# Patient Record
Sex: MALE | Race: WHITE | NOT HISPANIC OR LATINO | Employment: FULL TIME | URBAN - METROPOLITAN AREA
[De-identification: names, ages, dates, MRNs, and addresses within clinical notes are randomized per-mention and may not be internally consistent; named-entity substitution may affect disease eponyms.]

---

## 2018-12-26 ENCOUNTER — LAB REQUISITION (OUTPATIENT)
Dept: LAB | Facility: HOSPITAL | Age: 23
End: 2018-12-26

## 2018-12-26 DIAGNOSIS — Z00.00 ROUTINE GENERAL MEDICAL EXAMINATION AT A HEALTH CARE FACILITY: ICD-10-CM

## 2018-12-26 LAB — TROPONIN I SERPL-MCNC: <0.006 NG/ML

## 2018-12-26 PROCEDURE — 84484 ASSAY OF TROPONIN QUANT: CPT

## 2019-03-11 ENCOUNTER — OFFICE VISIT (OUTPATIENT)
Dept: INTERNAL MEDICINE | Facility: CLINIC | Age: 24
End: 2019-03-11

## 2019-03-11 ENCOUNTER — TELEPHONE (OUTPATIENT)
Dept: INTERNAL MEDICINE | Facility: CLINIC | Age: 24
End: 2019-03-11

## 2019-03-11 VITALS
HEIGHT: 74 IN | BODY MASS INDEX: 35.94 KG/M2 | WEIGHT: 280 LBS | RESPIRATION RATE: 17 BRPM | HEART RATE: 82 BPM | TEMPERATURE: 97.9 F | OXYGEN SATURATION: 96 % | SYSTOLIC BLOOD PRESSURE: 120 MMHG | DIASTOLIC BLOOD PRESSURE: 82 MMHG

## 2019-03-11 DIAGNOSIS — Z76.89 ENCOUNTER TO ESTABLISH CARE: Primary | ICD-10-CM

## 2019-03-11 DIAGNOSIS — F33.0 MILD EPISODE OF RECURRENT MAJOR DEPRESSIVE DISORDER (HCC): ICD-10-CM

## 2019-03-11 DIAGNOSIS — Z87.898 HISTORY OF CHEST PAIN: ICD-10-CM

## 2019-03-11 PROCEDURE — 99203 OFFICE O/P NEW LOW 30 MIN: CPT | Performed by: PHYSICIAN ASSISTANT

## 2019-03-11 RX ORDER — ESCITALOPRAM OXALATE 5 MG/1
5 TABLET ORAL DAILY
Qty: 30 TABLET | Refills: 2 | Status: SHIPPED | OUTPATIENT
Start: 2019-03-11 | End: 2019-04-16 | Stop reason: SDUPTHER

## 2019-03-11 NOTE — PROGRESS NOTES
"Chief Complaint   Patient presents with   • Establish Care     Depression, x3 months   • Chest Pain     x3 months ago F/U, neck pain, radiating neck pain from left side, down left arm, [facility] EKG, Labs,X-ray advised to take aleve for the pain       Subjective       History of Present Illness     Estuardo Crooks is a 23 y.o. male. He presents as a new patient to establish [facility]. Pt current concerns are depression/ anxiety, and f/u of chest pain from [facility] December 2018. Regarding depression, pt states he has had mild depression off and on x4 years, worsened in the last 3 months. He often feels down, doesn't want to leave the house in the AM, and feeling very fatigued. He is still going to work and not affecting work. He does report this is affecting some relationships, specifically with his fiancee. He does have some anxiety, spiraling thoughts. He denies panic attacks. Does have some difficulty sleeping, both falling asleep and staying asleep. He denies thoughts of self-harm or feeling hopeless, and denies thoughts of SI. He has never been on medication for depression/ anxiety in the past. He has never seen a counselor or therapist for this issue, only went to marital counseling about 1 year ago.     Pt also reports episode of chest pain in December 2018. Pt states began [place] with aching at L neck, radiated down to shoulder and arm and finally radiated to chest. He had some SOA with this episode, worse with lying down. He was seen at [facility] ED on 12/26/2018 with persistent symptoms. He had CXR, EKG, labs, and pt states he had \"inflammation around the heart\" likely due to virus. He was advised to take Aleve, no other tx. He states symptoms lasted about 2 weeks total, and he has not had any further chest pain, aching, or radiating pain. No further f/u on this issue since ED visit.       Are you currently seeing any other doctors or specialists? No     Are you currently taking any OTC medications or " herbal medications? Sadler dietary supplement    Sleep: 6-7 hours/ night, has trouble falling asleep and staying asleep  Diet: moderately healthy, per pt.   Exercise: no regular exercise routine, active with work    Most recent colonoscopy: n/a  Regular dental visits: Yes  Regular eye exams: Yes, wears glasses only     PHQ-9 Depression Screening  Little interest or pleasure in doing things? 1   Feeling down, depressed, or hopeless? 1   Trouble falling or staying asleep, or sleeping too much? 2   Feeling tired or having little energy? 3   Poor appetite or overeating? 3   Feeling bad about yourself - or that you are a failure or have let yourself or your family down? 1   Trouble concentrating on things, such as reading the newspaper or watching television? 3   Moving or speaking so slowly that other people could have noticed? Or the opposite - being so fidgety or restless that you have been moving around a lot more than usual? 1   Thoughts that you would be better off dead, or of hurting yourself in some way? 0   PHQ-9 Total Score 15   If you checked off any problems, how difficult have these problems made it for you to do your work, take care of things at home, or get along with other people? Somewhat difficult         The following portions of the patient's history were reviewed and updated as appropriate: allergies, current medications, past family history, past medical history, past social history, past surgical history and problem list.    No Known Allergies  Social History     Tobacco Use   • Smoking status: Former Smoker   • Smokeless tobacco: Current User   Substance Use Topics   • Alcohol use: Yes     Comment: occ.     History reviewed. No pertinent surgical history.  Family History   Problem Relation Age of Onset   • Arthritis Father    • Colon polyps Father    • Heart attack Maternal Grandmother    • Diabetes Maternal Grandfather          Current Outpatient Medications:   •  escitalopram (LEXAPRO) 5 MG  tablet, Take 1 tablet by mouth Daily., Disp: 30 tablet, Rfl: 2    There is no problem list on file for this patient.      Review of Systems   Constitutional: Positive for fatigue. Negative for chills and fever.   HENT: Negative for congestion, ear pain, sore throat and trouble swallowing.    Eyes: Negative for blurred vision.   Respiratory: Negative for cough, shortness of breath and wheezing.    Cardiovascular: Negative for chest pain, palpitations and leg swelling.   Gastrointestinal: Negative for abdominal pain, diarrhea, nausea and vomiting.   Genitourinary: Negative for dysuria.   Allergic/Immunologic: Negative for immunocompromised state.   Neurological: Negative for dizziness, syncope, weakness and headache.   Psychiatric/Behavioral: Positive for sleep disturbance and depressed mood. Negative for self-injury and suicidal ideas. The patient is nervous/anxious.        Objective   Vitals:    03/11/19 0913   BP: 120/82   Pulse: 82   Resp: 17   Temp: 97.9 °F (36.6 °C)   SpO2: 96%     Physical Exam   Constitutional: He appears well-developed and well-nourished.   HENT:   Head: Normocephalic and atraumatic.   Right Ear: Tympanic membrane, external ear and ear canal normal.   Left Ear: Tympanic membrane, external ear and ear canal normal.   Mouth/Throat: Oropharynx is clear and moist and mucous membranes are normal.   Eyes: Conjunctivae are normal.   Neck: Normal range of motion. Neck supple. Carotid bruit is not present. No thyromegaly present.   Cardiovascular: Normal rate and regular rhythm.   No murmur heard.  Pulmonary/Chest: Effort normal and breath sounds normal. He has no wheezes. He has no rales.   Abdominal: Soft. There is no hepatosplenomegaly. There is no tenderness.   Lymphadenopathy:     He has no cervical adenopathy.   Psychiatric: He has a normal mood and affect. His behavior is normal.             Assessment/Plan   Estuardo was seen today for establish care and chest pain.    Diagnoses and all orders  for this visit:    Encounter to establish care    Mild episode of recurrent major depressive disorder (CMS/HCC)  -     escitalopram (LEXAPRO) 5 MG tablet; Take 1 tablet by mouth Daily.    History of chest pain      Will request [facility] labs, records for review. Will contact pt if any further workup necessary after review of records.  Begin Lexapro at this time.   Labs at next visit.            Return in about 6 weeks (around 4/22/2019) for Annual physical- fasting .

## 2019-03-11 NOTE — TELEPHONE ENCOUNTER
[facility] Medical Records Phone: [phone], Spoke to Lias, requested ER report. Office number and fax number given. [phone], phone. [phone] fax.

## 2019-03-11 NOTE — TELEPHONE ENCOUNTER
----- Message from Lilia Dueñas PA-C sent at 3/11/2019  9:42 AM EDT -----  Please request records, labs, CXR from [facility] 12/26/2018.

## 2019-03-12 NOTE — TELEPHONE ENCOUNTER
Received a call yesterday from [facility] medical records stating they had no recent records on patient, he hadn't been since 2017. I called patient and he states he was not seen at [facility], he was seen at [facility]. I sent a request for records to them.

## 2019-04-16 ENCOUNTER — TELEPHONE (OUTPATIENT)
Dept: INTERNAL MEDICINE | Facility: CLINIC | Age: 24
End: 2019-04-16

## 2019-04-16 DIAGNOSIS — F33.0 MILD EPISODE OF RECURRENT MAJOR DEPRESSIVE DISORDER (HCC): ICD-10-CM

## 2019-04-16 RX ORDER — ESCITALOPRAM OXALATE 5 MG/1
5 TABLET ORAL DAILY
Qty: 30 TABLET | Refills: 2 | Status: SHIPPED | OUTPATIENT
Start: 2019-04-16 | End: 2019-04-25

## 2019-04-16 NOTE — TELEPHONE ENCOUNTER
----- Message from Anila Tabor sent at 4/16/2019 12:35 PM EDT -----  Contact: SELF  JAMEEL CLAIRE CALLING FOR A REFILL FOR ESCITALOPRAM. HE WILL NOW BE USING THE [employer] [facility] ON [address] FOR ALL MEDICATION. HE CAN BE REACHED -[phone], HE WOULD LIKE A CALL WHEN THIS IS DONE.

## 2019-04-16 NOTE — TELEPHONE ENCOUNTER
Estuardo Crooks [phone]  Spoke to pt, advised of clinical message. Good verbal understanding.

## 2019-04-25 ENCOUNTER — OFFICE VISIT (OUTPATIENT)
Dept: INTERNAL MEDICINE | Facility: CLINIC | Age: 24
End: 2019-04-25

## 2019-04-25 VITALS
HEIGHT: 74 IN | OXYGEN SATURATION: 98 % | DIASTOLIC BLOOD PRESSURE: 80 MMHG | RESPIRATION RATE: 16 BRPM | SYSTOLIC BLOOD PRESSURE: 112 MMHG | TEMPERATURE: 97.4 F | HEART RATE: 72 BPM | BODY MASS INDEX: 33.62 KG/M2 | WEIGHT: 262 LBS

## 2019-04-25 DIAGNOSIS — F33.41 RECURRENT MAJOR DEPRESSIVE DISORDER, IN PARTIAL REMISSION (HCC): ICD-10-CM

## 2019-04-25 DIAGNOSIS — Z13.220 SCREENING FOR LIPID DISORDERS: ICD-10-CM

## 2019-04-25 DIAGNOSIS — Z00.00 HEALTH MAINTENANCE EXAMINATION: Primary | ICD-10-CM

## 2019-04-25 DIAGNOSIS — Z23 NEED FOR TDAP VACCINATION: ICD-10-CM

## 2019-04-25 LAB
ALBUMIN SERPL-MCNC: 4.4 G/DL (ref 3.5–5.2)
ALBUMIN/GLOB SERPL: 1.3 G/DL
ALP SERPL-CCNC: 70 U/L (ref 39–117)
ALT SERPL W P-5'-P-CCNC: 27 U/L (ref 1–41)
ANION GAP SERPL CALCULATED.3IONS-SCNC: 10.4 MMOL/L
AST SERPL-CCNC: 21 U/L (ref 1–40)
BASOPHILS # BLD AUTO: 0.04 10*3/MM3 (ref 0–0.2)
BASOPHILS NFR BLD AUTO: 0.7 % (ref 0–1.5)
BILIRUB BLD-MCNC: NEGATIVE MG/DL
BILIRUB SERPL-MCNC: 0.6 MG/DL (ref 0.2–1.2)
BUN BLD-MCNC: 10 MG/DL (ref 6–20)
BUN/CREAT SERPL: 11.5 (ref 7–25)
CALCIUM SPEC-SCNC: 10.2 MG/DL (ref 8.6–10.5)
CHLORIDE SERPL-SCNC: 100 MMOL/L (ref 98–107)
CHOLEST SERPL-MCNC: 173 MG/DL (ref 0–200)
CLARITY, POC: CLEAR
CO2 SERPL-SCNC: 25.6 MMOL/L (ref 22–29)
COLOR UR: YELLOW
CREAT BLD-MCNC: 0.87 MG/DL (ref 0.76–1.27)
DEPRECATED RDW RBC AUTO: 43.8 FL (ref 37–54)
EOSINOPHIL # BLD AUTO: 0.11 10*3/MM3 (ref 0–0.4)
EOSINOPHIL NFR BLD AUTO: 2 % (ref 0.3–6.2)
ERYTHROCYTE [DISTWIDTH] IN BLOOD BY AUTOMATED COUNT: 12.8 % (ref 12.3–15.4)
EXPIRATION DATE: NORMAL
GFR SERPL CREATININE-BSD FRML MDRD: 106 ML/MIN/1.73
GLOBULIN UR ELPH-MCNC: 3.3 GM/DL
GLUCOSE BLD-MCNC: 81 MG/DL (ref 65–99)
GLUCOSE UR STRIP-MCNC: NEGATIVE MG/DL
HCT VFR BLD AUTO: 49.5 % (ref 37.5–51)
HDLC SERPL-MCNC: 46 MG/DL (ref 40–60)
HGB BLD-MCNC: 15.8 G/DL (ref 13–17.7)
IMM GRANULOCYTES # BLD AUTO: 0.02 10*3/MM3 (ref 0–0.05)
IMM GRANULOCYTES NFR BLD AUTO: 0.4 % (ref 0–0.5)
KETONES UR QL: NEGATIVE
LDLC SERPL CALC-MCNC: 113 MG/DL (ref 0–100)
LDLC/HDLC SERPL: 2.45 {RATIO}
LEUKOCYTE EST, POC: NEGATIVE
LYMPHOCYTES # BLD AUTO: 1.69 10*3/MM3 (ref 0.7–3.1)
LYMPHOCYTES NFR BLD AUTO: 30.2 % (ref 19.6–45.3)
Lab: NORMAL
MCH RBC QN AUTO: 29.8 PG (ref 26.6–33)
MCHC RBC AUTO-ENTMCNC: 31.9 G/DL (ref 31.5–35.7)
MCV RBC AUTO: 93.2 FL (ref 79–97)
MONOCYTES # BLD AUTO: 0.59 10*3/MM3 (ref 0.1–0.9)
MONOCYTES NFR BLD AUTO: 10.5 % (ref 5–12)
NEUTROPHILS # BLD AUTO: 3.15 10*3/MM3 (ref 1.7–7)
NEUTROPHILS NFR BLD AUTO: 56.2 % (ref 42.7–76)
NITRITE UR-MCNC: NEGATIVE MG/ML
NRBC BLD AUTO-RTO: 0 /100 WBC (ref 0–0.2)
PH UR: 6 [PH] (ref 5–8)
PLATELET # BLD AUTO: 233 10*3/MM3 (ref 140–450)
PMV BLD AUTO: 9.9 FL (ref 6–12)
POTASSIUM BLD-SCNC: 4.4 MMOL/L (ref 3.5–5.2)
PROT SERPL-MCNC: 7.7 G/DL (ref 6–8.5)
PROT UR STRIP-MCNC: NEGATIVE MG/DL
RBC # BLD AUTO: 5.31 10*6/MM3 (ref 4.14–5.8)
RBC # UR STRIP: NEGATIVE /UL
SODIUM BLD-SCNC: 136 MMOL/L (ref 136–145)
SP GR UR: 1.01 (ref 1–1.03)
TRIGL SERPL-MCNC: 71 MG/DL (ref 0–150)
UROBILINOGEN UR QL: NORMAL
VLDLC SERPL-MCNC: 14.2 MG/DL (ref 5–40)
WBC NRBC COR # BLD: 5.6 10*3/MM3 (ref 3.4–10.8)

## 2019-04-25 PROCEDURE — 80061 LIPID PANEL: CPT | Performed by: PHYSICIAN ASSISTANT

## 2019-04-25 PROCEDURE — 90471 IMMUNIZATION ADMIN: CPT | Performed by: PHYSICIAN ASSISTANT

## 2019-04-25 PROCEDURE — 81003 URINALYSIS AUTO W/O SCOPE: CPT | Performed by: PHYSICIAN ASSISTANT

## 2019-04-25 PROCEDURE — 99395 PREV VISIT EST AGE 18-39: CPT | Performed by: PHYSICIAN ASSISTANT

## 2019-04-25 PROCEDURE — 36415 COLL VENOUS BLD VENIPUNCTURE: CPT | Performed by: PHYSICIAN ASSISTANT

## 2019-04-25 PROCEDURE — 85025 COMPLETE CBC W/AUTO DIFF WBC: CPT | Performed by: PHYSICIAN ASSISTANT

## 2019-04-25 PROCEDURE — 90715 TDAP VACCINE 7 YRS/> IM: CPT | Performed by: PHYSICIAN ASSISTANT

## 2019-04-25 PROCEDURE — 80053 COMPREHEN METABOLIC PANEL: CPT | Performed by: PHYSICIAN ASSISTANT

## 2019-04-25 RX ORDER — ESCITALOPRAM OXALATE 10 MG/1
10 TABLET ORAL DAILY
Qty: 30 TABLET | Refills: 5 | Status: SHIPPED | OUTPATIENT
Start: 2019-04-25 | End: 2019-10-30 | Stop reason: SDUPTHER

## 2019-04-25 NOTE — PROGRESS NOTES
Chief Complaint   Patient presents with   • Annual Exam     fasting       Subjective       History of Present Illness     Estuardo Crooks is a 24 y.o. male. He presents for his annual physical. Overall pt states he is doing well with no new concerns. He is taking Lexapro as directed and feels he has had some mild benefits in regards to his moods, and having less low moods overall. Still has some days where he feels low, especially low energy. No anxiety attacks. No thoughts of self-harm or SI. He lives at home with his fiancee and 3 year old daughter.       Are you currently seeing any other doctors or specialists? No      Are you currently taking any OTC medications or herbal medications? Sadler dietary supplement     Sleep: 6-7 hours/ night  Diet: moderately healthy, per pt.   Exercise: no regular exercise routine, active with work     Most recent colonoscopy: n/a  Regular dental visits: Yes  Regular eye exams: Yes, wears glasses only       PHQ-9 Depression Screening  Little interest or pleasure in doing things? 2   Feeling down, depressed, or hopeless? 1   Trouble falling or staying asleep, or sleeping too much? 2   Feeling tired or having little energy? 2   Poor appetite or overeating? 1   Feeling bad about yourself - or that you are a failure or have let yourself or your family down? 0   Trouble concentrating on things, such as reading the newspaper or watching television? 2   Moving or speaking so slowly that other people could have noticed? Or the opposite - being so fidgety or restless that you have been moving around a lot more than usual? 2   Thoughts that you would be better off dead, or of hurting yourself in some way? 0   PHQ-9 Total Score 12   If you checked off any problems, how difficult have these problems made it for you to do your work, take care of things at home, or get along with other people? Somewhat difficult         The following portions of the patient's history were reviewed and  updated as appropriate: allergies, current medications, past family history, past medical history, past social history, past surgical history and problem list.    No Known Allergies  Social History     Tobacco Use   • Smoking status: Former Smoker   • Smokeless tobacco: Current User   Substance Use Topics   • Alcohol use: Yes     Comment: occ.     History reviewed. No pertinent surgical history.  Family History   Problem Relation Age of Onset   • Arthritis Father    • Colon polyps Father    • Heart attack Maternal Grandmother    • COPD Maternal Grandmother    • Diabetes Maternal Grandfather          Current Outpatient Medications:   •  escitalopram (LEXAPRO) 10 MG tablet, Take 1 tablet by mouth Daily., Disp: 30 tablet, Rfl: 5    There is no problem list on file for this patient.      Review of Systems   Constitutional: Negative for chills, fatigue and fever.   HENT: Negative for congestion, sore throat and trouble swallowing.    Eyes: Negative for blurred vision, pain and redness.   Respiratory: Negative for cough, chest tightness, shortness of breath and wheezing.    Cardiovascular: Negative for chest pain, palpitations and leg swelling.   Gastrointestinal: Negative for abdominal pain, blood in stool, diarrhea, nausea and vomiting.   Endocrine: Negative for cold intolerance and heat intolerance.   Genitourinary: Negative for difficulty urinating, dysuria, hematuria and testicular pain.   Musculoskeletal: Negative for back pain, gait problem and myalgias.   Neurological: Negative for dizziness, weakness and headache.   Psychiatric/Behavioral: Positive for depressed mood (improved). Negative for self-injury. The patient is not nervous/anxious.        Objective   Vitals:    04/25/19 0819   BP: 112/80   Pulse: 72   Resp: 16   Temp: 97.4 °F (36.3 °C)   SpO2: 98%     Physical Exam   Constitutional: He is oriented to person, place, and time. He appears well-developed and well-nourished.   HENT:   Head: Normocephalic and  atraumatic.   Right Ear: Tympanic membrane, external ear and ear canal normal. No tenderness.   Left Ear: Tympanic membrane, external ear and ear canal normal. No tenderness.   Nose: Nose normal. No congestion.   Mouth/Throat: Oropharynx is clear and moist and mucous membranes are normal. No oral lesions.   Eyes: Conjunctivae and EOM are normal. Pupils are equal, round, and reactive to light. No scleral icterus.   Neck: Normal range of motion. Neck supple. Carotid bruit is not present. No thyromegaly present.   Cardiovascular: Normal rate, regular rhythm, normal heart sounds and normal pulses. Exam reveals no gallop.   No murmur heard.  Pulses:       Radial pulses are 2+ on the right side, and 2+ on the left side.        Dorsalis pedis pulses are 2+ on the right side, and 2+ on the left side.   Pulmonary/Chest: Effort normal and breath sounds normal. He has no wheezes. He has no rales. He exhibits no deformity.   Abdominal: Soft. Bowel sounds are normal. He exhibits no mass. There is no hepatosplenomegaly. There is no tenderness. There is negative Saldana's sign.   Genitourinary: Testes normal and penis normal. No discharge found.   Musculoskeletal: Normal range of motion. He exhibits no tenderness or deformity.   Lymphadenopathy:     He has no cervical adenopathy.   Neurological: He is alert and oriented to person, place, and time. He has normal strength.   Skin: Skin is warm and dry. No rash noted. No erythema.   No atypical nevi.    Psychiatric: He has a normal mood and affect. His behavior is normal.   Vitals reviewed.            Assessment/Plan   Estuardo was seen today for annual exam.    Diagnoses and all orders for this visit:    Health maintenance examination  -     CBC & Differential  -     Comprehensive Metabolic Panel  -     POC Urinalysis Dipstick, Automated  -     CBC Auto Differential    Recurrent major depressive disorder, in partial remission (CMS/HCC)  -     escitalopram (LEXAPRO) 10 MG tablet; Take  1 tablet by mouth Daily.    Screening for lipid disorders  -     Lipid Panel    Need for Tdap vaccination  -     Tdap Vaccine Greater Than or Equal To 8yo IM      Will increase lexapro dose today, as pt tolerating well with some improvement but could benefit from increased dose.   Further plans after review of labs.        Patient education discussed during this visit:  - healthy sleep habits and appropriate amount of sleep  - H2O consumption, well-balanced diet  - exercise routine which includes at least 150 minutes of cardio per week + muscle strengthening exercises  - immunizations including annual flu vaccination      Return in about 6 months (around 10/25/2019) for Follow up- depression.

## 2019-10-30 ENCOUNTER — OFFICE VISIT (OUTPATIENT)
Dept: INTERNAL MEDICINE | Facility: CLINIC | Age: 24
End: 2019-10-30

## 2019-10-30 VITALS
OXYGEN SATURATION: 96 % | TEMPERATURE: 97.4 F | HEART RATE: 69 BPM | HEIGHT: 74 IN | SYSTOLIC BLOOD PRESSURE: 110 MMHG | DIASTOLIC BLOOD PRESSURE: 80 MMHG | WEIGHT: 280 LBS | RESPIRATION RATE: 16 BRPM | BODY MASS INDEX: 35.94 KG/M2

## 2019-10-30 DIAGNOSIS — F33.41 RECURRENT MAJOR DEPRESSIVE DISORDER, IN PARTIAL REMISSION (HCC): Primary | ICD-10-CM

## 2019-10-30 DIAGNOSIS — G47.00 INSOMNIA, UNSPECIFIED TYPE: ICD-10-CM

## 2019-10-30 PROCEDURE — 99214 OFFICE O/P EST MOD 30 MIN: CPT | Performed by: PHYSICIAN ASSISTANT

## 2019-10-30 RX ORDER — TRAZODONE HYDROCHLORIDE 50 MG/1
50 TABLET ORAL NIGHTLY
Qty: 30 TABLET | Refills: 2 | Status: SHIPPED | OUTPATIENT
Start: 2019-10-30 | End: 2021-03-31 | Stop reason: SDUPTHER

## 2019-10-30 RX ORDER — ESCITALOPRAM OXALATE 10 MG/1
10 TABLET ORAL DAILY
Qty: 30 TABLET | Refills: 5 | Status: SHIPPED | OUTPATIENT
Start: 2019-10-30 | End: 2021-03-31 | Stop reason: SDUPTHER

## 2019-10-30 NOTE — PROGRESS NOTES
Chief Complaint   Patient presents with   • Depression      6month F/U, refills       Subjective       History of Present Illness     Estuardo Crooks is a 24 y.o. male. He presents for routine follow up of depression, and evaluation of insomnia. Regarding depression, pt states his symptoms are stable on lexapro and he has no new concerns. He denies low moods, feelings of hopelessness, disinterest in daily activities, thought of self-harm or SI. No s/e from medication. Denies anxiety.     Pt does report insomnia, which is a new issue to our office. He reports insomnia x years. He has more trouble falling asleep, occasionally difficulty with staying asleep. He has fatigue throughout the day due to poor sleep quality. He is sleeping about 6 hours/ night. He has tried diphenhydramine 50mg sleep aid with mild improvement of insomnia. He has also tried 10mg melatonin without improvement. Diphenhydramine does help him fall asleep, but often has medication hangover and feeling drowsy in the AM. He has never had Rx medication for insomnia.     The following portions of the patient's history were reviewed and updated as appropriate: allergies, current medications, past medical history, past social history and problem list.    No Known Allergies  Social History     Tobacco Use   • Smoking status: Former Smoker   • Smokeless tobacco: Current User   Substance Use Topics   • Alcohol use: Yes     Comment: occ.         Current Outpatient Medications:   •  escitalopram (LEXAPRO) 10 MG tablet, Take 1 tablet by mouth Daily., Disp: 30 tablet, Rfl: 5  •  traZODone (DESYREL) 50 MG tablet, Take 1 tablet by mouth Every Night., Disp: 30 tablet, Rfl: 2    Review of Systems   Constitutional: Positive for fatigue. Negative for appetite change, chills and fever.   HENT: Negative for ear pain and sore throat.    Eyes: Negative for visual disturbance.   Respiratory: Negative for cough, shortness of breath and wheezing.    Cardiovascular:  Negative for chest pain.   Gastrointestinal: Negative for abdominal pain, nausea, vomiting and GERD.   Genitourinary: Negative for dysuria and hematuria.   Neurological: Negative for dizziness, syncope, weakness and headache.   Psychiatric/Behavioral: Positive for sleep disturbance and depressed mood (well-controlled). Negative for self-injury. The patient is not nervous/anxious.        Objective   Vitals:    10/30/19 0802   BP: 110/80   Pulse: 69   Resp: 16   Temp: 97.4 °F (36.3 °C)   SpO2: 96%     Physical Exam   Constitutional: He appears well-developed and well-nourished.   HENT:   Head: Normocephalic and atraumatic.   Right Ear: Tympanic membrane, external ear and ear canal normal.   Left Ear: Tympanic membrane, external ear and ear canal normal.   Mouth/Throat: Oropharynx is clear and moist and mucous membranes are normal.   Eyes: Conjunctivae are normal.   Cardiovascular: Normal rate, regular rhythm and intact distal pulses.   No murmur heard.  Pulmonary/Chest: Effort normal and breath sounds normal. He has no wheezes. He has no rales.   Lymphadenopathy:     He has no cervical adenopathy.   Psychiatric: He has a normal mood and affect. His behavior is normal. Judgment and thought content normal.         Assessment/Plan   Estuardo was seen today for depression.    Diagnoses and all orders for this visit:    Recurrent major depressive disorder, in partial remission (CMS/[facility])  -     escitalopram (LEXAPRO) 10 MG tablet; Take 1 tablet by mouth Daily.    Insomnia, unspecified type  -     traZODone (DESYREL) 50 MG tablet; Take 1 tablet by mouth Every Night.      Advised to take trazodone 1 hour before bed and adjust as necessary. After 1 week if insufficient improvement, advised to add 5mg melatonin to trazodone.          Return in about 6 months (around 4/30/2020) for Annual physical.

## 2020-03-04 ENCOUNTER — OFFICE VISIT (OUTPATIENT)
Dept: INTERNAL MEDICINE | Facility: CLINIC | Age: 25
End: 2020-03-04

## 2020-03-04 VITALS
HEIGHT: 74 IN | TEMPERATURE: 97.7 F | SYSTOLIC BLOOD PRESSURE: 120 MMHG | OXYGEN SATURATION: 98 % | HEART RATE: 116 BPM | WEIGHT: 280 LBS | RESPIRATION RATE: 20 BRPM | DIASTOLIC BLOOD PRESSURE: 82 MMHG | BODY MASS INDEX: 35.94 KG/M2

## 2020-03-04 DIAGNOSIS — R52 GENERALIZED BODY ACHES: ICD-10-CM

## 2020-03-04 DIAGNOSIS — B35.6 TINEA CRURIS: ICD-10-CM

## 2020-03-04 DIAGNOSIS — B34.9 ACUTE VIRAL SYNDROME: Primary | ICD-10-CM

## 2020-03-04 LAB
EXPIRATION DATE: NORMAL
FLUAV AG NPH QL: NEGATIVE
FLUBV AG NPH QL: NEGATIVE
INTERNAL CONTROL: NORMAL
Lab: NORMAL

## 2020-03-04 PROCEDURE — 99214 OFFICE O/P EST MOD 30 MIN: CPT | Performed by: PHYSICIAN ASSISTANT

## 2020-03-04 PROCEDURE — 87804 INFLUENZA ASSAY W/OPTIC: CPT | Performed by: PHYSICIAN ASSISTANT

## 2020-03-04 RX ORDER — FLUCONAZOLE 150 MG/1
TABLET ORAL
Qty: 3 TABLET | Refills: 0 | Status: SHIPPED | OUTPATIENT
Start: 2020-03-04 | End: 2021-04-29

## 2020-03-04 RX ORDER — CLOTRIMAZOLE AND BETAMETHASONE DIPROPIONATE 10; .64 MG/G; MG/G
CREAM TOPICAL 2 TIMES DAILY
Qty: 45 G | Refills: 0 | Status: SHIPPED | OUTPATIENT
Start: 2020-03-04 | End: 2021-04-29

## 2020-03-04 NOTE — PROGRESS NOTES
Chief Complaint   Patient presents with   • Cough     x3pm yesterday, sore throat, light headed, cough, productive, chills,  NyQuil last night feels better       Subjective       History of Present Illness     Estuardo Crooks is a 24 y.o. male. He presents with 1 day history of flu-like symptoms, and 5 week history of groin rash. Pt states he has had a productive cough with dark green mucous x1 day. He also reports congestion, sore throat, HA, PND, body aches, lightheadedness, chills, and a subjective fever. He denies SOA, wheezing, abdominal pain, N/V/D. His spouse had similar sx last week and was dx with a virus. He has tried OTC sinus congestion and Nyquil which did give some temporary relief of symptoms. He has not had any medication today. He did not receive a flu vaccine this season.     Pt also reports a groin rash x5 weeks. This is irritating but not itchy or painful. He has tried gentle formula soap and OTC jock itch cream which have provided some relief.     The following portions of the patient's history were reviewed and updated as appropriate: allergies, current medications, past medical history and problem list.    No Known Allergies  Social History     Tobacco Use   • Smoking status: Former Smoker   • Smokeless tobacco: Current User   Substance Use Topics   • Alcohol use: Yes     Comment: occ.         Current Outpatient Medications:   •  escitalopram (LEXAPRO) 10 MG tablet, Take 1 tablet by mouth Daily., Disp: 30 tablet, Rfl: 5  •  traZODone (DESYREL) 50 MG tablet, Take 1 tablet by mouth Every Night., Disp: 30 tablet, Rfl: 2  •  clotrimazole-betamethasone (LOTRISONE) 1-0.05 % cream, Apply  topically to the appropriate area as directed 2 (Two) Times a Day., Disp: 45 g, Rfl: 0  •  fluconazole (DIFLUCAN) 150 MG tablet, Take 1 tablet by mouth every 3 days for a total of 3 doses., Disp: 3 tablet, Rfl: 0    Review of Systems   Constitutional: Positive for chills, fatigue and fever. Negative for appetite  change.        +body aches   HENT: Positive for congestion, postnasal drip and sore throat. Negative for ear pain, sinus pressure, sneezing and trouble swallowing.    Respiratory: Positive for cough. Negative for shortness of breath and wheezing.    Cardiovascular: Negative for chest pain.   Gastrointestinal: Negative for abdominal pain, diarrhea, nausea and vomiting.   Genitourinary: Negative for dysuria and hematuria.   Skin: Positive for rash.   Allergic/Immunologic: Negative for immunocompromised state.   Neurological: Positive for light-headedness and headache. Negative for dizziness and weakness.       Objective   Vitals:    03/04/20 1147   BP: 120/82   Pulse: 116   Resp: 20   Temp: 97.7 °F (36.5 °C)   SpO2: 98%     Physical Exam   Constitutional: He appears well-developed and well-nourished.   HENT:   Head: Normocephalic and atraumatic.   Right Ear: Tympanic membrane, external ear and ear canal normal.   Left Ear: Tympanic membrane, external ear and ear canal normal.   Mouth/Throat: Oropharynx is clear and moist and mucous membranes are normal.   Eyes: Conjunctivae are normal.   Neck: Neck supple.   Cardiovascular: Normal rate and regular rhythm.   No murmur heard.  Pulmonary/Chest: Effort normal and breath sounds normal. He has no wheezes. He has no rales.   Abdominal: Soft. Bowel sounds are normal. He exhibits no distension and no mass. There is no hepatosplenomegaly. There is no tenderness.   Lymphadenopathy:     He has no cervical adenopathy.   Skin: Rash noted.   +bilateral bright pink groin rash, consistent with tinea cruris.    Psychiatric: He has a normal mood and affect. His behavior is normal.         Results for orders placed or performed in visit on 03/04/20   POC Influenza A / B   Result Value Ref Range    Rapid Influenza A Ag Negative Negative    Rapid Influenza B Ag Negative Negative    Internal Control Passed Passed    Lot Number [id number]     Expiration Date 6-30-21          Assessment/Plan    Estuardo was seen today for cough.    Diagnoses and all orders for this visit:    Acute viral syndrome    Generalized body aches  -     POC Influenza A / B    Tinea cruris  -     clotrimazole-betamethasone (LOTRISONE) 1-0.05 % cream; Apply  topically to the appropriate area as directed 2 (Two) Times a Day.  -     fluconazole (DIFLUCAN) 150 MG tablet; Take 1 tablet by mouth every 3 days for a total of 3 doses.      Negative flu A/B. Likely viral in nature. Spouse had similar sx last week with 5-7 duration and resolved without meds. Advised to continue supportive care with OTC cold and sinus and Nyquil PRN.   Regarding rash, keep area clean and dry outside of cream use above.              Return if symptoms worsen or fail to improve.

## 2021-03-31 ENCOUNTER — OFFICE VISIT (OUTPATIENT)
Dept: INTERNAL MEDICINE | Facility: CLINIC | Age: 26
End: 2021-03-31

## 2021-03-31 VITALS
OXYGEN SATURATION: 98 % | RESPIRATION RATE: 20 BRPM | HEART RATE: 94 BPM | BODY MASS INDEX: 35.46 KG/M2 | DIASTOLIC BLOOD PRESSURE: 88 MMHG | WEIGHT: 276.2 LBS | TEMPERATURE: 97.5 F | SYSTOLIC BLOOD PRESSURE: 138 MMHG

## 2021-03-31 DIAGNOSIS — F43.23 SITUATIONAL MIXED ANXIETY AND DEPRESSIVE DISORDER: Primary | ICD-10-CM

## 2021-03-31 DIAGNOSIS — G47.00 INSOMNIA, UNSPECIFIED TYPE: ICD-10-CM

## 2021-03-31 PROCEDURE — 99213 OFFICE O/P EST LOW 20 MIN: CPT | Performed by: INTERNAL MEDICINE

## 2021-03-31 RX ORDER — TRAZODONE HYDROCHLORIDE 50 MG/1
TABLET ORAL
Qty: 60 TABLET | Refills: 2 | Status: SHIPPED | OUTPATIENT
Start: 2021-03-31 | End: 2021-06-22

## 2021-03-31 RX ORDER — ESCITALOPRAM OXALATE 10 MG/1
10 TABLET ORAL DAILY
Qty: 30 TABLET | Refills: 5 | Status: SHIPPED | OUTPATIENT
Start: 2021-03-31 | End: 2021-09-08

## 2021-03-31 NOTE — PROGRESS NOTES
"Subjective       Estuardo Crooks is a 25 y.o. male.     Chief Complaint   Patient presents with   • Anxiety   • Depression       History obtained from the patient.      History of Present Illness     The patient is here for worsening symptoms of Anxiety and Depression.  In the past, he had been on Lexapro 10 mg, with relief of his symptoms.  He went off Lexapro, and also Trazodone, 1.5 years ago when he no longer felt he needed it.  He did not have any side effects with either medication.  He states he is going through a divorce, and things are \"getting ugly\".  He had a panic attack 2 weeks ago.  He is also having insomnia.  He states the Trazodone 50 mg did not really help with his insomnia, nor did Melatonin.  He has been to marriage counseling, but no individual counseling.  He is having some concentration issues but no memory loss.  He denies feelings of hopelessness and worthlessness, but does have some feelings of guilt.  He denies anhedonia.  He denies any suicidal ideation or feelings of self-harm.  He does not exercise regularly, but is active at work.    The following portions of the patient's history were reviewed and updated as appropriate: allergies, current medications, past family history, past medical history, past social history, past surgical history and problem list.      Review of Systems   Constitutional: Negative for unexpected weight change.   Neurological:        Denies memory loss.   Psychiatric/Behavioral: Positive for decreased concentration and sleep disturbance. Negative for agitation, confusion, self-injury and suicidal ideas. The patient is nervous/anxious. The patient is not hyperactive.            Objective     Blood pressure 138/88, pulse 94, temperature 97.5 °F (36.4 °C), temperature source Temporal, resp. rate 20, weight 125 kg (276 lb 3.2 oz), SpO2 98 %.    Physical Exam      Assessment/Plan   Diagnoses and all orders for this visit:    1. Situational mixed anxiety and " depressive disorder (Primary)  -     escitalopram (Lexapro) 10 MG tablet; Take 1 tablet by mouth Daily.  Dispense: 30 tablet; Refill: 5    2. Insomnia, unspecified type  -     traZODone (DESYREL) 50 MG tablet; 1-2 po qhs prn sleep  Dispense: 60 tablet; Refill: 2            Return in about 3 weeks (around 4/21/2021) for Annual physical, fasting with kathie Choudhury appt with Cassandra 4/9/21.

## 2021-04-29 ENCOUNTER — OFFICE VISIT (OUTPATIENT)
Dept: INTERNAL MEDICINE | Facility: CLINIC | Age: 26
End: 2021-04-29

## 2021-04-29 VITALS
TEMPERATURE: 98 F | HEIGHT: 72 IN | DIASTOLIC BLOOD PRESSURE: 82 MMHG | SYSTOLIC BLOOD PRESSURE: 124 MMHG | OXYGEN SATURATION: 96 % | RESPIRATION RATE: 16 BRPM | WEIGHT: 265 LBS | BODY MASS INDEX: 35.89 KG/M2 | HEART RATE: 82 BPM

## 2021-04-29 DIAGNOSIS — R06.83 SNORING: ICD-10-CM

## 2021-04-29 DIAGNOSIS — J02.9 PHARYNGITIS, UNSPECIFIED ETIOLOGY: ICD-10-CM

## 2021-04-29 DIAGNOSIS — G47.30 SLEEP APNEA, UNSPECIFIED TYPE: Primary | ICD-10-CM

## 2021-04-29 LAB
EXPIRATION DATE: NORMAL
INTERNAL CONTROL: NORMAL
Lab: NORMAL
S PYO AG THROAT QL: NEGATIVE

## 2021-04-29 PROCEDURE — 87880 STREP A ASSAY W/OPTIC: CPT | Performed by: PHYSICIAN ASSISTANT

## 2021-04-29 PROCEDURE — 87081 CULTURE SCREEN ONLY: CPT | Performed by: PHYSICIAN ASSISTANT

## 2021-04-29 PROCEDURE — 99213 OFFICE O/P EST LOW 20 MIN: CPT | Performed by: PHYSICIAN ASSISTANT

## 2021-04-29 RX ORDER — AZITHROMYCIN 500 MG/1
500 TABLET, FILM COATED ORAL DAILY
Qty: 5 TABLET | Refills: 0 | Status: SHIPPED | OUTPATIENT
Start: 2021-04-29 | End: 2021-05-04

## 2021-04-29 NOTE — PROGRESS NOTES
"Chief Complaint   Patient presents with   • Sore Throat     3 weeks, post nasal drip,ear pressure right ear, went swimming on Tuesday painful with water in the ear   • Sleep Apnea     discuss sleep study       Subjective       History of Present Illness     Estuardo Crooks is a 26 y.o. male. He presents with 3 week history of sore throat, and discuss sleep apnea. Pt reports sore throat began 4/8/2021. He also had fever/ chills at that time, but fever and chills resolved after 3-4 days and he has not had any further fever in >2 weeks. However, he continues to have a sore throat. He also reports painful swallowing and R ear pain, PND. Went swimming two days ago and ear pain worsened. He has tried ibuprofen which does give partial relief. He had full dose of Augmentin x10 days at home from a previous Rx and began this 4/9/2021, finished all of this but only minimally improved sore throat while he was on this, and sore throat has since worsened again. He denies HA, cough, SOA, loss of taste or smell.     Pt also has concerns for sleep apnea. He states that he has been told by friends and family members that he has had apneic episodes. He also snores. These sx have been present for \"years.\" He also endorses feeling fatigued upon waking and daytime sleepiness. He has never had a sleep study in the past.     The following portions of the patient's history were reviewed and updated as appropriate: allergies, current medications, past medical history and problem list.    No Known Allergies  Social History     Tobacco Use   • Smoking status: Former Smoker   • Smokeless tobacco: Current User   Substance Use Topics   • Alcohol use: Yes     Comment: occ.         Current Outpatient Medications:   •  escitalopram (Lexapro) 10 MG tablet, Take 1 tablet by mouth Daily., Disp: 30 tablet, Rfl: 5  •  traZODone (DESYREL) 50 MG tablet, 1-2 po qhs prn sleep, Disp: 60 tablet, Rfl: 2  •  azithromycin (Zithromax) 500 MG tablet, Take 1 " tablet by mouth Daily for 5 days., Disp: 5 tablet, Rfl: 0    Review of Systems   Constitutional: Positive for fatigue. Negative for chills and fever.   HENT: Positive for ear pain, postnasal drip, sore throat and trouble swallowing (pain). Negative for congestion, sinus pressure and sneezing.    Eyes: Negative for pain.   Respiratory: Positive for apnea. Negative for cough, shortness of breath and wheezing.         +snoring   Cardiovascular: Negative for chest pain.   Gastrointestinal: Negative for abdominal pain, diarrhea, nausea and vomiting.   Genitourinary: Negative for dysuria and hematuria.   Skin: Negative for rash.   Neurological: Negative for dizziness, weakness and headache.       Objective   Vitals:    04/29/21 1041   BP: 124/82   Pulse: 82   Resp: 16   Temp: 98 °F (36.7 °C)   SpO2: 96%     Physical Exam  Constitutional:       Appearance: Normal appearance. He is well-developed.   HENT:      Head: Normocephalic and atraumatic.      Right Ear: Tympanic membrane, ear canal and external ear normal.      Left Ear: Tympanic membrane, ear canal and external ear normal.      Nose: Nose normal.      Mouth/Throat:      Mouth: Mucous membranes are moist.      Pharynx: Posterior oropharyngeal erythema present. No pharyngeal swelling.      Tonsils: No tonsillar exudate. 2+ on the right. 2+ on the left.   Eyes:      Conjunctiva/sclera: Conjunctivae normal.   Neck:      Thyroid: No thyromegaly.   Cardiovascular:      Rate and Rhythm: Normal rate and regular rhythm.      Heart sounds: No murmur heard.     Pulmonary:      Effort: Pulmonary effort is normal.      Breath sounds: Normal breath sounds. No wheezing or rales.   Musculoskeletal:      Cervical back: Neck supple.   Lymphadenopathy:      Head:      Right side of head: Tonsillar adenopathy present. No submandibular adenopathy.      Left side of head: No submandibular or tonsillar adenopathy.      Cervical: No cervical adenopathy.      Right cervical: No superficial  or posterior cervical adenopathy.     Left cervical: No superficial or posterior cervical adenopathy.   Skin:     Findings: No rash.   Neurological:      Mental Status: He is alert.   Psychiatric:         Behavior: Behavior normal.               Assessment/Plan   Diagnoses and all orders for this visit:    1. Sleep apnea, unspecified type (Primary)  -     Ambulatory Referral to Sleep Medicine    2. Snoring  -     Ambulatory Referral to Sleep Medicine    3. Pharyngitis, unspecified etiology  -     POC Rapid Strep A  -     azithromycin (Zithromax) 500 MG tablet; Take 1 tablet by mouth Daily for 5 days.  Dispense: 5 tablet; Refill: 0  -     Culture, Routine - Swab, Throat      Negative rapid strep, will send throat culture. Despite negative strep, will initiate azithromycin due to ongoing pharyngitis x3 weeks which was not resolved with Augmentin x10 days.   High suspicion ABIGAIL. Pt in agreement with sleep study referral.          Return if symptoms worsen or fail to improve.

## 2021-05-01 LAB — BACTERIA SPEC AEROBE CULT: NORMAL

## 2021-05-02 ENCOUNTER — TELEPHONE (OUTPATIENT)
Dept: INTERNAL MEDICINE | Facility: CLINIC | Age: 26
End: 2021-05-02

## 2021-05-02 NOTE — TELEPHONE ENCOUNTER
Please call pt and let him know throat cx was negative. Please see how he is feeling. If he is still having significant sore throat without improvement, let me know and I will send in steroids to take along with his Abx which I still want him to complete.

## 2021-05-03 NOTE — TELEPHONE ENCOUNTER
Attempted to contact pt, mailbox full.       Hub please advise:  Let him know throat culture was negative. Please see how he is feeling. If he is still having significant sore throat without improvement, let me know and I will send in steroids to take along with his antibiotic which I still want him to complete.

## 2021-05-05 NOTE — TELEPHONE ENCOUNTER
Attempted to contact pt, mailbox full.         Hub please advise:  Let him know throat culture was negative. Please see how he is feeling. If he is still having significant sore throat without improvement, let me know and I will send in steroids to take along with his antibiotic which I still want him to complete

## 2021-06-21 DIAGNOSIS — G47.00 INSOMNIA, UNSPECIFIED TYPE: ICD-10-CM

## 2021-06-22 RX ORDER — TRAZODONE HYDROCHLORIDE 50 MG/1
TABLET ORAL
Qty: 60 TABLET | Refills: 2 | Status: SHIPPED | OUTPATIENT
Start: 2021-06-22 | End: 2021-09-08

## 2021-09-05 DIAGNOSIS — F43.23 SITUATIONAL MIXED ANXIETY AND DEPRESSIVE DISORDER: ICD-10-CM

## 2021-09-05 DIAGNOSIS — G47.00 INSOMNIA, UNSPECIFIED TYPE: ICD-10-CM

## 2021-09-08 RX ORDER — ESCITALOPRAM OXALATE 10 MG/1
10 TABLET ORAL DAILY
Qty: 30 TABLET | Refills: 5 | Status: SHIPPED | OUTPATIENT
Start: 2021-09-08 | End: 2021-12-23

## 2021-09-08 RX ORDER — TRAZODONE HYDROCHLORIDE 50 MG/1
TABLET ORAL
Qty: 60 TABLET | Refills: 5 | Status: SHIPPED | OUTPATIENT
Start: 2021-09-08 | End: 2022-01-24

## 2021-12-23 ENCOUNTER — OFFICE VISIT (OUTPATIENT)
Dept: INTERNAL MEDICINE | Facility: CLINIC | Age: 26
End: 2021-12-23

## 2021-12-23 VITALS
WEIGHT: 264 LBS | RESPIRATION RATE: 18 BRPM | DIASTOLIC BLOOD PRESSURE: 82 MMHG | TEMPERATURE: 96.9 F | HEIGHT: 72 IN | SYSTOLIC BLOOD PRESSURE: 138 MMHG | HEART RATE: 76 BPM | BODY MASS INDEX: 35.76 KG/M2 | OXYGEN SATURATION: 96 %

## 2021-12-23 DIAGNOSIS — G47.09 OTHER INSOMNIA: ICD-10-CM

## 2021-12-23 DIAGNOSIS — F32.9 MAJOR DEPRESSIVE DISORDER, REMISSION STATUS UNSPECIFIED, UNSPECIFIED WHETHER RECURRENT: ICD-10-CM

## 2021-12-23 DIAGNOSIS — F41.9 ANXIETY: Primary | ICD-10-CM

## 2021-12-23 DIAGNOSIS — F43.21 GRIEF: ICD-10-CM

## 2021-12-23 PROCEDURE — 99214 OFFICE O/P EST MOD 30 MIN: CPT | Performed by: PHYSICIAN ASSISTANT

## 2021-12-23 RX ORDER — ZOLPIDEM TARTRATE 5 MG/1
5 TABLET ORAL NIGHTLY PRN
Qty: 30 TABLET | Refills: 0 | Status: SHIPPED | OUTPATIENT
Start: 2021-12-23 | End: 2022-01-24 | Stop reason: SDUPTHER

## 2022-01-24 ENCOUNTER — OFFICE VISIT (OUTPATIENT)
Dept: INTERNAL MEDICINE | Facility: CLINIC | Age: 27
End: 2022-01-24

## 2022-01-24 VITALS
WEIGHT: 268.2 LBS | HEART RATE: 97 BPM | OXYGEN SATURATION: 99 % | RESPIRATION RATE: 12 BRPM | BODY MASS INDEX: 36.33 KG/M2 | SYSTOLIC BLOOD PRESSURE: 132 MMHG | HEIGHT: 72 IN | DIASTOLIC BLOOD PRESSURE: 76 MMHG | TEMPERATURE: 97.3 F

## 2022-01-24 DIAGNOSIS — F41.9 ANXIETY: ICD-10-CM

## 2022-01-24 DIAGNOSIS — F43.21 GRIEF: ICD-10-CM

## 2022-01-24 DIAGNOSIS — Z79.899 HIGH RISK MEDICATION USE: Primary | ICD-10-CM

## 2022-01-24 DIAGNOSIS — G47.09 OTHER INSOMNIA: ICD-10-CM

## 2022-01-24 PROCEDURE — 99214 OFFICE O/P EST MOD 30 MIN: CPT | Performed by: PHYSICIAN ASSISTANT

## 2022-01-24 RX ORDER — SERTRALINE HYDROCHLORIDE 100 MG/1
100 TABLET, FILM COATED ORAL DAILY
Qty: 30 TABLET | Refills: 2 | Status: SHIPPED | OUTPATIENT
Start: 2022-01-24 | End: 2022-03-21 | Stop reason: SDUPTHER

## 2022-01-24 RX ORDER — ZOLPIDEM TARTRATE 5 MG/1
TABLET ORAL
Qty: 60 TABLET | Refills: 1 | Status: SHIPPED | OUTPATIENT
Start: 2022-01-24 | End: 2022-03-21

## 2022-01-25 ENCOUNTER — PRIOR AUTHORIZATION (OUTPATIENT)
Dept: INTERNAL MEDICINE | Facility: CLINIC | Age: 27
End: 2022-01-25

## 2022-01-25 NOTE — TELEPHONE ENCOUNTER
Estuardo Crooks (Key: [id number])  Drug  Zolpidem Tartrate 5MG tablets  Your information has been sent to [employer].

## 2022-01-31 NOTE — TELEPHONE ENCOUNTER
This request has received a Unfavorable outcome.    Please note any additional information provided by [facility] at the bottom of this request.      Denial letter placed on PCP desk for review, please advise?

## 2022-01-31 NOTE — TELEPHONE ENCOUNTER
Please let pt know that his insurance has a quantity limit of 1 tablet of Ambien per day, unfortunately, for 30 tablets per month. I would stick with this for now, and only consider moving up to 10mg if absolutely necessary.    no

## 2022-02-01 LAB — DRUGS UR: NORMAL

## 2022-02-15 ENCOUNTER — TELEMEDICINE (OUTPATIENT)
Dept: PSYCHIATRY | Facility: CLINIC | Age: 27
End: 2022-02-15

## 2022-02-15 DIAGNOSIS — F32.1 MDD (MAJOR DEPRESSIVE DISORDER), SINGLE EPISODE, MODERATE: ICD-10-CM

## 2022-02-15 DIAGNOSIS — F43.21 GRIEF: Primary | ICD-10-CM

## 2022-02-15 PROCEDURE — 90791 PSYCH DIAGNOSTIC EVALUATION: CPT | Performed by: COUNSELOR

## 2022-02-15 NOTE — PROGRESS NOTES
This provider is located at the [facility]), [address], [place] [address] using a secure [employer] Video Visit through [employer]. Patient is being seen remotely via telehealth at home address in [place] and stated they are in a secure environment for this session. The patient's condition being diagnosed/treated is appropriate for telemedicine. The provider identified herself as well as her credentials. The patient, and/or patients guardian, consent to be seen remotely, and when consent is given they understand that the consent allows for patient identifiable information to be sent to a third party as needed. They may refuse to be seen remotely at any time. The electronic data is encrypted and password protected, and the patient and/or guardian has been advised of the potential risks to privacy not withstanding such measures.     You have chosen to receive care through a telehealth visit.  Do you consent to use a video/audio connection for your medical care today? Yes    Subjective   Estuardo Crooks is a 26 y.o. male who presents today for initial evaluation  due to increased depression. Patient discussed the unexpected and traumatic loss of his father in December. Patient discussed vivid dreams, declined motivation, decreased energy, not feeling like himself, declined appetite, racing thoughts, increased irritability and constant state of worry. Patient expressed feeling as though he is running on auto . Patient discussed brain fog and feels as if the reality of his father's death hasn't happened yet. 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      Time: 1330  Name of PCP: Lilia Dueñas PA-C  Referral source: Lilia Dueñas PA-C    Chief Complaint:  depression    Patient  adamantly and convincingly denies current suicidal or homicidal ideation or perceptual disturbance.    Childhood Experiences:   Born in [place] and raised in [place], [place].  Raised by both mom and dad.   Half older sister on dad's side. Not super close; history of addiction.   Stable childhood; dad worked a lot but was present and mom stayed at home more.   Denies any history of abuse or neglect.     Significant Life Events:  Dad passed away in plan crash in December. Unexpectedly running three different business due to him passing.   Divorce last year; started in 2021. Was  for 7 years. She was mean to me and controlling. Having to file a restraining order due to her current behaviors.   Divorce finalized on .   Had my second daughter on .     Social History:   Social History     Socioeconomic History   • Marital status: Significant Other   Tobacco Use   • Smoking status: Former Smoker     Packs/day: 0.50     Years: 0.00     Pack years: 0.00     Types: Cigarettes     Quit date: 2021     Years since quittin.0   • Smokeless tobacco: Former User   Vaping Use   • Vaping Use: Some days   • Substances: Nicotine   • Devices: Pre-filled or refillable cartridge, Refillable tank   • Passive vaping exposure: Yes   Substance and Sexual Activity   • Alcohol use: Yes     Comment: occ.   • Drug use: No     Marital Status:   Children: 2 daughter    Patient's current living situation: me and girlfriend     Support system: girlfriend     Difficulty getting along with peers: no but feelings uneasy and anxious in social settings. Has prevented from doing and completing tasks.     Difficulty making new friendships: no    Difficulty maintaining friendships: no    Close with family members: yes; close to mother     Religous: yes, Amish but not super involved.     Work History:  Highest level of education obtained: college    Ever been active duty in the ?  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    Past Medical History:  Past Medical History:   Diagnosis Date   • Depression    • GERD (gastroesophageal reflux disease)        Past Surgical History:  Past Surgical History:   Procedure Laterality Date   • ROOT CANAL  02/2020       Physical Exam:   There were no vitals taken for this visit. There is no height or weight on file to calculate BMI.     History of prior treatment or hospitalization: marriage therapy in the past. Denies any inpatient treatment.     Are there any significant health issues (current or past): Denies     History of seizures: no    Allergy:   No Known Allergies     Current Medications:   Current Outpatient Medications   Medication Sig Dispense Refill   • sertraline (Zoloft) 100 MG tablet Take 1 tablet by mouth Daily. 30 tablet 2   • zolpidem (Ambien) 5 MG tablet Take 1 to 2 tablets by mouth as needed for sleep. 60 tablet 1     No current facility-administered medications for this visit.       Lab Results:   Office Visit on 01/24/2022   Component Date Value Ref Range Status   • Report Summary 01/24/2022 FINAL   Final    Comment: ====================================================================  [facility] DRUG ANALYSIS,[facility]  ====================================================================  Test                             Result       Flag       Units  Drug Present    Zolpidem Acid                  PRESENT     Zolpidem acid is an expected metabolite of zolpidem.    Sertraline                     PRESENT    Desmethylsertraline            PRESENT     Desmethylsertraline is an expected metabolite of sertraline.  ====================================================================  Test                      Result    Flag   Units      Ref Range    Creatinine              142              mg/dL       >=20  ====================================================================  Declared Medications:   Medication list was not provided.  ====================================================================  For clinical consultation, please call [phone].  ===================================================                           =================         Family History:  Family History   Problem Relation Age of Onset   • Arthritis Father    • Colon polyps Father    • Heart attack Maternal Grandmother    • COPD Maternal Grandmother    • Diabetes Maternal Grandfather        Problem List:  There is no problem list on file for this patient.      History of Substance Use:   Patient answered no  to experiencing two or more of the following problems related to substance use: using more than intended or over longer period than intended; difficulty quitting or cutting back use; spending a great deal of time obtaining, using, or recovering from using; craving or strong desire or urge to use;  work and/or school problems; financial problems; family problems; using in dangerous situations; physical or mental health problems; relapse; feelings of guilt or remorse about use; times when used and/or drank alone; needing to use more in order to achieve the desired effect; illness or withdrawal when stopping or cutting back use; using to relieve or avoid getting ill or developing withdrawal symptoms; and black outs and/or memory issues when using.      SUICIDE RISK ASSESSMENT/CSSRS  1. Does patient have thoughts of suicide? no  2. Does patient have intent for suicide? no  3. Does patient have a current plan for suicide? no  4. History of suicide attempts: no  5. Family history of suicide or attempts: no  6. History of violent behaviors towards others or property or thoughts of committing suicide: no  7. History of sexual aggression toward others: no  8. Access to firearms or weapons: yes    PHQ-Score Total:  PHQ-9 Total  Score: (P) 15    SALLIE-7 Score Total: 8    Mental Status Exam:   Hygiene:   good  Cooperation:  Cooperative  Eye Contact:  Good  Psychomotor Behavior:  Appropriate  Affect:  Full range  Mood: depressed  Speech:  Normal  Thought Process:  Linear  Thought Content:  Mood congruent  Suicidal:  None  Homicidal:  None  Hallucinations:  None  Delusion:  None  Memory:  Intact  Orientation:  Person, Place, Time and Situation  Reliability:  fair  Insight:  Fair  Judgement:  Fair  Impulse Control:  Fair    Impression/Formulation:    Patient appeared alert and oriented.  Patient is voluntarily requesting to begin outpatient therapy at [facility].  Patient is receptive to assistance with maintaining a stable lifestyle.  Patient presents with history of depression and loss.  Patient is agreeable to attend routine therapy sessions.  Patient expressed desire to maintain stability and participate in the therapeutic process.        Assessment/Plan   Diagnoses and all orders for this visit:    1. Grief (Primary)    2. MDD (major depressive disorder), single episode, moderate (HCC)        Visit Diagnoses:    ICD-10-CM ICD-9-CM   1. Grief  F43.21 309.0   2. MDD (major depressive disorder), single episode, moderate (HCC)  F32.1 296.22        Functional Status: Mild impairment     Prognosis: Fair with Ongoing Treatment     Treatment Plan: Continue supportive psychotherapy efforts and medications as indicated. Obtain release of information for current treatment team for continuity of care as needed. Patient will adhere to medication regimen as prescribed and report any side effects. Patient will contact this office, call 911 or present to the nearest emergency room should suicidal or homicidal ideations occur.    Short Term Goals: Patient will be compliant with medication, and patient will have no significant medication related side effects.  Patient will be engaged in psychotherapy as indicated.  Patient  will report subjective improvement of symptoms.    Long Term Goals: To stabilize mood and treat/improve subjective symptoms, the patient will stay out of the hospital, the patient will be at an optimal level of functioning, and the patient will take all medications as prescribed.The patient verbalized understanding and agreement with goals that were mutually set.    Crisis Plan:    If symptoms/behaviors persist, patient will present to the nearest hospital for an assessment. Advised patient of [facility] 24/7 assessment services.     [facility] No Show Policy:  We understand unexpected circumstances arise; however, anytime you miss your appointment we are unable to provide you appropriate care.  In addition, each appointment missed could have been used to provide care for others.  We ask that you call at least 24 hours in advance to cancel or reschedule an appointment.  We would like to take this opportunity to remind you of our policy stating patients who miss THREE or more appointments without cancelling or rescheduling 24 hours in advance of the appointment may be subject to cancellation of any further visits with our clinic and recommendation to seek in-person services/visits.    Please call [phone] to reschedule your appointment. If there are reasons that make it difficult for you to keep the appointments, please call and let us know how we can help.  Please understand that medication prescribing will not continue without seeing your provider.      [facility]'s No Show Policy reviewed with patient at today's visit. Patient verbalized understanding of this policy. Discussed with patient that in the event that there are three or more no show visits, it will be recommended that they pursue in-person services/visits as noncompliance with telehealth visits indicates that patient is not an appropriate candidate for telemedicine and would likely be more  appropriate for in-person services/visits. Patient verbalizes understanding and is agreeable to this.           This document has been electronically signed by Glo Ott LCSW  February 16, 2022 10:00 EST    Part of this note may be an electronic transcription/translation of spoken language to printed text using the Dragon Dictation System.

## 2022-03-14 ENCOUNTER — TELEMEDICINE (OUTPATIENT)
Dept: PSYCHIATRY | Facility: CLINIC | Age: 27
End: 2022-03-14

## 2022-03-14 DIAGNOSIS — F32.1 MDD (MAJOR DEPRESSIVE DISORDER), SINGLE EPISODE, MODERATE: Primary | ICD-10-CM

## 2022-03-14 DIAGNOSIS — F43.21 GRIEF: ICD-10-CM

## 2022-03-14 PROCEDURE — 90832 PSYTX W PT 30 MINUTES: CPT | Performed by: COUNSELOR

## 2022-03-14 NOTE — PROGRESS NOTES
Date: March 21, 2022  Time In: 1433  Time Out: 1503  This provider is located at the [facility]), [address], [place] [address] using a secure [employer] Video Visit through [employer]. Patient is being seen remotely via telehealth at home address in [place] and stated they are in a secure environment for this session. The patient's condition being diagnosed/treated is appropriate for telemedicine. The provider identified herself as well as her credentials. The patient, and/or patients guardian, consent to be seen remotely, and when consent is given they understand that the consent allows for patient identifiable information to be sent to a third party as needed. They may refuse to be seen remotely at any time. The electronic data is encrypted and password protected, and the patient and/or guardian has been advised of the potential risks to privacy not withstanding such measures.     You have chosen to receive care through a telehealth visit.  Do you consent to use a video/audio connection for your medical care today? Yes    PROGRESS NOTE  Data:  Estuardo Crooks is a 26 y.o. 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 Pt reports trying to manage his new role without any guidance from his father. Patient reports the loss of his father and the impact it has made on him in different aspects of life.     Clinical Maneuvering/Intervention:    (Scales based on 0 - 10 with 10 being the worst)  Depression: 8-10 Anxiety: 8-10     SALLIE-7  Feeling nervous, anxious or on edge: (P) More than half the days  Not being able to  stop or control worrying: (P) More than half the days  Worrying too much about different things: (P) Nearly every day  Trouble Relaxing: (P) More than half the days  Being so restless that it is hard to sit still: (P) Several days  Feeling afraid as if something awful might happen: (P) More than half the days  Becoming easily annoyed or irritable: (P) More than half the days  SALLIE 7 Total Score: (P) 14  If you checked any problems, how difficult have these problems made it for you to do your work, take care of things at home, or get along with other people: (P) Very difficult   PHQ-9 Total Score: (P) 17     Assisted patient in processing above session content; acknowledged and normalized patient’s thoughts, feelings, and concerns.  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  Discussed triggers associated with patient's anxiety, loss, and depression.  Also discussed coping skills for patient to implement such as taking advantage of time to self; providing examples of maximizing this time to complete self care practices. Also discussed implementing mantra.     Allowed patient to freely discuss issues without interruption or judgment. Provided safe, confidential environment to facilitate the development of positive therapeutic relationship and encourage open, honest communication. Assisted patient in identifying risk factors which would indicate the need for higher level of care including thoughts to harm self or others and/or self-harming behavior and encouraged patient to contact this office, call 911, or present to the nearest emergency room should any of these events occur. Discussed crisis intervention services and means to access. Patient adamantly and convincingly denies current suicidal or homicidal ideation or perceptual disturbance.    Assessment:   Assessment   Patient appears to maintain relative stability as compared to their baseline.  However, patient  continues to struggle with depression and grief which continues to cause impairment in important areas of functioning.  A result, they can be reasonably expected to continue to benefit from treatment and would likely be at increased risk for decompensation otherwise.    Mental Status Exam:   Hygiene:   good  Cooperation:  Cooperative  Eye Contact:  Good  Psychomotor Behavior:  Appropriate  Affect:  Full range  Mood: normal  Speech:  Normal  Thought Process:  Linear  Thought Content:  Normal and Mood congruent  Suicidal:  None  Homicidal:  None  Hallucinations:  None  Delusion:  None  Memory:  Intact  Orientation:  Person, Place, Time and Situation  Reliability:  fair  Insight:  Fair  Judgement:  Fair  Impulse Control:  Fair  Physical/Medical Issues:  No      Patient's Support Network Includes:  significant other    Functional Status: Mild impairment     Progress toward goal: Not at goal    Prognosis: Fair with Ongoing Treatment     Plan:    Patient will continue in individual outpatient therapy with focus on improved functioning and coping skills, maintaining stability, and avoiding decompensation and the need for higher level of care.    Patient will adhere to medication regimen as prescribed and report any side effects. Patient will contact this office, call 911 or present to the nearest emergency room should suicidal or homicidal ideations occur. Provide Cognitive Behavioral Therapy and Solution Focused Therapy to improve functioning, maintain stability, and avoid decompensation and the need for higher level of care.     Return in about 2 weeks, or earlier if symptoms worsen or fail to improve.    VISIT DIAGNOSIS:     ICD-10-CM ICD-9-CM   1. MDD (major depressive disorder), single episode, moderate (HCC)  F32.1 296.22   2. Grief  F43.21 309.0        [facility] No Show Policy:  We understand unexpected circumstances arise; however, anytime you miss your appointment we are unable to provide  you appropriate care.  In addition, each appointment missed could have been used to provide care for others.  We ask that you call at least 24 hours in advance to cancel or reschedule an appointment.  We would like to take this opportunity to remind you of our policy stating patients who miss THREE or more appointments without cancelling or rescheduling 24 hours in advance of the appointment may be subject to cancellation of any further visits with our clinic and recommendation to seek in-person services/visits.    Please call [phone] to reschedule your appointment. If there are reasons that make it difficult for you to keep the appointments, please call and let us know how we can help.  Please understand that medication prescribing will not continue without seeing your provider.      [facility]'s No Show Policy reviewed with patient at today's visit. Patient verbalized understanding of this policy. Discussed with patient that in the event that there are three or more no show visits, it will be recommended that they pursue in-person services/visits as noncompliance with telehealth visits indicates that patient is not an appropriate candidate for telemedicine and would likely be more appropriate for in-person services/visits. Patient verbalizes understanding and is agreeable to this.        This document has been electronically signed by Glo Ott LCSW  March 21, 2022 11:51 EDT      Part of this note may be an electronic transcription/translation of spoken language to printed text using the Dragon Dictation System.

## 2022-03-21 ENCOUNTER — OFFICE VISIT (OUTPATIENT)
Dept: INTERNAL MEDICINE | Facility: CLINIC | Age: 27
End: 2022-03-21

## 2022-03-21 VITALS
BODY MASS INDEX: 36.3 KG/M2 | DIASTOLIC BLOOD PRESSURE: 84 MMHG | HEART RATE: 86 BPM | WEIGHT: 268 LBS | SYSTOLIC BLOOD PRESSURE: 120 MMHG | TEMPERATURE: 98.2 F | OXYGEN SATURATION: 96 % | HEIGHT: 72 IN | RESPIRATION RATE: 16 BRPM

## 2022-03-21 DIAGNOSIS — F43.21 GRIEF: ICD-10-CM

## 2022-03-21 DIAGNOSIS — F41.9 ANXIETY: ICD-10-CM

## 2022-03-21 DIAGNOSIS — G47.09 OTHER INSOMNIA: ICD-10-CM

## 2022-03-21 PROCEDURE — 99213 OFFICE O/P EST LOW 20 MIN: CPT | Performed by: PHYSICIAN ASSISTANT

## 2022-03-21 RX ORDER — ZOLPIDEM TARTRATE 5 MG/1
5 TABLET ORAL NIGHTLY PRN
Qty: 30 TABLET | Refills: 2 | Status: CANCELLED | OUTPATIENT
Start: 2022-03-21

## 2022-03-21 RX ORDER — SERTRALINE HYDROCHLORIDE 100 MG/1
100 TABLET, FILM COATED ORAL DAILY
Qty: 30 TABLET | Refills: 5 | Status: SHIPPED | OUTPATIENT
Start: 2022-03-21 | End: 2022-10-04 | Stop reason: SDUPTHER

## 2022-03-21 RX ORDER — QUETIAPINE FUMARATE 50 MG/1
50 TABLET, FILM COATED ORAL NIGHTLY
Qty: 30 TABLET | Refills: 2 | Status: SHIPPED | OUTPATIENT
Start: 2022-03-21 | End: 2022-06-06

## 2022-03-21 NOTE — PROGRESS NOTES
"Chief Complaint   Patient presents with   • Anxiety     2 month F/U       Subjective       History of Present Illness     Estuardo Crooks is a 26 y.o. male. The patient presents today for follow up on anxiety. He is taking Zoloft and reports his anxiety is about the same with \"good and bad days\". The patient still reports notable stress from his work. He has a good support system. He has had two 30-minute counseling sessions but does not feel they have been particularly helpful yet; he does like his counselor. The patient does have minor side effects from the Zoloft, but declines any medication modifications at this time. He does try to make a list of goals for the next day and does talk to his girlfriend about his thoughts. He does feel like there are a lot of thoughts in his head when he goes to bed and when he wakes up.     He reports his sleep varies, but on average, he gets around 5 to 6 hours of sleep at night with his Ambien. The patient reports more difficulty staying asleep than falling asleep; he will wake at 4 or 5 AM and then be unable to fall back asleep. He denies any benefit from the Ambien and notes no increased benefit with the increased 2 tablet dose. He feels that the Ambien is \"not cutting it\" and would like to try a different medication. The patient has previously tried trazodone and melatonin without benefit. He has not tried any supplements.     The following portions of the patient's history were reviewed and updated as appropriate: allergies, current medications, past medical history, past social history and problem list.    No Known Allergies  Social History     Tobacco Use   • Smoking status: Former Smoker     Packs/day: 0.50     Years: 0.00     Pack years: 0.00     Types: Cigarettes     Quit date: 2021     Years since quittin.1   • Smokeless tobacco: Former User   Substance Use Topics   • Alcohol use: Yes     Comment: occ.         Current Outpatient Medications:   •  " sertraline (Zoloft) 100 MG tablet, Take 1 tablet by mouth Daily., Disp: 30 tablet, Rfl: 5  •  QUEtiapine (SEROquel) 50 MG tablet, Take 1 tablet by mouth Every Night., Disp: 30 tablet, Rfl: 2    Review of Systems   Constitutional: Negative for chills, fatigue and fever.   HENT: Negative for congestion, ear pain, sore throat and trouble swallowing.    Eyes: Negative for pain.   Respiratory: Negative for cough, shortness of breath and wheezing.    Cardiovascular: Negative for chest pain and palpitations.   Gastrointestinal: Negative for abdominal pain, diarrhea, nausea and vomiting.   Genitourinary: Negative for dysuria and hematuria.   Musculoskeletal: Negative for back pain.   Skin: Negative for rash.   Allergic/Immunologic: Negative for immunocompromised state.   Neurological: Negative for dizziness, syncope, weakness and headache.   Psychiatric/Behavioral: Positive for sleep disturbance. Negative for depressed mood. The patient is nervous/anxious.        Objective   Vitals:    03/21/22 1449   BP: 120/84   Pulse: 86   Resp: 16   Temp: 98.2 °F (36.8 °C)   SpO2: 96%     Body mass index is 36.35 kg/m².    Physical Exam  Constitutional:       Appearance: Normal appearance. He is well-developed.   HENT:      Head: Normocephalic and atraumatic.      Nose: Nose normal.      Mouth/Throat:      Mouth: Mucous membranes are moist.      Pharynx: Oropharynx is clear.   Eyes:      Conjunctiva/sclera: Conjunctivae normal.   Neck:      Thyroid: No thyromegaly.   Cardiovascular:      Rate and Rhythm: Normal rate and regular rhythm.      Heart sounds: No murmur heard.  Pulmonary:      Effort: Pulmonary effort is normal.      Breath sounds: Normal breath sounds. No wheezing or rales.   Musculoskeletal:      Cervical back: Neck supple.   Lymphadenopathy:      Cervical: No cervical adenopathy.   Skin:     Findings: No rash.   Psychiatric:         Mood and Affect: Mood normal.         Behavior: Behavior normal.                Assessment/Plan   Diagnoses and all orders for this visit:    1. Grief  - Continue Zoloft.   -     sertraline (Zoloft) 100 MG tablet; Take 1 tablet by mouth Daily.  Dispense: 30 tablet; Refill: 5    2. Anxiety  - Continue Zoloft.   -     sertraline (Zoloft) 100 MG tablet; Take 1 tablet by mouth Daily.  Dispense: 30 tablet; Refill: 5    3. Other insomnia  - We will start him on Seroquel. Risks and benefits of this medication discussed. If he does not notice benefit within 2 weeks, he will follow up. Discussed recommended supplements for insomnia as well. Advised coping strategies as well such as making a physical to-do list and journaling.    -     QUEtiapine (SEROquel) 50 MG tablet; Take 1 tablet by mouth Every Night.  Dispense: 30 tablet; Refill: 2             Return in about 3 months (around 6/21/2022) for Annual physical.          Transcribed from ambient dictation for Lilia Dueñas PA-C by Lizeth Adames.  03/21/22   15:12 EDT    Patient verbalized consent to the visit recording.  I have personally performed the services described in this document as transcribed by the above individual, and it is both accurate and complete.  Lilia Dueñas PA-C  3/23/2022  19:05 EDT

## 2022-04-18 ENCOUNTER — TELEMEDICINE (OUTPATIENT)
Dept: PSYCHIATRY | Facility: CLINIC | Age: 27
End: 2022-04-18

## 2022-04-18 DIAGNOSIS — F32.1 MDD (MAJOR DEPRESSIVE DISORDER), SINGLE EPISODE, MODERATE: Primary | ICD-10-CM

## 2022-04-18 PROCEDURE — 90832 PSYTX W PT 30 MINUTES: CPT | Performed by: COUNSELOR

## 2022-04-18 NOTE — PROGRESS NOTES
Date: April 18, 2022  Time In: 1007  Time Out: 1034  This provider is located at the [facility] ([facility]), [address], [place] [address] using a secure [employer] Video Visit through [employer]. Patient is being seen remotely via telehealth at home address in [place] and stated they are in a secure environment for this session. The patient's condition being diagnosed/treated is appropriate for telemedicine. The provider identified herself as well as her credentials. The patient, and/or patients guardian, consent to be seen remotely, and when consent is given they understand that the consent allows for patient identifiable information to be sent to a third party as needed. They may refuse to be seen remotely at any time. The electronic data is encrypted and password protected, and the patient and/or guardian has been advised of the potential risks to privacy not withstanding such measures.     You have chosen to receive care through a telehealth visit.  Do you consent to use a video/audio connection for your medical care today? Yes    PROGRESS NOTE  Data:  Estuardo Crooks is a 27 y.o. male who presents today for follow up    Chief Complaint: Depression     History of Present Illness: Pt reports current stressors; work has been steady with belief that no further lay offs are needed at this time. 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 Pt discussed this issue involving the court and how it upsets him.     Clinical Maneuvering/Intervention:    (Scales based on 0 - 10 with 10 being the worst)  Depression: 8-10 Anxiety: 8-10     SALLIE-7  Feeling nervous, anxious or on edge: (P) Nearly every day  Not being able to stop or control worrying: (P) Several days  Worrying too much about different things: (P) Nearly  every day  Trouble Relaxing: (P) Several days  Being so restless that it is hard to sit still: (P) Not at all  Feeling afraid as if something awful might happen: (P) More than half the days  Becoming easily annoyed or irritable: (P) Several days  SALLIE 7 Total Score: (P) 11  If you checked any problems, how difficult have these problems made it for you to do your work, take care of things at home, or get along with other people: (P) Very difficult   PHQ-9 Total Score: (P) 15     Assisted patient in processing above session content; acknowledged and normalized patient’s thoughts, feelings, and concerns.  Rationalized patient thought process regarding feeling overwhelmed and stressed.  Discussed triggers associated with patient's mood.  Also discussed coping skills for patient to implement such as trying his best not to entertain what if scenarios by focusing on the tasks needed out of each day only.     Allowed patient to freely discuss issues without interruption or judgment. Provided safe, confidential environment to facilitate the development of positive therapeutic relationship and encourage open, honest communication. Assisted patient in identifying risk factors which would indicate the need for higher level of care including thoughts to harm self or others and/or self-harming behavior and encouraged patient to contact this office, call 911, or present to the nearest emergency room should any of these events occur. Discussed crisis intervention services and means to access. Patient adamantly and convincingly denies current suicidal or homicidal ideation or perceptual disturbance.    Assessment:   Assessment   Patient appears to maintain relative stability as compared to their baseline.  However, patient continues to struggle with depression which continues to cause impairment in important areas of functioning.  A result, they can be reasonably expected to continue to benefit from treatment and would likely be at  increased risk for decompensation otherwise.    Mental Status Exam:   Hygiene:   good  Cooperation:  Cooperative  Eye Contact:  Good  Psychomotor Behavior:  Appropriate  Affect:  Appropriate  Mood: normal  Speech:  Normal  Thought Process:  Linear  Thought Content:  Normal  Suicidal:  None  Homicidal:  None  Hallucinations:  None  Delusion:  None  Memory:  Intact  Orientation:  Person, Place, Time and Situation  Reliability:  fair  Insight:  Fair  Judgement:  Fair  Impulse Control:  Fair  Physical/Medical Issues:  No        Patient's Support Network Includes:  significant other    Functional Status: Mild impairment     Progress toward goal: Not at goal    Prognosis: Fair with Ongoing Treatment          Plan:    Patient will continue in individual outpatient therapy with focus on improved functioning and coping skills, maintaining stability, and avoiding decompensation and the need for higher level of care.    Patient will adhere to medication regimen as prescribed and report any side effects. Patient will contact this office, call 911 or present to the nearest emergency room should suicidal or homicidal ideations occur. Provide Cognitive Behavioral Therapy and Solution Focused Therapy to improve functioning, maintain stability, and avoid decompensation and the need for higher level of care.     Return in about 3 weeks, or earlier if symptoms worsen or fail to improve.           VISIT DIAGNOSIS:     ICD-10-CM ICD-9-CM   1. MDD (major depressive disorder), single episode, moderate ([facility])  F32.1 296.22          [facility] No Show Policy:  We understand unexpected circumstances arise; however, anytime you miss your appointment we are unable to provide you appropriate care.  In addition, each appointment missed could have been used to provide care for others.  We ask that you call at least 24 hours in advance to cancel or reschedule an appointment.  We would like to take this opportunity to remind  you of our policy stating patients who miss THREE or more appointments without cancelling or rescheduling 24 hours in advance of the appointment may be subject to cancellation of any further visits with our clinic and recommendation to seek in-person services/visits.    Please call [phone] to reschedule your appointment. If there are reasons that make it difficult for you to keep the appointments, please call and let us know how we can help.  Please understand that medication prescribing will not continue without seeing your provider.      [facility]'s No Show Policy reviewed with patient at today's visit. Patient verbalized understanding of this policy. Discussed with patient that in the event that there are three or more no show visits, it will be recommended that they pursue in-person services/visits as noncompliance with telehealth visits indicates that patient is not an appropriate candidate for telemedicine and would likely be more appropriate for in-person services/visits. Patient verbalizes understanding and is agreeable to this.        This document has been electronically signed by Glo Ott LCSW  April 18, 2022 11:20 EDT      Part of this note may be an electronic transcription/translation of spoken language to printed text using the Dragon Dictation System.

## 2022-05-09 ENCOUNTER — LAB (OUTPATIENT)
Dept: LAB | Facility: HOSPITAL | Age: 27
End: 2022-05-09

## 2022-05-09 ENCOUNTER — OFFICE VISIT (OUTPATIENT)
Dept: INTERNAL MEDICINE | Facility: CLINIC | Age: 27
End: 2022-05-09

## 2022-05-09 VITALS
WEIGHT: 265.8 LBS | SYSTOLIC BLOOD PRESSURE: 110 MMHG | RESPIRATION RATE: 18 BRPM | TEMPERATURE: 99.1 F | HEART RATE: 91 BPM | BODY MASS INDEX: 36.05 KG/M2 | DIASTOLIC BLOOD PRESSURE: 68 MMHG | OXYGEN SATURATION: 98 %

## 2022-05-09 DIAGNOSIS — J22 LOWER RESPIRATORY INFECTION: Primary | ICD-10-CM

## 2022-05-09 DIAGNOSIS — R50.9 FEVER AND CHILLS: ICD-10-CM

## 2022-05-09 LAB
EXPIRATION DATE: NORMAL
EXPIRATION DATE: NORMAL
FLUAV AG NPH QL: NEGATIVE
FLUBV AG NPH QL: NEGATIVE
INTERNAL CONTROL: NORMAL
INTERNAL CONTROL: NORMAL
Lab: NORMAL
Lab: NORMAL
S PYO AG THROAT QL: NEGATIVE

## 2022-05-09 PROCEDURE — U0004 COV-19 TEST NON-CDC HGH THRU: HCPCS | Performed by: PHYSICIAN ASSISTANT

## 2022-05-09 PROCEDURE — C9803 HOPD COVID-19 SPEC COLLECT: HCPCS | Performed by: PHYSICIAN ASSISTANT

## 2022-05-09 PROCEDURE — 99213 OFFICE O/P EST LOW 20 MIN: CPT | Performed by: PHYSICIAN ASSISTANT

## 2022-05-09 PROCEDURE — 87804 INFLUENZA ASSAY W/OPTIC: CPT | Performed by: PHYSICIAN ASSISTANT

## 2022-05-09 PROCEDURE — 87880 STREP A ASSAY W/OPTIC: CPT | Performed by: PHYSICIAN ASSISTANT

## 2022-05-09 RX ORDER — BENZONATATE 100 MG/1
100 CAPSULE ORAL 3 TIMES DAILY PRN
Qty: 30 CAPSULE | Refills: 0 | Status: SHIPPED | OUTPATIENT
Start: 2022-05-09 | End: 2022-06-20

## 2022-05-09 RX ORDER — AMOXICILLIN AND CLAVULANATE POTASSIUM 875; 125 MG/1; MG/1
1 TABLET, FILM COATED ORAL 2 TIMES DAILY
Qty: 20 TABLET | Refills: 0 | Status: SHIPPED | OUTPATIENT
Start: 2022-05-09 | End: 2022-06-20

## 2022-05-09 RX ORDER — AZITHROMYCIN 250 MG/1
TABLET, FILM COATED ORAL
Qty: 6 TABLET | Refills: 0 | Status: SHIPPED | OUTPATIENT
Start: 2022-05-09 | End: 2022-06-20

## 2022-05-09 NOTE — PROGRESS NOTES
Chief Complaint   Patient presents with   • Nasal Congestion   • Fever     102 last night       Subjective       History of Present Illness     Estuardo Crooks is a 27 y.o. male. He presents with concerns for URI.     The patient reports that his symptoms started last week after he was mowing grass. It started with congestion and a slight sore throat. On 05/04/2022 night or 05/05/2022 he experienced laryngitis. He went to work on 05/05/2022. On 05/06/2022, he continued to have the same symptoms and he felt fatigued, because he did not sleep well the night before. He decided to take off work and stay at home and rest. On 05/06/2022 at night, he started to have a fever and then on 05/07/2022, he felt awful. On 05/07/2022 at night, he had a  fever at 102.5 degrees Fahrenheit. He states that his fiancé has been monitoring his temperature and this morning 05/09/2022 at 5:58 A.M. it was 102.4 degrees Fahrenheit.     He has been taking Advil, which has help to decrease his fever. The patient takes his regular sleeping medicine and he has been taking Nyquil before bed at 10:30 P.M. or 11:00 P.M. He notes that he wakes up at 2:00 A.M. feeling cold and he has to have a shower to warm himself up. The patient has taken 3 home COVID-19 tests, which were all negative. He has been taking Mucinex and Nyquil, but they have not been helping. He denies being around that has been ill. The patient has had 2 vaccinations of the COVID-19 vaccine, but he has not had a booster yet.    The patient has a cough and he has been expectorating green colored phlegm. He denies having a headache. His laryngitis resolved after 1 to 1.5 days. He has been experiencing wheezing and shortness of breath, mostly when he is moving around and he starts coughing more. The patient denies having any diarrhea or vomiting. He did feel slightly nauseated after drinking a lot of cold water while in the shower this morning. He has felt some body aches a couple  times, but it was manageable.    The following portions of the patient's history were reviewed and updated as appropriate: allergies, current medications, past medical history and problem list.    No Known Allergies  Social History     Tobacco Use   • Smoking status: Former Smoker     Packs/day: 0.50     Years: 0.00     Pack years: 0.00     Types: Cigarettes     Quit date: 2021     Years since quittin.2   • Smokeless tobacco: Former User   Substance Use Topics   • Alcohol use: Yes     Comment: occ.         Current Outpatient Medications:   •  QUEtiapine (SEROquel) 50 MG tablet, Take 1 tablet by mouth Every Night., Disp: 30 tablet, Rfl: 2  •  sertraline (Zoloft) 100 MG tablet, Take 1 tablet by mouth Daily., Disp: 30 tablet, Rfl: 5  •  amoxicillin-clavulanate (Augmentin) 875-125 MG per tablet, Take 1 tablet by mouth 2 (Two) Times a Day., Disp: 20 tablet, Rfl: 0  •  azithromycin (Zithromax) 250 MG tablet, Take 2 tablets the first day, then 1 tablet daily for 4 days., Disp: 6 tablet, Rfl: 0  •  benzonatate (Tessalon Perles) 100 MG capsule, Take 1 capsule by mouth 3 (Three) Times a Day As Needed for Cough., Disp: 30 capsule, Rfl: 0    Review of Systems   Constitutional: Positive for chills, fatigue and fever.   HENT: Positive for congestion, sore throat and voice change. Negative for ear pain and trouble swallowing.    Eyes: Negative for pain.   Respiratory: Positive for cough and wheezing. Negative for shortness of breath.         +DIAZ   Cardiovascular: Negative for chest pain and palpitations.   Gastrointestinal: Positive for nausea. Negative for abdominal pain, diarrhea and vomiting.   Genitourinary: Negative for dysuria and hematuria.   Musculoskeletal: Negative for back pain.   Skin: Negative for rash.   Allergic/Immunologic: Negative for immunocompromised state.   Neurological: Negative for dizziness, weakness and headache.       Objective   Vitals:    22 1003   BP: 110/68   Pulse: 91   Resp: 18    Temp: 99.1 °F (37.3 °C)   SpO2: 98%     Body mass index is 36.05 kg/m².    Physical Exam  Constitutional:       Appearance: Normal appearance. He is well-developed.   HENT:      Head: Normocephalic and atraumatic.      Right Ear: Tympanic membrane, ear canal and external ear normal.      Left Ear: Tympanic membrane, ear canal and external ear normal.      Nose: Nose normal.   Eyes:      Conjunctiva/sclera: Conjunctivae normal.      Pupils: Pupils are equal, round, and reactive to light.   Neck:      Thyroid: No thyromegaly.      Vascular: No carotid bruit.   Cardiovascular:      Rate and Rhythm: Normal rate and regular rhythm.      Heart sounds: No murmur heard.  Pulmonary:      Effort: Pulmonary effort is normal.      Breath sounds: Normal breath sounds. No wheezing or rales.   Musculoskeletal:      Cervical back: Normal range of motion and neck supple.   Lymphadenopathy:      Cervical: No cervical adenopathy.   Skin:     Findings: No rash.   Psychiatric:         Behavior: Behavior normal.               Assessment/Plan   Diagnoses and all orders for this visit:    1. Lower respiratory infection (Primary)  -     POC Influenza A / B  -     COVID-19 PCR, [facility], NP SWAB IN [facility]SPORT MEDIA/ORAL SWISH 24-30 HR TAT - Swab, Nasopharynx; Future  -     azithromycin (Zithromax) 250 MG tablet; Take 2 tablets the first day, then 1 tablet daily for 4 days.  Dispense: 6 tablet; Refill: 0  -     amoxicillin-clavulanate (Augmentin) 875-125 MG per tablet; Take 1 tablet by mouth 2 (Two) Times a Day.  Dispense: 20 tablet; Refill: 0  -     benzonatate (Tessalon Perles) 100 MG capsule; Take 1 capsule by mouth 3 (Three) Times a Day As Needed for Cough.  Dispense: 30 capsule; Refill: 0    2. Fever and chills  -     POC Influenza A / B  -     COVID-19 PCR, [facility], NP SWAB IN [facility] TRANSPORT MEDIA/ORAL SWISH 24-30 HR TAT - Swab, Nasopharynx; Future  -     POC Rapid Strep A      - Influenza A and B test  administered, which were negative.   - Streptococcal test administered, which was negative.  - COVID-19 test administered, we will let him know when test results are available.  - He will start taking Tessalon Perles as prescribed.  - Take Delsym cough syrup as directed.    - He will start taking Augmentin twice a day for 10 days.  - The patient will start taking a Z-trae; 2 tablets today, then 1 tablet daily for 4 days.  - He should rest and sleep when he can.  - If the patient is not feeling better by 05/12/2022 or 05/13/2022 let me know, and we may then order a chest x-ray.           Return if symptoms worsen or fail to improve.      Transcribed from ambient dictation for Lilia Dueñas PA-C by Autumn Jonas.  05/09/22   13:38 EDT    Patient verbalized consent to the visit recording.

## 2022-05-10 LAB — SARS-COV-2 RNA NOSE QL NAA+PROBE: NOT DETECTED

## 2022-05-12 ENCOUNTER — TELEPHONE (OUTPATIENT)
Dept: INTERNAL MEDICINE | Facility: CLINIC | Age: 27
End: 2022-05-12

## 2022-06-04 DIAGNOSIS — G47.09 OTHER INSOMNIA: ICD-10-CM

## 2022-06-06 RX ORDER — QUETIAPINE FUMARATE 50 MG/1
50 TABLET, FILM COATED ORAL NIGHTLY
Qty: 30 TABLET | Refills: 2 | Status: SHIPPED | OUTPATIENT
Start: 2022-06-06 | End: 2022-10-25 | Stop reason: SDUPTHER

## 2022-06-20 ENCOUNTER — LAB (OUTPATIENT)
Dept: LAB | Facility: HOSPITAL | Age: 27
End: 2022-06-20

## 2022-06-20 ENCOUNTER — OFFICE VISIT (OUTPATIENT)
Dept: INTERNAL MEDICINE | Facility: CLINIC | Age: 27
End: 2022-06-20

## 2022-06-20 VITALS
OXYGEN SATURATION: 96 % | HEART RATE: 72 BPM | RESPIRATION RATE: 18 BRPM | BODY MASS INDEX: 36.16 KG/M2 | SYSTOLIC BLOOD PRESSURE: 112 MMHG | HEIGHT: 72 IN | WEIGHT: 267 LBS | TEMPERATURE: 97.5 F | DIASTOLIC BLOOD PRESSURE: 78 MMHG

## 2022-06-20 DIAGNOSIS — Z11.59 ENCOUNTER FOR HEPATITIS C SCREENING TEST FOR LOW RISK PATIENT: ICD-10-CM

## 2022-06-20 DIAGNOSIS — G47.09 OTHER INSOMNIA: ICD-10-CM

## 2022-06-20 DIAGNOSIS — Z13.220 SCREENING FOR LIPID DISORDERS: ICD-10-CM

## 2022-06-20 DIAGNOSIS — Z00.00 ENCOUNTER FOR HEALTH MAINTENANCE EXAMINATION: Primary | ICD-10-CM

## 2022-06-20 DIAGNOSIS — F32.9 MAJOR DEPRESSIVE DISORDER WITH SINGLE EPISODE, REMISSION STATUS UNSPECIFIED: ICD-10-CM

## 2022-06-20 LAB
ALBUMIN SERPL-MCNC: 4.7 G/DL (ref 3.5–5.2)
ALBUMIN/GLOB SERPL: 1.6 G/DL
ALP SERPL-CCNC: 70 U/L (ref 39–117)
ALT SERPL W P-5'-P-CCNC: 20 U/L (ref 1–41)
ANION GAP SERPL CALCULATED.3IONS-SCNC: 10 MMOL/L (ref 5–15)
AST SERPL-CCNC: 20 U/L (ref 1–40)
BASOPHILS # BLD AUTO: 0.06 10*3/MM3 (ref 0–0.2)
BASOPHILS NFR BLD AUTO: 0.9 % (ref 0–1.5)
BILIRUB SERPL-MCNC: 0.4 MG/DL (ref 0–1.2)
BUN SERPL-MCNC: 11 MG/DL (ref 6–20)
BUN/CREAT SERPL: 12.5 (ref 7–25)
CALCIUM SPEC-SCNC: 9.8 MG/DL (ref 8.6–10.5)
CHLORIDE SERPL-SCNC: 100 MMOL/L (ref 98–107)
CHOLEST SERPL-MCNC: 201 MG/DL (ref 0–200)
CO2 SERPL-SCNC: 26 MMOL/L (ref 22–29)
CREAT SERPL-MCNC: 0.88 MG/DL (ref 0.76–1.27)
DEPRECATED RDW RBC AUTO: 39.4 FL (ref 37–54)
EGFRCR SERPLBLD CKD-EPI 2021: 119.4 ML/MIN/1.73
EOSINOPHIL # BLD AUTO: 0.07 10*3/MM3 (ref 0–0.4)
EOSINOPHIL NFR BLD AUTO: 1.1 % (ref 0.3–6.2)
ERYTHROCYTE [DISTWIDTH] IN BLOOD BY AUTOMATED COUNT: 12 % (ref 12.3–15.4)
GLOBULIN UR ELPH-MCNC: 3 GM/DL
GLUCOSE SERPL-MCNC: 89 MG/DL (ref 65–99)
HCT VFR BLD AUTO: 44.7 % (ref 37.5–51)
HCV AB SER DONR QL: NORMAL
HDLC SERPL-MCNC: 54 MG/DL (ref 40–60)
HGB BLD-MCNC: 15.1 G/DL (ref 13–17.7)
IMM GRANULOCYTES # BLD AUTO: 0.02 10*3/MM3 (ref 0–0.05)
IMM GRANULOCYTES NFR BLD AUTO: 0.3 % (ref 0–0.5)
LDLC SERPL CALC-MCNC: 133 MG/DL (ref 0–100)
LDLC/HDLC SERPL: 2.44 {RATIO}
LYMPHOCYTES # BLD AUTO: 1.55 10*3/MM3 (ref 0.7–3.1)
LYMPHOCYTES NFR BLD AUTO: 24 % (ref 19.6–45.3)
MCH RBC QN AUTO: 30.8 PG (ref 26.6–33)
MCHC RBC AUTO-ENTMCNC: 33.8 G/DL (ref 31.5–35.7)
MCV RBC AUTO: 91.2 FL (ref 79–97)
MONOCYTES # BLD AUTO: 0.58 10*3/MM3 (ref 0.1–0.9)
MONOCYTES NFR BLD AUTO: 9 % (ref 5–12)
NEUTROPHILS NFR BLD AUTO: 4.18 10*3/MM3 (ref 1.7–7)
NEUTROPHILS NFR BLD AUTO: 64.7 % (ref 42.7–76)
NRBC BLD AUTO-RTO: 0 /100 WBC (ref 0–0.2)
PLATELET # BLD AUTO: 256 10*3/MM3 (ref 140–450)
PMV BLD AUTO: 9.3 FL (ref 6–12)
POTASSIUM SERPL-SCNC: 4.3 MMOL/L (ref 3.5–5.2)
PROT SERPL-MCNC: 7.7 G/DL (ref 6–8.5)
RBC # BLD AUTO: 4.9 10*6/MM3 (ref 4.14–5.8)
SODIUM SERPL-SCNC: 136 MMOL/L (ref 136–145)
TRIGL SERPL-MCNC: 77 MG/DL (ref 0–150)
VLDLC SERPL-MCNC: 14 MG/DL (ref 5–40)
WBC NRBC COR # BLD: 6.46 10*3/MM3 (ref 3.4–10.8)

## 2022-06-20 PROCEDURE — 99395 PREV VISIT EST AGE 18-39: CPT | Performed by: PHYSICIAN ASSISTANT

## 2022-06-20 PROCEDURE — 86803 HEPATITIS C AB TEST: CPT | Performed by: PHYSICIAN ASSISTANT

## 2022-06-20 PROCEDURE — 80053 COMPREHEN METABOLIC PANEL: CPT | Performed by: PHYSICIAN ASSISTANT

## 2022-06-20 PROCEDURE — 85025 COMPLETE CBC W/AUTO DIFF WBC: CPT | Performed by: PHYSICIAN ASSISTANT

## 2022-06-20 PROCEDURE — 80061 LIPID PANEL: CPT | Performed by: PHYSICIAN ASSISTANT

## 2022-06-20 NOTE — PROGRESS NOTES
"Chief Complaint   Patient presents with   • Annual Exam     fasting       Subjective       History of Present Illness     Estuardo Crooks is a 27 y.o. male. He presents for an annual exam.    He is taking Seroquel for sleep which has helped some.  He reports that he tries to take Seroquel by 9 PM at the latest. It makes him groggy if he takes it late. He usually goes to sleep around 10:30 PM to 11 PM and gets up at 5:45 AM. He reports that he gets 6 hours of sleep when he takes Seroquel. He states it is working well and he likes it better than the Ambien. He reports his energy level is getting better recently that he is getting some more sleep.     He reports that his anxiety and his mood have been getting \"a lot better\" when compared to a couple of months ago. It is more manageable. He is taking Zoloft. He states it is still working and he tolerates it well.     The patient reports that he has not seen a counselor for almost 2 months and he has the next appointment next week. He states that his counselor is \"great\", and \"easy to talk to\", but the counseling session is too short.     Are you currently seeing any other doctors or specialists? No   Are you currently taking any OTC medications or herbal medications? No     Sleep: 6 hours/ night  Diet: fairly healthy, per pt  Exercise: no regular exercise routine     Most recent colonoscopy: n/a  Regular dental visits: No, not UTD  Regular eye exams: Yes, UTD         The following portions of the patient's history were reviewed and updated as appropriate: allergies, current medications, past medical history, past social history and problem list.    No Known Allergies  Social History     Tobacco Use   • Smoking status: Former Smoker     Packs/day: 0.50     Years: 0.00     Pack years: 0.00     Types: Cigarettes     Quit date: 2021     Years since quittin.4   • Smokeless tobacco: Former User   Substance Use Topics   • Alcohol use: Yes     Comment: occ.     Past " Surgical History:   Procedure Laterality Date   • ROOT CANAL  02/2020     Family History   Problem Relation Age of Onset   • Arthritis Father    • Colon polyps Father    • Heart attack Maternal Grandmother    • COPD Maternal Grandmother    • Diabetes Maternal Grandfather          Current Outpatient Medications:   •  QUEtiapine (SEROquel) 50 MG tablet, TAKE 1 TABLET BY MOUTH EVERY NIGHT, Disp: 30 tablet, Rfl: 2  •  sertraline (Zoloft) 100 MG tablet, Take 1 tablet by mouth Daily., Disp: 30 tablet, Rfl: 5    Patient Active Problem List   Diagnosis   • Major depressive disorder with single episode   • Other insomnia       Review of Systems   Constitutional: Negative for chills, fatigue and fever.   HENT: Negative for congestion, ear pain, sore throat and trouble swallowing.    Eyes: Negative for pain.   Respiratory: Negative for cough, shortness of breath and wheezing.    Cardiovascular: Negative for chest pain and palpitations.   Gastrointestinal: Negative for abdominal pain, blood in stool, diarrhea, nausea and vomiting.   Genitourinary: Negative for dysuria, hematuria and testicular pain.   Musculoskeletal: Negative for back pain.   Skin: Negative for rash.   Allergic/Immunologic: Negative for immunocompromised state.   Neurological: Negative for dizziness, syncope, weakness and headache.   Psychiatric/Behavioral: Positive for sleep disturbance (well-controlled) and depressed mood (improved). The patient is nervous/anxious (improved).        Objective   Vitals:    06/20/22 0922   BP: 112/78   Pulse: 72   Resp: 18   Temp: 97.5 °F (36.4 °C)   SpO2: 96%     Body mass index is 36.72 kg/m².    Physical Exam  Vitals reviewed.   Constitutional:       Appearance: He is well-developed.   HENT:      Head: Normocephalic and atraumatic.      Right Ear: Tympanic membrane, ear canal and external ear normal. No tenderness.      Left Ear: Tympanic membrane, ear canal and external ear normal. No tenderness.      Nose: Nose normal. No  congestion.      Mouth/Throat:      Mouth: Mucous membranes are moist. No oral lesions.      Pharynx: Oropharynx is clear.   Eyes:      General: No scleral icterus.     Conjunctiva/sclera: Conjunctivae normal.   Neck:      Thyroid: No thyromegaly.      Vascular: No carotid bruit.   Cardiovascular:      Rate and Rhythm: Normal rate and regular rhythm.      Pulses: Normal pulses.           Radial pulses are 2+ on the right side and 2+ on the left side.      Heart sounds: Normal heart sounds. No murmur heard.    No gallop.   Pulmonary:      Effort: Pulmonary effort is normal.      Breath sounds: Normal breath sounds. No wheezing or rales.   Chest:      Chest wall: No deformity.   Abdominal:      General: Bowel sounds are normal.      Palpations: Abdomen is soft. There is no mass.      Tenderness: There is no abdominal tenderness. Negative signs include Saldana's sign.   Musculoskeletal:         General: No tenderness or deformity. Normal range of motion.      Cervical back: Neck supple.   Lymphadenopathy:      Cervical: No cervical adenopathy.   Skin:     General: Skin is warm and dry.      Findings: No erythema or rash.      Comments: No atypical nevi.    Neurological:      Mental Status: He is alert and oriented to person, place, and time.   Psychiatric:         Behavior: Behavior normal.               Assessment & Plan   Diagnoses and all orders for this visit:    1. Encounter for health maintenance examination (Primary)  -     CBC & Differential; Future  -     Comprehensive Metabolic Panel; Future  -     Hepatitis C Antibody; Future  -     Lipid Panel; Future  -     CBC & Differential  -     Comprehensive Metabolic Panel  -     Hepatitis C Antibody  -     Lipid Panel  -  Advised to do some exercises 20 to 30 minutes a day.   - Discussed how to assess an abnormal skin lesion.   - A routine blood work is ordered.     2. Major depressive disorder with single episode, remission status unspecified        - Advised to  continue on a counseling if it is helpful.         - Continue on Seroquel and Zoloft.     3. Other insomnia  - Continue Seroquel.     4. Screening for lipid disorders  -     Lipid Panel; Future  -     Lipid Panel    5. Encounter for hepatitis C screening test for low risk patient  -     Hepatitis C Antibody; Future  -     Hepatitis C Antibody               Patient education discussed during this visit:  - avoidance of texting while driving and the need for wearing seatbelt  - use of sunscreen  - healthy sleep habits and appropriate amount of sleep  - H2O consumption, well-balanced diet  - exercise routine which includes at least 150 minutes of cardio per week + muscle strengthening exercises  - immunizations including annual flu vaccination      Return in about 6 months (around 12/20/2022).     Transcribed from ambient dictation for Lilia Dueñas PA-C by ROOSEVELT HORVATH.  06/20/22   11:45 EDT    Patient verbalized consent to the visit recording.

## 2022-07-18 DIAGNOSIS — G47.09 OTHER INSOMNIA: ICD-10-CM

## 2022-07-22 RX ORDER — ZOLPIDEM TARTRATE 5 MG/1
5 TABLET ORAL NIGHTLY PRN
Qty: 30 TABLET | OUTPATIENT
Start: 2022-07-22

## 2022-07-22 NOTE — TELEPHONE ENCOUNTER
Discontinued previously, replaced by Seroquel. Defer refill unless patient calls to discuss with PCP.

## 2022-10-04 ENCOUNTER — TELEPHONE (OUTPATIENT)
Dept: INTERNAL MEDICINE | Facility: CLINIC | Age: 27
End: 2022-10-04

## 2022-10-04 DIAGNOSIS — F43.21 GRIEF: ICD-10-CM

## 2022-10-04 DIAGNOSIS — F41.9 ANXIETY: ICD-10-CM

## 2022-10-04 RX ORDER — SERTRALINE HYDROCHLORIDE 100 MG/1
100 TABLET, FILM COATED ORAL DAILY
Qty: 30 TABLET | Refills: 5 | Status: SHIPPED | OUTPATIENT
Start: 2022-10-04

## 2022-10-25 ENCOUNTER — TELEPHONE (OUTPATIENT)
Dept: INTERNAL MEDICINE | Facility: CLINIC | Age: 27
End: 2022-10-25

## 2022-10-25 DIAGNOSIS — G47.09 OTHER INSOMNIA: ICD-10-CM

## 2022-10-25 RX ORDER — QUETIAPINE FUMARATE 50 MG/1
50 TABLET, FILM COATED ORAL NIGHTLY
Qty: 30 TABLET | Refills: 1 | Status: SHIPPED | OUTPATIENT
Start: 2022-10-25 | End: 2022-11-28

## 2022-10-25 NOTE — TELEPHONE ENCOUNTER
PATIENT CALLED REQUESTING A REFILL ON QUETIAPINE. PRESCRIPTION LAST REFILLED BY PETE ROONEY. PATIENT HAS A NEW PATIENT APPT on 12/16

## 2022-11-17 ENCOUNTER — TELEMEDICINE (OUTPATIENT)
Dept: FAMILY MEDICINE CLINIC | Facility: TELEHEALTH | Age: 27
End: 2022-11-17

## 2022-11-17 DIAGNOSIS — J11.1 INFLUENZA: Primary | ICD-10-CM

## 2022-11-17 PROCEDURE — 99213 OFFICE O/P EST LOW 20 MIN: CPT | Performed by: NURSE PRACTITIONER

## 2022-11-17 RX ORDER — OSELTAMIVIR PHOSPHATE 75 MG/1
75 CAPSULE ORAL 2 TIMES DAILY
Qty: 10 CAPSULE | Refills: 0 | Status: SHIPPED | OUTPATIENT
Start: 2022-11-17 | End: 2022-11-22

## 2022-11-17 NOTE — PATIENT INSTRUCTIONS
"Influenza, Adult  Influenza, also called \"the flu,\" is a viral infection that mainly affects the respiratory tract. This includes the lungs, nose, and throat. The flu spreads easily from person to person (is contagious). It causes common cold symptoms, along with high fever and body aches.  What are the causes?  This condition is caused by the influenza virus. You can get the virus by:  Breathing in droplets that are in the air from an infected person's cough or sneeze.  Touching something that has the virus on it (has been contaminated) and then touching your mouth, nose, or eyes.  What increases the risk?  The following factors may make you more likely to get the flu:  Not washing or sanitizing your hands often.  Having close contact with many people during cold and flu season.  Touching your mouth, eyes, or nose without first washing or sanitizing your hands.  Not getting an annual flu shot.  You may have a higher risk for the flu, including serious problems, such as a lung infection (pneumonia), if you:  Are older than 65.  Are pregnant.  Have a weakened disease-fighting system (immune system). This includes people who have HIV or AIDS, are on chemotherapy, or are taking medicines that reduce (suppress) the immune system.  Have a long-term (chronic) illness, such as heart disease, kidney disease, diabetes, or lung disease.  Have a liver disorder.  Are severely overweight (morbidly obese).  Have anemia.  Have asthma.  What are the signs or symptoms?  Symptoms of this condition usually begin suddenly and last 4-14 days. These may include:  Fever and chills.  Headaches, body aches, or muscle aches.  Sore throat.  Cough.  Runny or stuffy (congested) nose.  Chest discomfort.  Poor appetite.  Weakness or fatigue.  Dizziness.  Nausea or vomiting.  How is this diagnosed?  This condition may be diagnosed based on:  Your symptoms and medical history.  A physical exam.  Swabbing your nose or throat and testing the fluid " for the influenza virus.  How is this treated?  If the flu is diagnosed early, you can be treated with antiviral medicine that is given by mouth (orally) or through an IV. This can help reduce how severe the illness is and how long it lasts.  Taking care of yourself at home can help relieve symptoms. Your health care provider may recommend:  Taking over-the-counter medicines.  Drinking plenty of fluids.  In many cases, the flu goes away on its own. If you have severe symptoms or complications, you may be treated in a hospital.  Follow these instructions at home:  Activity  Rest as needed and get plenty of sleep.  Stay home from work or school as told by your health care provider. Unless you are visiting your health care provider, avoid leaving home until your fever has been gone for 24 hours without taking medicine.  Eating and drinking  Take an oral rehydration solution (ORS). This is a drink that is sold at pharmacies and retail stores.  Drink enough fluid to keep your urine pale yellow.  Drink clear fluids in small amounts as you are able. Clear fluids include water, ice chips, fruit juice mixed with water, and low-calorie sports drinks.  Eat bland, easy-to-digest foods in small amounts as you are able. These foods include bananas, applesauce, rice, lean meats, toast, and crackers.  Avoid drinking fluids that contain a lot of sugar or caffeine, such as energy drinks, regular sports drinks, and soda.  Avoid alcohol.  Avoid spicy or fatty foods.  General instructions     Take over-the-counter and prescription medicines only as told by your health care provider.  Use a cool mist humidifier to add humidity to the air in your home. This can make it easier to breathe.  When using a cool mist humidifier, clean it daily. Empty the water and replace it with clean water.  Cover your mouth and nose when you cough or sneeze.  Wash your hands with soap and water often and for at least 20 seconds, especially after you cough or  "sneeze. If soap and water are not available, use alcohol-based hand .  Keep all follow-up visits. This is important.  How is this prevented?    Get an annual flu shot. This is usually available in late summer, fall, or winter. Ask your health care provider when you should get your flu shot.  Avoid contact with people who are sick during cold and flu season. This is generally fall and winter.  Contact a health care provider if:  You develop new symptoms.  You have:  Chest pain.  Diarrhea.  A fever.  Your cough gets worse.  You produce more mucus.  You feel nauseous or you vomit.  Get help right away if you:  Develop shortness of breath or have difficulty breathing.  Have skin or nails that turn a bluish color.  Have severe pain or stiffness in your neck.  Develop a sudden headache or sudden pain in your face or ear.  Cannot eat or drink without vomiting.  These symptoms may represent a serious problem that is an emergency. Do not wait to see if the symptoms will go away. Get medical help right away. Call your local emergency services (911 in the [place] Do not drive yourself to the hospital.  Summary  Influenza, also called \"the flu,\" is a viral infection that primarily affects your respiratory tract.  Symptoms of the flu usually begin suddenly and last 4-14 days.  Getting an annual flu shot is the best way to prevent getting the flu.  Stay home from work or school as told by your health care provider. Unless you are visiting your health care provider, avoid leaving home until your fever has been gone for 24 hours without taking medicine.  Keep all follow-up visits. This is important.  This information is not intended to replace advice given to you by your health care provider. Make sure you discuss any questions you have with your health care provider.  Document Revised: 08/06/2021 Document Reviewed: 08/06/2021  Elsevier Patient Education © 2022 [employer]    "

## 2022-11-17 NOTE — PROGRESS NOTES
You have chosen to receive care through a telehealth visit.  Do you consent to use a video/audio connection for your medical care today? Yes     CHIEF COMPLAINT  No chief complaint on file.        HPI  Estuardo Crooks is a 27 y.o. male  presents with complaint of sudden onset of weakness, fatigue, body aches, diarrhea, fever .  Denies sore throat, cough, congestion, ear pain, wheezing, or shortness of breath    At home COVID test was negative    Review of Systems   See HPI    Past Medical History:   Diagnosis Date   • Anxiety    • Depression    • GERD (gastroesophageal reflux disease)    • Obesity        Family History   Problem Relation Age of Onset   • Arthritis Father    • Colon polyps Father    • Heart attack Maternal Grandmother    • COPD Maternal Grandmother    • Diabetes Maternal Grandfather        Social History     Socioeconomic History   • Marital status: Significant Other   Tobacco Use   • Smoking status: Former     Packs/day: 0.50     Years: 0.00     Pack years: 0.00     Types: Cigarettes     Quit date: 2021     Years since quittin.8   • Smokeless tobacco: Former   Vaping Use   • Vaping Use: Some days   • Substances: Nicotine   • Devices: Pre-filled or refillable cartridge, Refillable tank   • Passive vaping exposure: Yes   Substance and Sexual Activity   • Alcohol use: Yes     Comment: occ.   • Drug use: No   • Sexual activity: Yes     Partners: Female       Estuardo Crooks  reports that he quit smoking about 21 months ago. His smoking use included cigarettes. He smoked an average of 0.50 packs per day. He has quit using smokeless tobacco..              There were no vitals taken for this visit.    PHYSICAL EXAM  Physical Exam   Constitutional: He is oriented to person, place, and time. He appears well-developed and well-nourished. He does not have a sickly appearance. He appears ill.   HENT:   Head: Normocephalic and atraumatic.   Pulmonary/Chest: Effort normal.  No respiratory  distress.  Neurological: He is alert and oriented to person, place, and time.         Diagnoses and all orders for this visit:    1. Influenza (Primary)  -     oseltamivir (Tamiflu) 75 MG capsule; Take 1 capsule by mouth 2 (Two) Times a Day for 5 days.  Dispense: 10 capsule; Refill: 0    --take Tamiflu as prescribed  --increase fluids, rest, tylenol or ibuprofen for pain/fever  --f/u in 3-5 days if no improvement      FOLLOW-UP  As discussed during visit with PCP/[facility] if no improvement or Urgent Care/Emergency Department if worsening of symptoms    Patient verbalizes understanding of medication dosage, comfort measures, instructions for treatment and follow-up.    Aminata Royal, APRN  11/17/2022  09:42 EST    The use of a video visit has been reviewed with the patient and verbal informed consent has been obtained. Myself and Estuardo Crooks participated in this visit. The patient is located in [address] [place] [address].    I am located in [place], [place]. Mychart and Zoom were utilized. I spent 8 minutes in the patient's chart for this visit.

## 2022-11-24 DIAGNOSIS — G47.09 OTHER INSOMNIA: ICD-10-CM

## 2022-11-28 RX ORDER — QUETIAPINE FUMARATE 50 MG/1
50 TABLET, FILM COATED ORAL NIGHTLY
Qty: 30 TABLET | Refills: 1 | Status: SHIPPED | OUTPATIENT
Start: 2022-11-28 | End: 2022-12-16 | Stop reason: SDUPTHER

## 2022-12-16 ENCOUNTER — OFFICE VISIT (OUTPATIENT)
Dept: INTERNAL MEDICINE | Facility: CLINIC | Age: 27
End: 2022-12-16

## 2022-12-16 ENCOUNTER — HOSPITAL ENCOUNTER (OUTPATIENT)
Dept: GENERAL RADIOLOGY | Facility: HOSPITAL | Age: 27
Discharge: HOME | End: 2022-12-16
Admitting: NURSE PRACTITIONER

## 2022-12-16 VITALS
RESPIRATION RATE: 18 BRPM | TEMPERATURE: 97.8 F | HEART RATE: 93 BPM | DIASTOLIC BLOOD PRESSURE: 78 MMHG | HEIGHT: 72 IN | BODY MASS INDEX: 38.19 KG/M2 | WEIGHT: 282 LBS | OXYGEN SATURATION: 98 % | SYSTOLIC BLOOD PRESSURE: 126 MMHG

## 2022-12-16 DIAGNOSIS — E66.01 CLASS 2 SEVERE OBESITY DUE TO EXCESS CALORIES WITH SERIOUS COMORBIDITY AND BODY MASS INDEX (BMI) OF 38.0 TO 38.9 IN ADULT: Primary | ICD-10-CM

## 2022-12-16 DIAGNOSIS — M25.512 ACUTE PAIN OF LEFT SHOULDER: ICD-10-CM

## 2022-12-16 DIAGNOSIS — G47.09 OTHER INSOMNIA: ICD-10-CM

## 2022-12-16 PROCEDURE — 99214 OFFICE O/P EST MOD 30 MIN: CPT | Performed by: NURSE PRACTITIONER

## 2022-12-16 PROCEDURE — 73030 X-RAY EXAM OF SHOULDER: CPT

## 2022-12-16 RX ORDER — METHYLPREDNISOLONE 4 MG/1
TABLET ORAL
Qty: 21 TABLET | Refills: 0 | Status: SHIPPED | OUTPATIENT
Start: 2022-12-16 | End: 2023-01-08

## 2022-12-16 RX ORDER — QUETIAPINE FUMARATE 100 MG/1
100 TABLET, FILM COATED ORAL NIGHTLY
Qty: 90 TABLET | Refills: 1 | Status: SHIPPED | OUTPATIENT
Start: 2022-12-16

## 2022-12-16 NOTE — PROGRESS NOTES
Chief Complaint  Establish Care, Depression (6mo fu, Depression), and Shoulder Pain (Lateral LT shoulder pain)    Subjective          Estuardo Crooks presents to [facility]  The patient presents today for evaluation of depression and left shoulder pain.    The patient was last seen in 06/2022. He is taking Seroquel for sleep, which has helped him more than Ambien. He is having improvement with his anxiety on Zoloft. He has a counselor, but has not seen him for a couple of months. The patient has a medical history of heart burn as well.      He reports that he has been experiencing left shoulder pain for approximately 6 months. He denies any injury. He states that he had a shoulder injury on his right shoulder approximately 10 years ago, which healed up in the last couple of years. The patient reports that his left shoulder has slowly started to ache intermittently. He states that if he has to do anything over his head with his arms, it is pretty painful. The patient reports that he has weakness in his left arm, but it is more from pain. He states that he has the strength, but it just hurts to use it. The patient reports that he has not done any kind of treatment to it. He states that he has taken prednisone in the past.[    The patient reports that his depression started in the beginning of 2021. He states that he was  and got  from his wife in 2021. The patient reports that his father passed away on 12/10/2021 in a plane crash in. 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 There is also some legal action going on that he has to deal with.      The patient reports that he was set up with therapy through video a few times. He states that he did not feel like they were going anywhere after 4 to 5 sessions. The patient reports that the sessions were only 30 minutes and he felt like for 25 minutes,  he was reminding her of the whole situation and what all would happen. He states that the next 5 minutes, she basically would tell him the same thing that he needed to take a break. The patient reports that he would love to take a break, but he can not. He states that he is still taking the medications. The patient reports that he feels like it is a little bit better. He states that considering this last year, he feels like it is better. The patient reports that his anxiety had gotten really bad at one point, but he thinks that might have been when he went to the 100 mg of Seroquel. He states that he feels like he is doing fine.    The patient reports that he has tried trazodone in the past. He states that it worked great for a while. The patient reports that he has tried amitriptyline in the past. The patient reports that he was started on trazodone. He states that after that, he went to Ambien and the Ambien worked great for almost 2 months. The patient reports that he could take it and stay up all night. He states that he was not crazy about being on a controlled substance. The patient reports that he would like to try increasing his Seroquel to 100 mg. He states that the Ambien was great, but within 30 minutes to 1 hour he was a zombie. The patient reports that he would wake up the next morning and feel great.    The patient reports that he has had his COVID-19 vaccine. He states that he does not want the booster at this time.    He has a surgical history of a root canal.    The patient has a family history of his father with arthritis and colon polyps, maternal grandmother with COPD and a myocardial infarction which she had in her 70's. His maternal grandfather had diabetes.    The patient is a former smoker with a 2 and a half year pack history. He states that he started smoking in 2010 and quit in 2015 or 2016. He denies any illicit drug use. He states that he vapes occasionally. He states that he drinks beer  "occasionally. He states that he drinks about 5 beers a week.    Current Outpatient Medications:   •  QUEtiapine (SEROquel) 100 MG tablet, Take 1 tablet by mouth Every Night., Disp: 90 tablet, Rfl: 1  •  sertraline (Zoloft) 100 MG tablet, Take 1 tablet by mouth Daily., Disp: 30 tablet, Rfl: 5  •  methylPREDNISolone (MEDROL) 4 MG dose pack, Take as directed on package instructions., Disp: 21 tablet, Rfl: 0     Review of systems:  No new chest pain, shortness of breath, headaches, visual changes, dizziness, palpitations, syncope, swelling in the lower extremities, or shortness of breath when laying down. No abdominal pain, constipation, hematuria or hematochezia, or dysuria. No new lumps, bumps, or rashes.     Objective   Vital Signs:   /78   Pulse 93   Temp 97.8 °F (36.6 °C) (Infrared)   Resp 18   Ht 181.6 cm (71.5\")   Wt 128 kg (282 lb)   SpO2 98%   BMI 38.78 kg/m²     Physical Exam  Vitals and nursing note reviewed.   Constitutional:       General: He is not in acute distress.     Appearance: Normal appearance. He is well-developed. He is not ill-appearing.   HENT:      Head: Normocephalic and atraumatic.   Eyes:      General: No scleral icterus.  Neck:      Thyroid: No thyromegaly.   Cardiovascular:      Rate and Rhythm: Normal rate and regular rhythm.   Pulmonary:      Effort: Pulmonary effort is normal.      Breath sounds: Normal breath sounds.   Abdominal:      General: Bowel sounds are normal. There is no distension.      Palpations: Abdomen is soft.      Tenderness: There is no abdominal tenderness.   Musculoskeletal:      Left upper arm: Tenderness present.      Comments: Left shoulder : Positive empty can test with active and passive range of motion. Good reflexes in his bilateral upper extremities. Good strength in his upper extremities. DTRs and pulses are 2+. Internal and external rotation produces pain. Pain is in the left trapezius with empty can test. He was tender in the left anterior " shoulder and he is palpable. He is tender in the same place. He is borderline in the left trapezius. He notices when he brings his arm up that he has pain in the top of the left shoulder and in the left axilla.   Lymphadenopathy:      Cervical: No cervical adenopathy.   Skin:     Capillary Refill: Capillary refill takes 2 to 3 seconds.      Coloration: Skin is not pale.   Neurological:      Mental Status: He is alert and oriented to person, place, and time.      Deep Tendon Reflexes:      Reflex Scores:       Tricep reflexes are 2+ on the right side and 2+ on the left side.       Bicep reflexes are 2+ on the right side and 2+ on the left side.       Brachioradialis reflexes are 2+ on the right side and 2+ on the left side.       Patellar reflexes are 2+ on the right side and 2+ on the left side.       Achilles reflexes are 2+ on the right side and 2+ on the left side.  Psychiatric:         Mood and Affect: Mood normal.         Behavior: Behavior normal.        Result Review :                 Assessment and Plan    Diagnoses and all orders for this visit:    1. Class 2 severe obesity due to excess calories with serious comorbidity and body mass index (BMI) of 38.0 to 38.9 in adult (HCC) (Primary)    2. Acute pain of left shoulder  -     XR Shoulder 2+ View Left; Future  -     Ambulatory Referral to Physical Therapy  -     methylPREDNISolone (MEDROL) 4 MG dose pack; Take as directed on package instructions.  Dispense: 21 tablet; Refill: 0    3. Other insomnia  -     QUEtiapine (SEROquel) 100 MG tablet; Take 1 tablet by mouth Every Night.  Dispense: 90 tablet; Refill: 1    Left shoulder pain  -  will obtain an x-ray of the left shoulder.  -  will refer the patient to physical therapy.     Depression  -  will increase the patient's Seroquel to 100 mg daily.    Hyperlipidemia  -  will obtain a lipid panel.    Living will information given to the patient.        Class 2 Severe Obesity (BMI >=35 and <=39.9).  Obesity-related health conditions include the following: GERD. Obesity is worsening. BMI is is above average; BMI management plan is completed. We discussed portion control and increasing exercise.       AWV: na  A1C: No results found for: HGBA1C   ACP: Advance Care Planning   ACP discussion was held with the patient during this visit. Patient does not have an advance directive, information provided.   Mammogram: na  Colonoscopy: na    Follow Up   Return in about 6 months (around 6/16/2023) for fasting, Annual.  Patient was given instructions and counseling regarding his condition or for health maintenance advice. Please see specific information pulled into the AVS if appropriate.     RTC/call  If symptoms worsen  Meds MOA and SE's reviewed and pt v/u    SHAQ Hirsch  [facility]  [address] [place] [id number]  Fax [phone]  Phone [phone]    Transcribed from ambient dictation for SHAQ Hirsch by Adeline Huang.  12/16/22   17:21 EST    Patient or patient representative verbalized consent to the visit recording.  I have personally performed the services described in this document as transcribed by the above individual, and it is both accurate and complete.

## 2022-12-16 NOTE — PATIENT INSTRUCTIONS
Advance Care Planning and Advance Directives     You make decisions on a daily basis - decisions about where you want to live, your career, your home, your life. Perhaps one of the most important decisions you face is your choice for future medical care. Take time to talk with your family and your healthcare team and start planning today.  Advance Care Planning is a process that can help you:  Understand possible future healthcare decisions in light of your own experiences  Reflect on those decision in light of your goals and values  Discuss your decisions with those closest to you and the healthcare professionals that care for you  Make a plan by creating a document that reflects your wishes    Surrogate Decision Maker  In the event of a medical emergency, which has left you unable to communicate or to make your own decisions, you would need someone to make decisions for you.  It is important to discuss your preferences for medical treatment with this person while you are in good health.     Qualities of a surrogate decision maker:  Willing to take on this role and responsibility  Knows what you want for future medical care  Willing to follow your wishes even if they don't agree with them  Able to make difficult medical decisions under stressful circumstances    Advance Directives  These are legal documents you can create that will guide your healthcare team and decision maker(s) when needed. These documents can be stored in the electronic medical record.    Living Will - a legal document to guide your care if you have a terminal condition or a serious illness and are unable to communicate. States vary by statute in document names/types, but most forms may include one or more of the following:        -  Directions regarding life-prolonging treatments        -  Directions regarding artificially provided nutrition/hydration        -  Choosing a healthcare decision maker        -  Direction regarding organ/tissue  donation    Durable Power of  for Healthcare - this document names an -in-fact to make medical decisions for you, but it may also allow this person to make personal and financial decisions for you. Please seek the advice of an  if you need this type of document.    **Advance Directives are not required and no one may discriminate against you if you do not sign one.    Medical Orders  Many states allow specific forms/orders signed by your physician to record your wishes for medical treatment in your current state of health. This form, signed in personal communication with your physician, addresses resuscitation and other medical interventions that you may or may not want.      For more information or to schedule a time with a [facility] Advance Care Planning Facilitator contact: [facility] or call [phone] and someone will contact you directly.MyPlate from USDA  MyPlate is an outline of a general healthy diet based on the Dietary Guidelines for Americans, 6156-8022, from the U.S. Department of Agriculture (USDA). It sets guidelines for how much food you should eat from each food group based on your age, sex, and level of physical activity.  What are tips for following MyPlate?  To follow MyPlate recommendations:  Eat a wide variety of fruits and vegetables, grains, and protein foods.  Serve smaller portions and eat less food throughout the day.  Limit portion sizes to avoid overeating.  Enjoy your food.  Get at least 150 minutes of exercise every week. This is about 30 minutes each day, 5 or more days per week.  It can be difficult to have every meal look like MyPlate. Think about MyPlate as eating guidelines for an entire day, rather than each individual meal.  Fruits and vegetables  Make one half of your plate fruits and vegetables.  Eat many different colors of fruits and vegetables each day.  For a 2,000-calorie daily food plan, eat:  2½ cups of vegetables every day.  2  cups of fruit every day.  1 cup is equal to:  1 cup raw or cooked vegetables.  1 cup raw fruit.  1 medium-sized orange, apple, or banana.  1 cup 100% fruit or vegetable juice.  2 cups raw leafy greens, such as lettuce, spinach, or kale.  ½ cup dried fruit.  Grains  One fourth of your plate should be grains.  Make at least half of the grains you eat each day whole grains.  For a 2,000-calorie daily food plan, eat 6 oz of grains every day.  1 oz is equal to:  1 slice bread.  1 cup cereal.  ½ cup cooked rice, cereal, or pasta.  Protein  One fourth of your plate should be protein.  Eat a wide variety of protein foods, including meat, poultry, fish, eggs, beans, nuts, and tofu.  For a 2,000-calorie daily food plan, eat 5½ oz of protein every day.  1 oz is equal to:  1 oz meat, poultry, or fish.  ¼ cup cooked beans.  1 egg.  ½ oz nuts or seeds.  1 Tbsp peanut butter.  Dairy  Drink fat-free or low-fat (1%) milk.  Eat or drink dairy as a side to meals.  For a 2,000-calorie daily food plan, eat or drink 3 cups of dairy every day.  1 cup is equal to:  1 cup milk, yogurt, cottage cheese, or soy milk (soy beverage).  2 oz processed cheese.  1½ oz natural cheese.  Fats, oils, salt, and sugars  Only small amounts of oils are recommended.  Avoid foods that are high in calories and low in nutritional value (empty calories), like foods high in fat or added sugars.  Choose foods that are low in salt (sodium). Choose foods that have less than 140 milligrams (mg) of sodium per serving.  Drink water instead of sugary drinks. Drink enough fluid to keep your urine pale yellow.  Where to find support  Work with your health care provider or a dietitian to develop a customized eating plan that is right for you.  Download an rainer (mobile application) to help you track your daily food intake.  Where to find more information  USDA: ChooseMyPlate.gov  Summary  MyPlate is a general guideline for healthy eating from the USDA. It is based on the  Dietary Guidelines for Americans, 5181-5037.  In general, fruits and vegetables should take up one half of your plate, grains should take up one fourth of your plate, and protein should take up one fourth of your plate.  This information is not intended to replace advice given to you by your health care provider. Make sure you discuss any questions you have with your health care provider.  Document Revised: 11/08/2021 Document Reviewed: 11/08/2021  Elsevier Patient Education © 2022 [employer].

## 2022-12-20 ENCOUNTER — TELEPHONE (OUTPATIENT)
Dept: INTERNAL MEDICINE | Facility: CLINIC | Age: 27
End: 2022-12-20

## 2022-12-20 NOTE — TELEPHONE ENCOUNTER
Caller: Estuardo Crooks    Relationship: Self    Best call back number: [phone]      What was the call regarding: PATIENT CALLED TO STATE HE IS EXPERIENCING  DRAINAGE, SORE THROAT, ITCHY EYES, REQUESTING AN ANTIBIOTIC. PATIENT LEFT A MESSAGE ON HIS MYCHART FOR M[facility].     Do you require a callback: YES

## 2022-12-21 ENCOUNTER — E-VISIT (OUTPATIENT)
Dept: FAMILY MEDICINE CLINIC | Facility: TELEHEALTH | Age: 27
End: 2022-12-21
Payer: COMMERCIAL

## 2022-12-21 PROCEDURE — BRIGHTMDVISIT: Performed by: NURSE PRACTITIONER

## 2022-12-21 NOTE — E-VISIT TREATED
Chief Complaint: Coronavirus (COVID-19), cold, sinus pain, allergy, or flu   Patient introduction   Patient is 27-year-old male with cough, congestion, itchy or watery eyes, sore throat, and fatigue that started 6 to 9 days ago.   COVID-19 exposure, testing history, and vaccination status:    No known exposure to a person with a confirmed or suspected case of COVID-19.    No recent travel outside of their local community.    No history of COVID-19 testing since symptom onset.    Received 2 doses of the COVID-19 vaccine (Pfizer, Pfizer).   Received their most recent dose of the vaccine more than 14 days ago.   Risk factors for severe disease from COVID-19 infection:    BMI >= 25.    Does not smoke tobacco; smoked previously.   Patient-submitted comments: My eyes are red and itchy, getting green crumbs often. Nose is stopped up with thick green mucus that I am coughing up as well. Very sore throat..   Patient did not request an excuse note.   General presentation   Patient saw improvement in symptoms for 1 to 2 days, followed by worsening of symptoms. Symptoms came on gradually.   Fever:    No fever.   Sinus and nasal symptoms:    Green nasal drainage.    Nasal drainage is thick.    Postnasal drip.    1 to 3 episodes of antibiotic treatment for sinus infection in the last year.    Congestion with sinus pain or pressure on or around the nose.    Patient first noticed sinus pain 5 to 9 days ago.    Sinus pain is not worse with Valsalva.    No nasal discharge.    No itchy nose or sneezing.    No history of unhealed nasal septal ulcer/nasal wound.    No history of deviated septum or nasal polyps.   Throat symptoms:    Sore throat.    No known recent strep exposure.    Patient thinks they have strep.    Has discomfort when swallowing but can swallow liquids and solid foods.   Head and body aches:    Fatigue.    No headache.    No sweats.    No chills.    No myalgia.   Cough:    Cough is worse at night/while sleeping.     Cough is mildly productive of sputum.    Describes color of sputum as green.   Wheezing and shortness of breath:    No COPD diagnosis.    No asthma diagnosis.    No wheezing.    No shortness of breath.    No previous albuterol inhaler use for URIs, bronchitis, or pneumonia.    No previous steroid inhaler use for URIs, bronchitis, or pneumonia.   Chest pain:    No chest pain.   Ear symptoms:    None.   Dizziness:    No dizziness.   Allergies:    No history of allergies.   Flu exposure:    No recent known exposure to a person with a confirmed flu diagnosis.    Has not had a flu vaccine this season.   Not taking any over-the-counter medications for current symptoms.   Review of red flags/alarm symptoms:    No changes in alertness or awareness.    No symptoms suggesting airway obstruction.    No decreased urination.   Risk factors for antibiotic resistance:    Close contact with a child in .   Self-exam:    Height: 182 centimeters    Weight: 124.7 kilograms    No difficulty moving their chin toward their chest.    Tonsillar edema.    Purulent tonsils.    Palatal petechiae.    Neck lymph nodes feel normal.    Mild periorbital edema.    Has not taken antibiotics for similar symptoms within the past month.   Current medications   Currently taking sertraline 100 MG tablet and QUEtiapine 100 MG tablet.   Medication allergies   None.   Medication contraindication review   Patient has history of depression. Therefore, the following medication(s) will not be prescribed:    Metoclopramide.   No history of metoclopramide-associated dystonic reaction and tardive dyskinesia.   No known history of amoxicillin-clavulanate-associated cholestatic jaundice or hepatic impairment.   No known history of azithromycin-associated cholestatic jaundice or hepatic impairment.   Past medical history   Immune conditions: No immunocompromising conditions. No history of cancer.   Social history   High-risk household contacts: Patient's  household includes one or more members of a group with risk factors for influenza complications, including a child younger than 5 years. Patient's household includes one or more persons with asthma, neurologic or neurodevelopmental conditions, and kidney disorders.   Does not smoke tobacco; smoked previously.   Assessment   Bacterial sinusitis. Ruled out: Traumatic laryngitis.   This is the likely diagnosis based on patient's interview responses and symptoms, including:    Congestion or sinus pressure.    Thick nasal discharge.    Yellow or green mucus.    Duration of symptoms longer than 5 days    Symptoms improved then came back worse.   Plan   Medications:    amoxicillin 875 mg-potassium clavulanate 125 mg tablet RX 875mg/125mg 1 tab PO bid 7d for infection. This medication is an antibiotic. Take it exactly as directed. You must finish the entire course of medication, even if you feel better after the first few days of treatment. Amount is 14 tab.    guaifenesin/dextromethorphan 1200 mg/60 mg tablet OTC 1200mg/60mg 1 tab PO bid PRN 10d for cough. Brands to look for include Mucinex DM Maximum Strength. Amount is 20 tab.   The patient's prescription will be sent to:   [id number]   [facility] [facility] [id number]   Phone: [phone]     Fax: [phone]   Patient informed to purchase OTC medication.   Education:    Condition and causes    Prevention    Treatment and self-care    When to call provider   ----------   Electronically signed by SHAQ Corey on 2022-12-21 at 12:06PM   ----------   Patient Interview Transcript:   Please carefully consider each question and answer as best you can. This helps your provider give you the best care. Which of these symptoms are bothering you? Select all that apply.    Cough    Stuffed-up nose or sinuses    Itchy or watery eyes    Sore throat    Fatigue or tiredness   Not selected:    Shortness of breath    Fever    Runny nose    Itchy  "nose or sneezing    Loss of smell or taste    Hoarse voice or loss of voice    Headache    Sweats    Chills    Muscle or body aches    Nausea or vomiting    Diarrhea    I don't have any of these symptoms   Since your current symptoms started, have you been tested for COVID-19? Select one.    No   Not selected:    Yes   Have you gotten the COVID-19 vaccine? Select one.    Yes   Not selected:    No   How many total doses of the COVID-19 vaccine have you gotten? This includes boosters as well as additional doses for those who are immunocompromised. Select one.    2 doses   Not selected:    1 dose    3 doses    4 doses    5 doses   1st dose    Pfizer   Not selected:    J&J/Soha    Moderna    Novavax   2nd dose    Pfizer   Not selected:    J&J/Soha    Moderna    Novavax   When did you get your most recent dose of the COVID-19 vaccine?    More than 14 days ago   Not selected:    Less than 48 hours (2 days) ago    48 to 72 hours (3 days) ago    3 to 5 days ago    5 to 7 days ago    7 to 14 days ago   In the last 14 days, have you traveled outside of your local community? This includes travel by car, RV, bus, train, or plane. Travel increases your chances of getting and spreading COVID-19. Select one.    No   Not selected:    Yes   In the last 14 days, have you had close contact with someone who has coronavirus (COVID-19)? \"Close contact\" means any of these: - Living in the same household as someone with COVID-19. - Caring for someone with COVID-19. - Being within 6 feet of someone with COVID-19 for a total of at least 15 minutes over a 24-hour period. For example, three 5-minute exposures for a total of 15 minutes. - Being in direct contact with respiratory droplets from someone with COVID-19 (being coughed on, kissing, sharing utensils). Select one.    No, not that I know of   Not selected:    Yes, a confirmed case    Yes, a suspected case   When did your current symptoms start? Select one.    6 to 9 days ago   Not " selected:    Less than 48 hours ago    3 to 5 days ago    10 to 14 days ago    2 to 3 weeks ago    3 to 4 weeks ago    More than a month ago   Do you know the exact date your symptoms started? If so, enter the date as MM/DD/YY. Select one.    No   Not selected:    Yes (specify)   Did your symptoms come on suddenly or gradually? Select one.    Gradually   Not selected:    Suddenly    I'm not sure   Have your symptoms improved at all since they began? Select one.    Yes, but then they came back worse than before   Not selected:    Yes, but they haven't gone away completely    No    I'm not sure   It sounds like you felt better, but now you feel sick again. How long did you feel better? Select one.    1 to 2 days   Not selected:    Less than 1 day    More than 2 days    I'm not sure   Do you cough so hard that it's made you gag or vomit? By gag, we mean has your coughing made you choke or dry heave? Select all that apply.    No   Not selected:    Yes, my coughing has made me gag    Yes, my coughing has made me vomit   When is your cough the worst? Select all that apply.    At nighttime, or while I'm sleeping   Not selected:    In the morning, or when I wake up    During the day    I haven't noticed a difference depending on time of day   Are you coughing up mucus or phlegm? Select one.    Yes, a little   Not selected:    No, my cough is dry    Yes, a lot   What color is most of the mucus or phlegm that you're coughing up? Select one.    Green   Not selected:    Clear    White/frothy    Yellow    Red or pink    I'm not sure   You mentioned having a stuffy nose or sinus congestion. Do you feel pain or pressure in your sinuses?    Yes   Not selected:    No   Where do you feel sinus pain or pressure?    Behind my nose   Not selected:    In my forehead    Around my eyes    In my cheeks    In my upper teeth or jaw    I'm not sure   When did you first notice your sinus pain or pressure? Select one.    5 to 9 days ago   Not  "selected:    Less than 5 days ago    10 to 14 days ago    2 to 4 weeks ago    1 month ago or longer   Does coughing, sneezing, or leaning forward make your sinuses feel worse? Select one.    No   Not selected:    Yes   What color is your nasal drainage? Select one.    Green   Not selected:    Clear    White    Yellow    My nose is stuffed but not draining or running    I'm not sure   Is your nasal drainage thick or thin? Select one.    Thick   Not selected:    Thin   Is there any drainage (mucus) going down the back of your throat? This kind of drainage is also called \"postnasal drip.\" Select one.    Yes   Not selected:    No, not that I know of   Can you swallow liquids and solid foods? A sore throat may be painful when swallowing, but it shouldn't prevent you from swallowing. Select one.    Yes, but it's uncomfortable   Not selected:    Yes, with ease    Yes, but it's painful    It's hard to swallow anything because it feels like liquids and food get stuck in my throat    No, I can't swallow anything, liquid or solid foods   Since your symptoms started, have you felt dizzy? Select one.    No   Not selected:    Yes, but I can continue with my regular daily activities    Yes, and it makes it hard to stand, walk, or do daily activities   Do you have chest pain? You might also feel it as discomfort, aching, tightness, or squeezing in the chest. Select one.    No   Not selected:    Yes   Have you urinated at least 3 times in the last 24 hours? Select one.    Yes   Not selected:    No   Changes in alertness or awareness may mean you need emergency care. Since your symptoms started, have you had any of these? Select all that apply.    None of the above   Not selected:    Confusion    Slurred speech    Not knowing where you are or what day it is    Difficulty staying conscious    Fainting or passing out   Do your symptoms include a whistling sound, or wheezing, when you breathe? Select one.    No   Not selected:    Yes   "  I'm not sure   Are your eyelids or the areas around your eyes puffy? Select one.    Yes, but I can easily open my eye(s)   Not selected:    Yes, and it's hard to open my eye(s)    Yes, and my eye(s) are completely swollen shut    No   Do you have any of these symptoms in your ear(s)? Select all that apply.    None of the above   Not selected:    Pain    Pressure    Fullness    Crackling or popping    Plugged or blocked sensation   Can you move your chin toward your chest?    Yes   Not selected:    No, my neck is too stiff   Are your tonsils larger than usual?    Yes   Not selected:    No, not that I can tell    I've had my tonsils removed   Is there any white or yellow pus on your tonsils?    Yes   Not selected:    No, not that I can see   Are there red spots on the roof of your mouth or the back of your throat?    Yes   Not selected:    No, not that I can see   Are your glands/lymph nodes swollen, or does it hurt when you touch them?    No, not that I can tell   Not selected:    Yes   In the past 2 weeks, has anyone around you (such as at school, work, or home) had a confirmed diagnosis of strep throat? A confirmed diagnosis means that a throat swab and lab test were done to verify a strep throat infection. Select one.    No, not that I know of   Not selected:    Yes   Do you think you might have strep throat? Select one.    Yes   Not selected:    No   In the past week, has anyone around you (such as at school, work, or home) had a confirmed diagnosis of the flu? A confirmed diagnosis means that a nose swab was done to verify a flu infection. Select all that apply.    No, not that I know of   Not selected:    I live with someone who has the flu    I've been within touching distance of someone who has the flu    I've walked by, or sat about 3 feet away from, someone who has the flu    I've been in the same building as someone who has the flu   Have you ever been diagnosed with asthma? Select one.    No   Not  selected:    Yes   Have you ever been prescribed albuterol to use for wheezing, cough, or shortness of breath caused by a cold, bronchitis, or pneumonia? Albuterol (ProAir, Proventil, Ventolin) is prescribed as an inhaler or a solution to be used with a nebulizer machine. Select one.    No, not that I know of   Not selected:    Yes   Have you ever been prescribed a steroid inhaler to use for wheezing, cough, or shortness of breath caused by a cold, bronchitis, or pneumonia? Some examples of steroid inhalers include Pulmicort, Flovent, Qvar, and Alvesco. Select one.    No, not that I know of   Not selected:    Yes   Have you ever been diagnosed with chronic obstructive pulmonary disease (COPD)? Select one.    No, not that I know of   Not selected:    Yes   In the past month, have you taken antibiotics for similar symptoms? Examples of antibiotics include amoxicillin, amoxicillin-clavulanate (Augmentin), penicillin, cefdinir (Omnicef), doxycycline, and clindamycin (Cleocin). Select one.    No   Not selected:    Yes   In the last year, how many times were you treated with antibiotics for a sinus infection? Select one.    1 to 3 times   Not selected:    None    4 or more times   Have you been diagnosed with a deviated septum or nasal polyps? The nose is divided into two nostrils by the septum. A crooked septum is called a deviated septum. Nasal polyps are growths inside the nose or sinuses. Select one.    No, not that I know of   Not selected:    Yes, but I had surgery to treat them    Yes, I have a deviated septum    Yes, I have nasal polyps    Yes, I have a deviated septum and nasal polyps   Do you have a sore inside your nose that won't heal? Select one.    No, not that I know of   Not selected:    Yes   Do you have allergies (pollen, dust mites, mold, animal dander)? Select one.    No, not that I know of   Not selected:    Yes   Have you had a flu shot this season? Select one.    No, not that I know of   Not  selected:    Yes, less than 2 weeks ago    Yes, 2 to 4 weeks ago    Yes, 1 to 3 months ago    Yes, 3 to 6 months ago    Yes, more than 6 months ago   The flu and COVID-19 can be more serious for people with certain conditions or characteristics. These questions help us figure out if you or anyone you live with is at higher risk for complications from these infections. Do either of these statements apply to you? Select all that apply.    None of the above   Not selected:    I'm  or Native [place]    I'm a healthcare worker   Do you smoke tobacco? Select one.    No, I quit   Not selected:    Yes, every day    Yes, some days    No   Do you have any of these conditions? Select all that apply.    None of the above   Not selected:    Chronic lung disease, such as cystic fibrosis or interstitial fibrosis    Heart disease, such as congenital heart disease, congestive heart failure, or coronary artery disease    Disorder of the brain, spinal cord, or nerves and muscles, such as dementia, cerebral palsy, epilepsy, muscular dystrophy, or developmental delay    Metabolic disorder or mitochondrial disease    Cerebrovascular disease, such as stroke or another condition affecting the blood vessels or blood supply to the brain    Down syndrome    Mood disorder, including depression or schizophrenia spectrum disorders    Substance use disorder, such as alcohol, opioid, or cocaine use disorder    Tuberculosis   Do you live in a group care setting? Examples include: - Nursing home - Residential care - Psychiatric treatment facility - Group home - [facility] - Board and care home - Homeless shelter - Foster care setting Select one.    No   Not selected:    Yes   Are you a healthcare worker? Select one.    No   Not selected:    Yes   People with a very high body mass index (BMI) are at higher risk for developing complications from the flu and severe illness from COVID-19. To determine your BMI, we need to know your weight  and height. Please enter your weight (in pounds).    Weight   Please enter your height.    Height   Do you have any of these conditions that can affect the immune system? Scroll to see all options. Select all that apply.    None of these   Not selected:    History of bone marrow transplant    Chronic kidney disease    Chronic liver disease (including cirrhosis)    HIV/AIDS    Inflammatory bowel disease (Crohn's disease or ulcerative colitis)    Lupus    Moderate to severe plaque psoriasis    Multiple sclerosis    Rheumatoid arthritis    Sickle cell anemia    Alpha or beta thalassemia    History of solid organ transplant (kidney, liver, or heart)    History of spleen removal    An autoimmune disorder not listed here    A condition requiring treatment with long-term use of oral steroids (such as prednisone, prednisolone, or dexamethasone)   Have you ever been diagnosed with cancer? Select one.    No   Not selected:    Yes, I have cancer now    Yes, but I'm in remission   Do any of these apply to you? Select all that apply.    I'm in close contact with a child in    Not selected:    I've been hospitalized within the last 5 days    I have diabetes    None of the above   Do any of these apply to the people who live with you? Select all that apply.    A child under the age of 5   Not selected:    An adult 65 or older    A person who is pregnant    A person who has given birth, had a miscarriage, had a pregnancy loss, or had an  in the last 2 weeks    An  or Native [place]    None of the above   Does any member of your household have any of these medical conditions? Select all that apply.    Asthma    Disorders of the brain, spinal cord, or nerves and muscles, such as dementia, cerebral palsy, epilepsy, muscular dystrophy, or developmental delay    Kidney disorders   Not selected:    Chronic lung disease, such as COPD or cystic fibrosis    Heart disease, such as congenital heart disease,  congestive heart failure, or coronary artery disease    Cerebrovascular disease, such as stroke or another condition affecting the blood vessels or blood supply to the brain    Blood disorders, such as sickle cell disease    Diabetes    Metabolic disorders such as inherited metabolic disorders or mitochondrial disease    Liver disorders    Weakened immune system due to illness or medications such as chemotherapy or steroids    Children under the age of 19 who are on long-term aspirin therapy    Extreme obesity (BMI > 40)    None of the above   Do you have any of these conditions? Scroll to see all options. Select all that apply.    Depression   Not selected:    Aspirin triad (also known as Samter's triad or ASA triad)    Asthma or hives from taking aspirin or other NSAIDs, such as ibuprofen or naproxen    Blockage or narrowing of the blood vessels of the heart    Blood dyscrasia, such anemia, leukemia, lymphoma, or myeloma    Bone marrow depression    Catecholamine-releasing paraganglioma    Blood clotting disorder    Congenital long QT syndrome    Difficulty urinating or completely emptying your bladder    Uncorrected electrolyte abnormalities    Fungal infection    Gastrointestinal (GI) bleeding    Gastrointestinal (GI) obstruction    G6PD deficiency    Recent heart attack    High blood pressure    Irregular heartbeat or heart rhythm    Mononucleosis (mono)    Myasthenia gravis    Parkinson's disease    Pheochromocytoma    Reye syndrome    Seizure disorder    Ulcerative colitis    None of the above   Have you ever had either of these conditions? Select all that apply.    No   Not selected:    Metoclopramide-associated dystonic reaction    Tardive dyskinesia   Just a few more questions about medications, and then you're finished. Have you used any non-prescription medications or nasal sprays for your current symptoms? Examples include saline sprays, decongestants, NyQuil, and Tylenol. Select one.    No   Not  selected:    Yes   Have you taken any monoamine oxidase inhibitor (MAOI) medications in the last 14 days? Examples include rasagiline (Azilect), selegiline (Eldepryl, Zelapar), isocarboxazid (Marplan), phenelzine (Nardil), and tranylcypromine (Parnate). Select one.    No, not that I know of   Not selected:    Yes   Do you take Kynmobi or Apokyn (apomorphine)? Select one.    No   Not selected:    Yes   Are you still taking these medications listed in your medical record? If you're not taking any of these, click Next. Select all that apply.    sertraline 100 MG tablet    QUEtiapine 100 MG tablet   Are you taking any other medications, vitamins, or supplements? Select one.    No   Not selected:    Yes   Have you ever had an allergic or bad reaction to any medication? Select one.    No   Not selected:    Yes   Are you allergic to milk or to the proteins found in milk (for example, whey or casein)? A milk allergy is different from lactose intolerance. Select one.    No, not that I know of   Not selected:    Yes   Have you ever had jaundice or liver problems as a result of taking amoxicillin-clavulanate (Augmentin)? Jaundice is a condition in which the skin and the whites of the eyes turn yellow. Select all that apply.    No, not that I know of   Not selected:    Yes, jaundice    Yes, liver problems   Have you ever had jaundice or liver problems as a result of taking azithromycin (Zithromax, Zmax)? Jaundice is a condition in which the skin and the whites of the eyes turn yellow. Select all that apply.    No, not that I know of   Not selected:    Yes, jaundice    Yes, liver problems   Do you need a doctor's note? A doctor's note confirms that you received care today and states when you can return to school or work. It does not contain information about your diagnosis or treatment plan. Your provider will make the final decision on whether to give you a doctor's note and for how long. Doctor's notes CANNOT be backdated. We  can't provide medical leave paperwork through this type of visit. If more paperwork is needed to request time off, contact your primary care provider. Select one.    No   Not selected:    Today only (1 day)    Today and tomorrow (2 days)    3 days    5 days    7 days    10 days    14 days   Is there anything else you'd like to tell us about your symptoms?    My eyes are red and itchy, getting green crumbs often. Nose is stopped up with thick green mucus that I am coughing up as well. Very sore throat.   ----------   Medical history   Medical history data does not currently exist for this patient.

## 2022-12-21 NOTE — EXTERNAL PATIENT INSTRUCTIONS
Diagnosis   Bacterial sinusitis   My name is Althea Dyson, and I'm a healthcare provider at [facility]. I reviewed your interview, and I see that you have bacterial sinusitis.   To prevent the spread of illness to others, I recommend that you stay home and away from other people as much as possible while you're sick. When you need to be around other people, consider wearing a face mask.   Medications   Your pharmacy   [id number] [address] [place] [address] [phone]     Prescription   Amoxicillin-clavulanate (875mg/125mg): Take 1 tablet by mouth twice a day for 7 days for infection. This medication is an antibiotic. Take it exactly as directed. You must finish the entire course of medication, even if you feel better after the first few days of treatment.    Start taking the antibiotics I've prescribed right away. You need to finish the entire course of antibiotics, even if you start to feel better before the pills run out.   Non-prescription   Guaifenesin/dextromethorphan (1200mg/60mg): Take 1 tablet by mouth twice a day as needed for cough. Brands to look for include Mucinex DM Maximum Strength.   About your diagnosis   The sinuses are hollow spaces connected to the nasal passages. Sinusitis occurs when the sinuses swell and block the drainage of fluid and mucus from the nose, causing pain, pressure, and congestion. Fatigue, difficulty sleeping, or decreased appetite may accompany your symptoms.   More than 90% of sinus infections are caused by viruses. However, in certain cases, a sinus infection may be caused by bacteria. Bacterial sinus infections usually look like one of the following cases:    Severe sinus symptoms with a fever over 102F.    Sinus symptoms that have not improved at all after 10 days.    Cold symptoms that slowly improve but then worsen again after 5 or 6 days, usually with a high fever, headache, or nasal discharge.   What to expect   If you follow  this treatment plan, you should start to feel better within a few days.   When to seek care   Call us at [phone]   with any sudden or unexpected symptoms.    Symptoms that last longer than 10 to 14 days.    Symptoms that get better for a few days, and then suddenly get worse.    Fever that measures over 103F or continues for more than 3 days.    Any vision changes.    A worsening headache.    Stiff neck.    Swelling of your forehead or eyes.    Coughing up red or bloody mucus.    Your throat pain becomes worse and makes swallowing extremely difficult or impossible.    More than 5 episodes of diarrhea in a day.    More than 5 episodes of vomiting in a day.    Severe shortness of breath.    Severe chest pain   Other treatment    Rest! Your body needs rest to recover and fight infection.    Drink plenty of water to stay hydrated.    Use steam to soothe your sinuses: Breathe it in from a shower or a bowl of hot water. Placing a warm, moist washcloth over your nose and forehead may help relieve the sinus pain and pressure.    Try non-prescription saline nasal sprays to help your nasal symptoms. Try using a Neti Pot to flush out your stuffy nose and sinuses. Neti Pots are available at any drugstore without a prescription.    Avoid smoke and air pollution. Smoke can make infections worse.   Prevention    Avoid close contact with other people when you're sick.    Cover your mouth and nose when you cough or sneeze. Use a tissue or cough into your elbow. Make sure that used tissues go directly into the trash.    Avoid touching your eyes, nose, or mouth while you're sick.    Wash your hands often, especially after coughing, sneezing, or blowing your nose. If soap and water are not available, use an alcohol-based hand .    If you or someone in your home or workplace is sick, disinfect commonly used items. This includes door handles, tables, computers, remotes, and pens.    Coronavirus (COVID-19) information    Common symptoms of COVID-19 include fever, cough, shortness of breath, fatigue, muscle or body aches, headaches, new loss of sense of taste or smell, sore throat, stuffy or runny nose, nausea or vomiting, and diarrhea. Most people who get COVID-19 have mild symptoms and can rest at home until they get better. Elderly people and those with chronic medical problems may be at risk for more serious complications.   FAQs about the COVID-19 vaccine   There are four authorized COVID-19 vaccines: [facility]'s Soha Vaccine (J&J/Soha), Moderna, Novavax, and Pfizer-BioNTech (Pfizer). The J&J/Soha and Novavax vaccines are approved for use in people aged 18 and older. The Moderna and Pfizer vaccines are approved for those aged 6 months and older. All four are available at no cost. Even if you don't have health insurance, you can still get the COVID-19 vaccine for free.   Which vaccine is the best? Which vaccine should I get?   All four vaccines are highly effective. Even if you get COVID-19 after being vaccinated, all of the vaccines help prevent severe disease, hospitalization, and complications.   Most people should get whichever vaccine is first available to them. However, women younger than 50 years old should consider the rare risk of blood clots with low platelets after vaccination with the J&J/Soha vaccine. This risk hasn't been seen with the other three vaccines.   Are the vaccines safe?   Yes. Hundreds of millions of people in the [place] have already safely received COVID-19 vaccines under the most intense safety monitoring in the history of the [place].   Do I need the vaccine if I've already had COVID?   Yes. Vaccination helps protect you even if you've already had COVID.   If you had COVID-19 and had symptoms, wait to get vaccinated until you've recovered and completed your isolation period.   If you tested positive for COVID-19 but did not have symptoms, you can get vaccinated after 5 full days have passed  since you had a positive test, as long as you don't develop symptoms.   How many doses of the vaccine do I need?   Visit www.cdc.gov/coronavirus/2019-ncov/vaccines/stay-up-to-date.html   to find out how to stay up to date with your COVID-19 vaccines.   I'm immunocompromised. How many doses of the vaccine do I need?   For information on how immunocompromised people can stay up to date with their COVID-19 vaccines, visit www.cdc.gov/coronavirus/2019-ncov/vaccines/recommendations/immuno.html  .   What are the common side effects of the vaccine?   A sore arm, tiredness, headache, and muscle pain may occur within two days of getting the vaccine and last a day or two. For the Moderna or Pfizer vaccines, side effects are more common after the second dose. People over the age of 55 are less likely to have side effects than younger people.   After I'm up to date on vaccines, can I still get or spread COVID?   Yes, you can still get COVID, but your disease should be milder. And your risk of serious illness, hospitalization, and complications will be much lower, especially if you're up to date. Unfortunately, you can still spread COVID if you've been vaccinated. That's why it's important to follow isolation guidelines if you get sick or test positive.   After I'm up to date on vaccines, can I go back to normal?   You should still wear a mask indoors in public if:    It's required by laws, rules, regulations, or local guidance.    You have a weakened immune system.    Your age puts you at increased risk of severe disease.    You have a medical condition that puts you at increased risk of severe disease.    Someone in your household has a weakened immune system, is at increased risk for severe disease, or is unvaccinated.    You're in an area of high transmission.   Where can I get a COVID-19 vaccine?   Visit [facility]'s website for more information. To find a COVID-19 vaccination site near you, visit www.vaccines.gov/  ,  call [phone]  , or text your zip code to 552348 ([place]). Message and data rates may apply.   What about travel?   Travel increases your risk of exposure to COVID-19. For more information, see www.cdc.gov/coronavirus/2019-ncov/travelers/index.html  .   I've had close contact with someone who has COVID. Do I need to quarantine, and if so, for how long?   For the most current answer, including a calculator to determine whether you need to stay home and for how long, visit www.cdc.gov/coronavirus/2019-ncov/your-health/quarantine-isolation.html  .   I've tested positive for COVID. How long do I need to isolate?   For the latest recommendations, including a calculator to determine how long you need to stay home, visit www.cdc.gov/coronavirus/2019-ncov/your-health/quarantine-isolation.html  .   What if I develop symptoms that might be from COVID?   For the latest recommendations on what to do if you're sick, including when to seek emergency care, visit www.cdc.gov/coronavirus/2019-ncov/if-you-are-sick/steps-when-sick.html  .    Flu vaccine information   Who should get a flu vaccine?   Everyone 6 months of age and older should get a yearly flu vaccine.   When should I get vaccinated?   You should get a flu vaccine by the end of October. Once you're vaccinated, it takes about two weeks for antibodies to develop and protect you against the flu. That's why it's important to get vaccinated as soon as possible.   After October, is it too late to get vaccinated?   No. You should still get vaccinated. As long as the flu viruses are still in your community, flu vaccines will remain available, even into January of next year or later.   Why do I need a flu vaccine EVERY year?   Flu viruses are constantly changing, so flu vaccines are usually updated from one season to the next. Your protection from the flu vaccine also lessens over time.   Is the flu vaccine safe?   Yes. Over the last 50 years, hundreds of millions of  Americans have safely received the flu vaccines.   What are the side effects of flu vaccines?   You CANNOT get the flu from a flu vaccine. Common side effects of the flu shot include soreness, redness and/or swelling where the shot was given, low grade fever, and aches. Common side effects of the nasal spray flu vaccine for adults include runny nose, headaches, sore throat, and cough. For children, side effects include wheezing, vomiting, muscle aches, and fever.   Does the flu vaccine increase your risk of getting COVID-19?   No. There is no evidence that getting a flu vaccine increases your risk of getting COVID-19.   Is it safe to get the flu vaccine along with a COVID-19 vaccine?   Yes. It's safe to get the flu vaccine with a COVID-19 vaccine or booster.   Contact your healthcare provider TODAY for details on when and where to get your flu vaccine.   Your provider   Your diagnosis was provided by Althea Dyson, a member of your trusted care team at [facility].   If you have any questions, call us at [phone]  .

## 2023-01-03 ENCOUNTER — TREATMENT (OUTPATIENT)
Dept: PHYSICAL THERAPY | Facility: CLINIC | Age: 28
End: 2023-01-03
Payer: COMMERCIAL

## 2023-01-03 DIAGNOSIS — G89.29 CHRONIC LEFT SHOULDER PAIN: Primary | ICD-10-CM

## 2023-01-03 DIAGNOSIS — M25.60 RANGE OF MOTION DEFICIT: ICD-10-CM

## 2023-01-03 DIAGNOSIS — R53.1 WEAKNESS: ICD-10-CM

## 2023-01-03 DIAGNOSIS — M25.512 CHRONIC LEFT SHOULDER PAIN: Primary | ICD-10-CM

## 2023-01-03 PROCEDURE — 97110 THERAPEUTIC EXERCISES: CPT | Performed by: PHYSICAL THERAPIST

## 2023-01-03 PROCEDURE — 97161 PT EVAL LOW COMPLEX 20 MIN: CPT | Performed by: PHYSICAL THERAPIST

## 2023-01-03 NOTE — PROGRESS NOTES
Physical Therapy Initial Evaluation and Plan of Care  [facility] [place]  [address] [facility], [address] [address]    Patient: Estuardo Crooks   : 1995  Diagnosis/ICD-10 Code:  Chronic left shoulder pain [M25.512, G89.29]  Referring practitioner: SHAQ Sanchez  Date of Initial Visit: 1/3/2023  Today's Date: 1/3/2023  Patient seen for 1 session         Visit Diagnoses:    ICD-10-CM ICD-9-CM   1. Chronic left shoulder pain  M25.512 719.41    G89.29 338.29   2. Weakness  R53.1 780.79   3. Range of motion deficit  M25.60 719.50         Subjective Questionnaire: QuickDASH: 41      Subjective Evaluation    History of Present Illness  Mechanism of injury: The pt reported a 6 month history of L shoulder pain that began with no apparent ASHLY and has progressed in severity since onset.    Pain is absent at rest and is worsened with elevation, lifting, and with sleeping on the L side. Symptoms are generally sharp but he is able to push through them if needed. Pain is general and not isolated to one particular location. He denied any instability. He has not attempted any independent management.    He works overhead appx 20-30% of the time at work and stated he has done this most of his life.         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 No tenderness in the coracoid process, infraspinatus tendon and supraspinatus tendon.     Right Shoulder  No tenderness in the biceps tendon (proximal), coracoid process, infraspinatus tendon, subscapularis tendon and supraspinatus tendon.     Active Range of Motion   Left Shoulder   Flexion: 150 degrees   Abduction: 175 degrees with pain  External rotation 0°: 62 degrees with pain  Internal rotation BTB: T7     Right Shoulder   Flexion: 160 degrees   Abduction: 175 degrees   External rotation 0°: 62 degrees   Internal rotation BTB: T8     Additional Active Range of Motion Details  Active ER reproduced anterolateral L shoulder pain - pt reported this is the most uncomfortable motion    Strength/Myotome Testing     Left Shoulder     Planes of Motion   Flexion: 4   Abduction: 4+   External rotation at 0°: 5   Internal rotation at 0°: 4+   Internal rotation at 45°: 4-   Internal rotation at 90°: 3+     Right Shoulder     Planes of Motion   Flexion: 5   Abduction: 5   External rotation at 0°: 5   Internal rotation at 0°: 5   Internal rotation at 45°: 5   Internal rotation at 90°: 5     Additional Strength Details  Pain and weakness with L IR 45 deg and 90 deg    Tests     Left Shoulder   Positive full can and Speed's.   Negative empty can, Neer's, painful arc and posterior load and shift.           Assessment & Plan     Assessment  Impairments: abnormal muscle firing, abnormal or restricted ROM, impaired physical strength, lacks appropriate home exercise program and pain with function  Functional Limitations: carrying objects, lifting, reaching behind back, reaching overhead and unable to perform repetitive tasks  Assessment details: The patient is a 28 yo male who presented to PT with evolving characteristics of chronic L shoulder pain with low complexity. Signs and symptoms are consistent with cuff dysfunction of the supraspinatus and subscapularis with biceps tendinitis. He had minimal TTP  in the involved mm tendons but had immediate reproduction of symptoms with isolated strength testing. He was prescribed an HEP for isometric cuff strengthening and eccentric biceps flexion. I expect the patient to make a timely recovery with skilled PT intervention.     Prognosis: good    Goals  Plan Goals: Short Term Goals (4 weeks):     1. The patient will be independent and compliant with initial HEP.     2. The patient will report pain at rest 0/10 or less and worst pain 4/10 or less.    3. The patient will display decreased TTP in the biceps tendon and dec mm tension in the surrounding musculature.    4. The patient will demonstrate inc strength evidenced by MMT as follows: flex 5/5, abd 5/5, ER 5/5, and IR 4/5.    5. Quick DASH will improve by 11 points or more.         Long Term Goals (8 weeks):     1. The patient will be appropriate for independent management and compliant with progressed HEP.     2. The patient will report pain at rest 0/10 or less and worst pain 2/10 or less.    3. The patient will return to work duties and/or ADLs with no limitations due to shoulder pain or dysfunction.    4. The patient will return to recreational and community activities with no limitations due to shoulder pain or dysfunction.      Plan  Therapy options: will be seen for skilled therapy services  Planned modality interventions: cryotherapy, iontophoresis, TENS, electrical stimulation/Russian stimulation and thermotherapy (hydrocollator packs)  Planned therapy interventions: ADL retraining, body mechanics training, flexibility, functional ROM exercises, home exercise program, joint mobilization, manual therapy, neuromuscular re-education, postural training, soft tissue mobilization, strengthening, therapeutic activities and stretching  Frequency: 1x week  Duration in visits: 8  Duration in weeks: 8  Treatment plan discussed with: patient  Plan details: The patient will likely benefit from TE/TA/NMED to improve RC  strength, UE proprioception, and scapular mobility. MT will be utilized in addition to stretching for improved GH jt mobility and AROM. Modalities will be used as needed for pain modulation and reduction of swelling. Dry needling as indicated.        History # of Personal Factors and/or Comorbidities: LOW (0)  Examination of Body System(s): # of elements: LOW (1-2)  Clinical Presentation: EVOLVING  Clinical Decision Making: LOW       Timed:         Manual Therapy:    0     mins  57859;     Therapeutic Exercise:    10     mins  82989;     Neuromuscular Nelia:    0    mins  34127;    Therapeutic Activity:     0     mins  47031;     Gait Trainin     mins  65623;     Ultrasound:     0     mins  19891;    Ionto                               0    mins   13291  Self Care                       0     mins   77095  Canalith Repos    0     mins 11525      Un-Timed:  Electrical Stimulation:    0     mins  75173 ( );  Dry Needling     0     mins self-pay  Traction     0     mins 42521  Low Eval     30     Mins  28738  Mod Eval     0     Mins  94670  High Eval                       0     Mins  04474        Timed Treatment:   10   mins   Total Treatment:     40   mins          PT: Delmar Kothari PT     License Number: [id number]  Electronically signed by Delmar Kothari PT, 23, 10:42 AM EST    Certification Period: 1/3/2023 thru 2023  I certify that the therapy services are furnished while this patient is under my care.  The services outlined above are required by this patient, and will be reviewed every 90 days.         Physician Signature:__________________________________________________    PHYSICIAN: Lorena Ferraro APRN  NPI: [id number]                                      DATE:      Please sign and return via fax to [email or web address] . Thank you, [facility].

## 2023-01-09 ENCOUNTER — TELEPHONE (OUTPATIENT)
Dept: PHYSICAL THERAPY | Facility: CLINIC | Age: 28
End: 2023-01-09

## 2023-01-09 ENCOUNTER — TELEMEDICINE (OUTPATIENT)
Dept: FAMILY MEDICINE CLINIC | Facility: TELEHEALTH | Age: 28
End: 2023-01-09
Payer: COMMERCIAL

## 2023-01-09 DIAGNOSIS — J02.9 PHARYNGITIS, UNSPECIFIED ETIOLOGY: Primary | ICD-10-CM

## 2023-01-09 PROCEDURE — 99213 OFFICE O/P EST LOW 20 MIN: CPT | Performed by: NURSE PRACTITIONER

## 2023-01-09 RX ORDER — ONDANSETRON 8 MG/1
8 TABLET, ORALLY DISINTEGRATING ORAL EVERY 8 HOURS PRN
Qty: 12 TABLET | Refills: 0 | Status: SHIPPED | OUTPATIENT
Start: 2023-01-09

## 2023-01-09 RX ORDER — PREDNISONE 10 MG/1
TABLET ORAL DAILY
Qty: 21 EACH | Refills: 0 | Status: SHIPPED | OUTPATIENT
Start: 2023-01-09 | End: 2023-01-11

## 2023-01-09 RX ORDER — COVID-19 MOLECULAR TEST ASSAY
1 KIT MISCELLANEOUS AS NEEDED
Qty: 8 KIT | Refills: 0 | Status: SHIPPED | OUTPATIENT
Start: 2023-01-09

## 2023-01-09 NOTE — PATIENT INSTRUCTIONS
Tylenol for pain and fever, increase fluids and rest.  Salt water gargles and or Cepacol numbing throat spray as needed.    If symptoms worsen or do not improve follow up with your PCP or visit your nearest Urgent Care Center or ER.

## 2023-01-09 NOTE — PROGRESS NOTES
Subjective   No chief complaint on file.      Estuardo Crooks is a 27 y.o. male.     History of Present Illness  Patient presents with complaints of severe sore throat, congestion low-grade fever, body aches and nausea.  He was seen yesterday at an urgent care for the symptoms and tested negative for strep throat.  He woke up today and his throat is even more sore and the roof of his mouth is beet red.  He has not taken a COVID test.  Denies known sick contacts.  Sore Throat   This is a new problem. The current episode started yesterday. The problem has been rapidly worsening. Maximum temperature: low grade. Associated symptoms include swollen glands and trouble swallowing. Pertinent negatives include no abdominal pain, congestion, coughing, diarrhea, headaches, hoarse voice, neck pain or shortness of breath. Treatments tried: otc meds. The treatment provided no relief.        No Known Allergies    Past Medical History:   Diagnosis Date   • Anxiety    • Depression    • GERD (gastroesophageal reflux disease)    • Obesity        Past Surgical History:   Procedure Laterality Date   • ROOT CANAL  2020       Social History     Socioeconomic History   • Marital status: Significant Other   Tobacco Use   • Smoking status: Former     Packs/day: 0.50     Years: 0.00     Pack years: 0.00     Types: Cigarettes     Quit date: 2021     Years since quittin.9   • Smokeless tobacco: Former     Quit date: 2021   Vaping Use   • Vaping Use: Some days   • Last attempt to quit: 2022   • Substances: Nicotine   • Devices: Pre-filled or refillable cartridge, Refillable tank   • Passive vaping exposure: Yes   Substance and Sexual Activity   • Alcohol use: Yes     Comment: occasional social use   • Drug use: No   • Sexual activity: Yes     Partners: Female       Family History   Problem Relation Age of Onset   • Arthritis Father    • Colon polyps Father    • Heart attack Maternal Grandmother    • COPD Maternal  Grandmother    • Diabetes Maternal Grandfather          Current Outpatient Medications:   •  COVID-19 At Home Antigen Test (BinaxNOW COVID-19 Ag Home Test) kit, 1 each into the nostril(s) as directed by provider As Needed (COVID symptoms)., Disp: 8 kit, Rfl: 0  •  ondansetron ODT (ZOFRAN-ODT) 8 MG disintegrating tablet, Place 1 tablet on the tongue Every 8 (Eight) Hours As Needed for Nausea or Vomiting., Disp: 12 tablet, Rfl: 0  •  predniSONE (DELTASONE) 10 MG (21) dose pack, Take  by mouth Daily for 6 days., Disp: 21 each, Rfl: 0  •  QUEtiapine (SEROquel) 100 MG tablet, Take 1 tablet by mouth Every Night., Disp: 90 tablet, Rfl: 1  •  sertraline (Zoloft) 100 MG tablet, Take 1 tablet by mouth Daily., Disp: 30 tablet, Rfl: 5      Review of Systems   Constitutional: Positive for fever. Negative for chills, diaphoresis and fatigue.   HENT: Positive for sore throat, swollen glands and trouble swallowing. Negative for congestion and hoarse voice.    Respiratory: Negative for cough, chest tightness, shortness of breath and wheezing.    Gastrointestinal: Positive for nausea. Negative for abdominal distention, abdominal pain and diarrhea.   Musculoskeletal: Positive for myalgias. Negative for neck pain.   Neurological: Negative for headache.        There were no vitals filed for this visit.    Objective   Physical Exam  Constitutional:       General: He is not in acute distress.     Appearance: Normal appearance. He is not ill-appearing, toxic-appearing or diaphoretic.   HENT:      Head: Normocephalic.      Mouth/Throat:      Lips: Pink.      Mouth: Mucous membranes are moist.      Pharynx: Posterior oropharyngeal erythema present.      Tonsils: No tonsillar exudate.   Pulmonary:      Effort: Pulmonary effort is normal.   Neurological:      Mental Status: He is alert and oriented to person, place, and time.   Psychiatric:         Mood and Affect: Mood normal.         Behavior: Behavior normal.          Procedures      Assessment & Plan   Diagnoses and all orders for this visit:    1. Pharyngitis, unspecified etiology (Primary)  -     predniSONE (DELTASONE) 10 MG (21) dose pack; Take  by mouth Daily for 6 days.  Dispense: 21 each; Refill: 0  -     ondansetron ODT (ZOFRAN-ODT) 8 MG disintegrating tablet; Place 1 tablet on the tongue Every 8 (Eight) Hours As Needed for Nausea or Vomiting.  Dispense: 12 tablet; Refill: 0  -     COVID-19 At Home Antigen Test (BinaxNOW COVID-19 Ag Home Test) kit; 1 each into the nostril(s) as directed by provider As Needed (COVID symptoms).  Dispense: 8 kit; Refill: 0    Tylenol for pain and fever, increase fluids and rest.  Salt water gargles and or Cepacol numbing throat spray as needed.    If symptoms worsen or do not improve follow up with your PCP or visit your nearest Urgent Care Center or ER.    Results for orders placed or performed during the hospital encounter of 01/08/23   POC Rapid Strep A    Specimen: Swab   Result Value Ref Range    Rapid Strep A Screen Negative     Internal Control Passed     Lot Number [id number]     Expiration Date 5/31/24        PLAN: Discussed dosing, side effects, recommended other symptomatic care.  Patient should follow up with primary care provider, Urgent Care or ER if symptoms worsen, fail to resolve or other symptoms need attention. Patient/family agree to the above.         SHAQ Kennedy     The use of a video visit has been reviewed with the patient and verbal informed consent has been obtained. Myself and Esturado Crooks participated in this visit. The patient is located at [address] [place] [address]. I am located in [place], [place]. [employer] and [employer] were utilized.        This visit was performed via Telehealth.  This patient has been instructed to follow-up with their primary care provider if their symptoms worsen or the treatment provided does not resolve their illness.

## 2023-01-11 ENCOUNTER — OFFICE VISIT (OUTPATIENT)
Dept: INTERNAL MEDICINE | Facility: CLINIC | Age: 28
End: 2023-01-11
Payer: COMMERCIAL

## 2023-01-11 VITALS
SYSTOLIC BLOOD PRESSURE: 122 MMHG | HEART RATE: 96 BPM | WEIGHT: 272.3 LBS | RESPIRATION RATE: 16 BRPM | BODY MASS INDEX: 36.93 KG/M2 | DIASTOLIC BLOOD PRESSURE: 66 MMHG | TEMPERATURE: 97.3 F

## 2023-01-11 DIAGNOSIS — J06.9 UPPER RESPIRATORY TRACT INFECTION, UNSPECIFIED TYPE: ICD-10-CM

## 2023-01-11 DIAGNOSIS — J02.9 PHARYNGITIS, UNSPECIFIED ETIOLOGY: Primary | ICD-10-CM

## 2023-01-11 DIAGNOSIS — J06.9 VIRAL UPPER RESPIRATORY TRACT INFECTION: ICD-10-CM

## 2023-01-11 LAB
EXPIRATION DATE: NORMAL
FLUAV AG UPPER RESP QL IA.RAPID: NOT DETECTED
FLUBV AG UPPER RESP QL IA.RAPID: NOT DETECTED
HETEROPH AB SER QL LA: NEGATIVE
INTERNAL CONTROL: NORMAL
Lab: NORMAL
S PYO AG THROAT QL: NEGATIVE
SARS-COV-2 RNA RESP QL NAA+PROBE: NOT DETECTED

## 2023-01-11 PROCEDURE — 87491 CHLMYD TRACH DNA AMP PROBE: CPT | Performed by: NURSE PRACTITIONER

## 2023-01-11 PROCEDURE — 86308 HETEROPHILE ANTIBODY SCREEN: CPT | Performed by: NURSE PRACTITIONER

## 2023-01-11 PROCEDURE — 96372 THER/PROPH/DIAG INJ SC/IM: CPT | Performed by: NURSE PRACTITIONER

## 2023-01-11 PROCEDURE — 87591 N.GONORRHOEAE DNA AMP PROB: CPT | Performed by: NURSE PRACTITIONER

## 2023-01-11 PROCEDURE — 87252 VIRUS INOCULATION TISSUE: CPT | Performed by: NURSE PRACTITIONER

## 2023-01-11 PROCEDURE — 87428 SARSCOV & INF VIR A&B AG IA: CPT | Performed by: NURSE PRACTITIONER

## 2023-01-11 PROCEDURE — 99214 OFFICE O/P EST MOD 30 MIN: CPT | Performed by: NURSE PRACTITIONER

## 2023-01-11 PROCEDURE — 87081 CULTURE SCREEN ONLY: CPT | Performed by: NURSE PRACTITIONER

## 2023-01-11 PROCEDURE — 87147 CULTURE TYPE IMMUNOLOGIC: CPT | Performed by: NURSE PRACTITIONER

## 2023-01-11 PROCEDURE — 87880 STREP A ASSAY W/OPTIC: CPT | Performed by: NURSE PRACTITIONER

## 2023-01-11 RX ORDER — DEXAMETHASONE SODIUM PHOSPHATE 4 MG/ML
4 INJECTION, SOLUTION INTRA-ARTICULAR; INTRALESIONAL; INTRAMUSCULAR; INTRAVENOUS; SOFT TISSUE ONCE
Status: SHIPPED | OUTPATIENT
Start: 2023-01-11

## 2023-01-11 RX ORDER — CEFTRIAXONE 1 G/1
1 INJECTION, POWDER, FOR SOLUTION INTRAMUSCULAR; INTRAVENOUS ONCE
Status: COMPLETED | OUTPATIENT
Start: 2023-01-11 | End: 2023-01-11

## 2023-01-11 RX ORDER — DEXAMETHASONE SODIUM PHOSPHATE 4 MG/ML
8 INJECTION, SOLUTION INTRA-ARTICULAR; INTRALESIONAL; INTRAMUSCULAR; INTRAVENOUS; SOFT TISSUE ONCE
Status: COMPLETED | OUTPATIENT
Start: 2023-01-11 | End: 2023-01-11

## 2023-01-11 RX ORDER — DEXAMETHASONE SODIUM PHOSPHATE 4 MG/ML
4 INJECTION, SOLUTION INTRA-ARTICULAR; INTRALESIONAL; INTRAMUSCULAR; INTRAVENOUS; SOFT TISSUE ONCE
Status: DISCONTINUED | OUTPATIENT
Start: 2023-01-11 | End: 2023-01-11

## 2023-01-11 RX ADMIN — DEXAMETHASONE SODIUM PHOSPHATE 8 MG: 4 INJECTION, SOLUTION INTRA-ARTICULAR; INTRALESIONAL; INTRAMUSCULAR; INTRAVENOUS; SOFT TISSUE at 16:48

## 2023-01-11 RX ADMIN — CEFTRIAXONE 1 G: 1 INJECTION, POWDER, FOR SOLUTION INTRAMUSCULAR; INTRAVENOUS at 16:00

## 2023-01-11 NOTE — PROGRESS NOTES
Chief Complaint  Sore Throat (Since Sunday morning. Took a at home Covid test results were negative.)    Subjective          Estuardo Crooks presents to [facility]  History of Present Illness    The patient presents today with complaints of a sore throat.    The patient reports experiencing cold symptoms such as rhinorrhea, stuffy nose and a sore throat on Sunday, 01/08/2023 and have progressively worsened. He was seen at [facility] Sunday, 01/08/2023, where he was tested for strep throat and did a virtual visit Monday, 01/09/2023. The patient was prescribed prednisone by the doctor which has not been helpful. He took 1 or 2 tablets of prednisone the first day and tried to take the last 3 tablets last night, 01/10/2023, but he took too much of a drink of water and vomited. If he does not drink water right, it will cause him to vomit. The patient is unable to sleep, eat, or drink. He reports experiencing headaches, dizziness, fever, sweat, and chills. His fever 3:00am was 102.2 degrees Fahrenheit. The patient reports abdominal pain, nausea, and vomiting. He has not been experiencing diarrhea, rashes, or chest pain. The patient reports shortness of breath due to his cough.      Current Outpatient Medications:   •  ondansetron ODT (ZOFRAN-ODT) 8 MG disintegrating tablet, Place 1 tablet on the tongue Every 8 (Eight) Hours As Needed for Nausea or Vomiting., Disp: 12 tablet, Rfl: 0  •  QUEtiapine (SEROquel) 100 MG tablet, Take 1 tablet by mouth Every Night., Disp: 90 tablet, Rfl: 1  •  sertraline (Zoloft) 100 MG tablet, Take 1 tablet by mouth Daily., Disp: 30 tablet, Rfl: 5  •  COVID-19 At Home Antigen Test (BinaxNOW COVID-19 Ag Home Test) kit, 1 each into the nostril(s) as directed by provider As Needed (COVID symptoms)., Disp: 8 kit, Rfl: 0    Current Facility-Administered Medications:   •  dexamethasone (DECADRON) injection 4 mg, 4 mg, Intramuscular, Once, Lorena Ferraro,  APRN  •  dexamethasone (DECADRON) injection 4 mg, 4 mg, Intramuscular, Once, Lorena Ferraro R, APRN         Objective   Vital Signs:   /66 (BP Location: Right arm, Patient Position: Sitting, Cuff Size: Adult)   Pulse 96   Temp 97.3 °F (36.3 °C) (Infrared)   Resp 16   Wt 124 kg (272 lb 4.8 oz)   BMI 36.93 kg/m²     Physical Exam  Vitals and nursing note reviewed.   Constitutional:       General: He is not in acute distress.     Appearance: He is well-developed. He is not ill-appearing, toxic-appearing or diaphoretic.   HENT:      Head: Normocephalic and atraumatic. No abrasion. Hair is normal.      Right Ear: Hearing, tympanic membrane, ear canal and external ear normal. No foreign body. Tympanic membrane is not perforated or erythematous.      Left Ear: Hearing, tympanic membrane, ear canal and external ear normal. No foreign body. Tympanic membrane is not perforated or erythematous.      Nose: Nose normal. No septal deviation, mucosal edema or rhinorrhea.      Mouth/Throat:      Mouth: No oral lesions.      Dentition: Normal dentition.      Comments: Erythematous edematous fragile bleeding during throat swab.  Eyes:      General: Lids are normal. No scleral icterus.        Right eye: No discharge.         Left eye: No discharge.      Conjunctiva/sclera: Conjunctivae normal.      Right eye: Right conjunctiva is not injected.      Left eye: Left conjunctiva is not injected.      Pupils: Pupils are equal, round, and reactive to light.   Neck:      Thyroid: No thyroid mass or thyromegaly.   Cardiovascular:      Rate and Rhythm: Normal rate and regular rhythm.      Heart sounds: Normal heart sounds. No murmur heard.    No friction rub. No gallop.   Pulmonary:      Effort: Pulmonary effort is normal. No accessory muscle usage.      Breath sounds: Normal breath sounds. No rhonchi or rales.   Chest:      Chest wall: No tenderness.   Abdominal:      General: Bowel sounds are normal. There is no distension.       Palpations: Abdomen is soft. There is no hepatomegaly.      Tenderness: There is no abdominal tenderness.   Musculoskeletal:         General: No tenderness or deformity. Normal range of motion.      Cervical back: Full passive range of motion without pain, normal range of motion and neck supple. No edema. Normal range of motion.   Lymphadenopathy:      Cervical: No cervical adenopathy.   Skin:     General: Skin is warm and dry.      Findings: No abrasion, erythema or rash.      Nails: There is no clubbing.   Neurological:      Mental Status: He is alert, oriented to person, place, and time and easily aroused.      Cranial Nerves: No cranial nerve deficit.      Coordination: Coordination normal.      Deep Tendon Reflexes: Reflexes are normal and symmetric.      Comments: Muscle strength 5/5 and equal throughout.   Psychiatric:         Speech: Speech normal.         Behavior: Behavior normal. Behavior is cooperative.        Result Review :                 Assessment and Plan    Diagnoses and all orders for this visit:    1. Pharyngitis, unspecified etiology (Primary)  -     Cancel: GC/Chlamydia - Miscellaneous Test; Future  -     cefTRIAXone (ROCEPHIN) injection 1 g  -     Discontinue: dexamethasone (DECADRON) injection 4 mg  -     dexamethasone (DECADRON) injection 4 mg  -     Virus Culture - , Oropharynx; Future  -     dexamethasone (DECADRON) injection 4 mg  -     dexamethasone (DECADRON) injection 8 mg  -     Virus Culture - Swab, Oropharynx  -     Cancel: GC/Chlamydia - Miscellaneous Test  -     GC/Chlamydia - Miscellaneous Test; Future  -     Cancel: GC/Chlamydia - Miscellaneous Test  -     POC Infectious Mononucleosis Antibody; Future  -     POC Infectious Mononucleosis Antibody  -     Ct / GC ROSA, Pharyngeal - Swab, Throat    2. Viral upper respiratory tract infection  -     Cancel: POC Influenza A / B; Future  -     Cancel: POC Rapid Strep A; Future  -     Covid-19 + Flu A&B AG, Veritor; Future  -     Beta  Strep Culture, Throat - , Nasopharynx; Future  -     Beta Strep Culture, Throat - Swab, Throat  -     dexamethasone (DECADRON) injection 8 mg  -     Covid-19 + Flu A&B AG, Veritor  -     POC Rapid Strep A  -     GC/Chlamydia - Miscellaneous Test; Future  -     Cancel: GC/Chlamydia - Miscellaneous Test  -     Ct / GC ROSA, Pharyngeal - Swab, Throat    3. Upper respiratory tract infection, unspecified type  -     POC Rapid Strep A  -     POC Influenza A / B  -     Ct / GC ORSA, Pharyngeal - Swab, Throat      Viral upper respiratory tract infection (Primary)  -     Will obtain testing for COVID-19, strep and flu today.  -     Beta Strep Culture, Throat - , Nasopharynx; Future      1/12/2023 addendum positive strep throat.    Follow Up   Return if symptoms worsen or fail to improve.  Patient was given instructions and counseling regarding his condition or for health maintenance advice. Please see specific information pulled into the AVS if appropriate.     RTC/call  If symptoms worsen  Meds MOA and SE's reviewed and pt v/u    SHAQ Hirsch  [facility]  [address] [address]  Fax [phone]  Phone [phone]    Transcribed from ambient dictation for SHAQ Hirsch by Thor Salguero.  01/11/23   15:30 EST    Patient or patient representative verbalized consent to the visit recording.  I have personally performed the services described in this document as transcribed by the above individual, and it is both accurate and complete.

## 2023-01-12 LAB — BACTERIA SPEC AEROBE CULT: ABNORMAL

## 2023-01-12 RX ORDER — AMOXICILLIN 875 MG/1
875 TABLET, COATED ORAL EVERY 12 HOURS SCHEDULED
Qty: 20 TABLET | Refills: 0 | Status: SHIPPED | OUTPATIENT
Start: 2023-01-12

## 2023-01-13 LAB
C TRACH RRNA NPH QL NAA+PROBE: NEGATIVE
EXPIRATION DATE: NORMAL
INTERNAL CONTROL: NORMAL
Lab: NORMAL
N GONORRHOEA RRNA NPH QL NAA+PROBE: NEGATIVE
S PYO AG THROAT QL: NEGATIVE

## 2023-01-16 ENCOUNTER — TELEPHONE (OUTPATIENT)
Dept: PHYSICAL THERAPY | Facility: CLINIC | Age: 28
End: 2023-01-16

## 2023-01-20 ENCOUNTER — PATIENT MESSAGE (OUTPATIENT)
Dept: INTERNAL MEDICINE | Facility: CLINIC | Age: 28
End: 2023-01-20
Payer: COMMERCIAL

## 2023-01-23 LAB — VIRUS SPEC CULT: NORMAL

## 2023-04-15 DIAGNOSIS — F43.21 GRIEF: ICD-10-CM

## 2023-04-15 DIAGNOSIS — F41.9 ANXIETY: ICD-10-CM

## 2023-04-17 RX ORDER — SERTRALINE HYDROCHLORIDE 100 MG/1
100 TABLET, FILM COATED ORAL DAILY
Qty: 30 TABLET | Refills: 5 | Status: SHIPPED | OUTPATIENT
Start: 2023-04-17

## 2023-05-02 ENCOUNTER — OFFICE VISIT (OUTPATIENT)
Dept: INTERNAL MEDICINE | Facility: CLINIC | Age: 28
End: 2023-05-02
Payer: COMMERCIAL

## 2023-05-02 VITALS
HEART RATE: 80 BPM | RESPIRATION RATE: 18 BRPM | DIASTOLIC BLOOD PRESSURE: 64 MMHG | SYSTOLIC BLOOD PRESSURE: 118 MMHG | WEIGHT: 297 LBS | BODY MASS INDEX: 40.23 KG/M2 | HEIGHT: 72 IN | TEMPERATURE: 97.8 F

## 2023-05-02 DIAGNOSIS — E66.01 CLASS 3 SEVERE OBESITY DUE TO EXCESS CALORIES WITH SERIOUS COMORBIDITY AND BODY MASS INDEX (BMI) OF 40.0 TO 44.9 IN ADULT: Primary | ICD-10-CM

## 2023-05-02 PROCEDURE — 99214 OFFICE O/P EST MOD 30 MIN: CPT | Performed by: NURSE PRACTITIONER

## 2023-05-02 NOTE — PATIENT INSTRUCTIONS
MyPlate from USDA  MyPlate is an outline of a general healthy diet based on the Dietary Guidelines for Americans, 9294-1025, from the U.S. Department of Agriculture (USDA). It sets guidelines for how much food you should eat from each food group based on your age, sex, and level of physical activity.  What are tips for following MyPlate?  To follow MyPlate recommendations:  Eat a wide variety of fruits and vegetables, grains, and protein foods.  Serve smaller portions and eat less food throughout the day.  Limit portion sizes to avoid overeating.  Enjoy your food.  Get at least 150 minutes of exercise every week. This is about 30 minutes each day, 5 or more days per week.  It can be difficult to have every meal look like MyPlate. Think about MyPlate as eating guidelines for an entire day, rather than each individual meal.  Fruits and vegetables  Make one half of your plate fruits and vegetables.  Eat many different colors of fruits and vegetables each day.  For a 2,000-calorie daily food plan, eat:  2½ cups of vegetables every day.  2 cups of fruit every day.  1 cup is equal to:  1 cup raw or cooked vegetables.  1 cup raw fruit.  1 medium-sized orange, apple, or banana.  1 cup 100% fruit or vegetable juice.  2 cups raw leafy greens, such as lettuce, spinach, or kale.  ½ cup dried fruit.  Grains  One fourth of your plate should be grains.  Make at least half of the grains you eat each day whole grains.  For a 2,000-calorie daily food plan, eat 6 oz of grains every day.  1 oz is equal to:  1 slice bread.  1 cup cereal.  ½ cup cooked rice, cereal, or pasta.  Protein  One fourth of your plate should be protein.  Eat a wide variety of protein foods, including meat, poultry, fish, eggs, beans, nuts, and tofu.  For a 2,000-calorie daily food plan, eat 5½ oz of protein every day.  1 oz is equal to:  1 oz meat, poultry, or fish.  ¼ cup cooked beans.  1 egg.  ½ oz nuts or seeds.  1 Tbsp peanut butter.  Dairy  Drink fat-free  or low-fat (1%) milk.  Eat or drink dairy as a side to meals.  For a 2,000-calorie daily food plan, eat or drink 3 cups of dairy every day.  1 cup is equal to:  1 cup milk, yogurt, cottage cheese, or soy milk (soy beverage).  2 oz processed cheese.  1½ oz natural cheese.  Fats, oils, salt, and sugars  Only small amounts of oils are recommended.  Avoid foods that are high in calories and low in nutritional value (empty calories), like foods high in fat or added sugars.  Choose foods that are low in salt (sodium). Choose foods that have less than 140 milligrams (mg) of sodium per serving.  Drink water instead of sugary drinks. Drink enough fluid to keep your urine pale yellow.  Where to find support  Work with your health care provider or a dietitian to develop a customized eating plan that is right for you.  Download an rainer (mobile application) to help you track your daily food intake.  Where to find more information  USDA: ChooseMyPlate.gov  Summary  MyPlate is a general guideline for healthy eating from the USDA. It is based on the Dietary Guidelines for Americans, 5878-1220.  In general, fruits and vegetables should take up one half of your plate, grains should take up one fourth of your plate, and protein should take up one fourth of your plate.  This information is not intended to replace advice given to you by your health care provider. Make sure you discuss any questions you have with your health care provider.  Document Revised: 11/08/2021 Document Reviewed: 11/08/2021  [employer] Patient Education © 2022 Else[employer] Inc.

## 2023-05-02 NOTE — PROGRESS NOTES
Chief Complaint  Weight Loss (Discuss options. )    Subjective          Estuardo Crooks presents to [facility]  History of Present Illness  Patient is here today for concern with weight gain.  Patient discussed options to treat weight gain.  He has gained 25 pounds since his last visit.      The patient presents today to discuss weight loss.       Weight gain   The patient has gained 25 pounds since 01/2023. He states he can feel his weight more when he gains weight and less when he loses weight. The patient reports he has lost weight in the past, however; he has not been able to maintain the weight loss. He states he gets burned out and goes right back to where it started every time. The patient reports he has a higher goal, but he ends up losing 15 to 20 pounds. He states he will go full fledge and eat nothing but salads, scrambled eggs, and drink nothing but water and coffee. The patient reports he will do that for a couple of months and then he will get sick of eating the same thing every day and then slowly start going back. He states he is not ready to do the surgeries yet. He states that his wife is pregnant right now as well. He states that she does the grocery shopping. 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  He states that some days in the office, some days in the field.       Current Outpatient Medications:   •  ondansetron ODT (ZOFRAN-ODT) 8 MG disintegrating tablet, Place 1 tablet on the tongue Every 8 (Eight) Hours As Needed for Nausea or Vomiting., Disp: 12 tablet, Rfl: 0  •  QUEtiapine (SEROquel) 100 MG tablet, Take 1 tablet by mouth Every Night., Disp: 90 tablet, Rfl: 1  •  sertraline (ZOLOFT) 100 MG tablet, TAKE 1 TABLET BY MOUTH DAILY, Disp: 30 tablet, Rfl: 5  •  Semaglutide,0.25 or 0.5MG/DOS, (OZEMPIC) 2 MG/1.5ML solution pen-injector, Inject 0.25 mg under the skin into the appropriate area as directed 1  "(One) Time Per Week for 28 days., Disp: 1 mL, Rfl: 0         Objective   Vital Signs:   /64 (BP Location: Right arm, Patient Position: Sitting, Cuff Size: Adult)   Pulse 80   Temp 97.8 °F (36.6 °C) (Infrared)   Resp 18   Ht 182.9 cm (72\")   Wt 135 kg (297 lb)   BMI 40.28 kg/m²     Physical Exam  Vitals and nursing note reviewed.   Constitutional:       General: He is not in acute distress.     Appearance: Normal appearance. He is well-developed. He is not ill-appearing.   HENT:      Head: Normocephalic and atraumatic.   Eyes:      General: No scleral icterus.  Neck:      Thyroid: No thyromegaly.   Cardiovascular:      Rate and Rhythm: Normal rate and regular rhythm.   Pulmonary:      Effort: Pulmonary effort is normal.      Breath sounds: Normal breath sounds.   Abdominal:      General: Bowel sounds are normal. There is no distension.      Palpations: Abdomen is soft.      Tenderness: There is no abdominal tenderness.   Lymphadenopathy:      Cervical: No cervical adenopathy.   Skin:     Capillary Refill: Capillary refill takes 2 to 3 seconds.      Coloration: Skin is not pale.   Neurological:      Mental Status: He is alert and oriented to person, place, and time.   Psychiatric:         Mood and Affect: Mood normal.         Behavior: Behavior normal.        Result Review :                 Assessment and Plan    Diagnoses and all orders for this visit:    1. Class 3 severe obesity due to excess calories with serious comorbidity and body mass index (BMI) of 40.0 to 44.9 in adult (Primary)    Other orders  -     Semaglutide,0.25 or 0.5MG/DOS, (OZEMPIC) 2 MG/1.5ML solution pen-injector; Inject 0.25 mg under the skin into the appropriate area as directed 1 (One) Time Per Week for 28 days.  Dispense: 1 mL; Refill: 0      I spent 25 minutes caring for Estuardo on this date of service. This time includes time spent by me in the following activities:preparing for the visit, performing a medically appropriate " examination and/or evaluation , counseling and educating the patient/family/caregiver, ordering medications, tests, or procedures, documenting information in the medical record and greater than 50% time in counseling    Class 3 Severe Obesity (BMI >=40). Obesity-related health conditions include the following: insomnia which may be related to sleep apena needs evaluation. Obesity is worsening. BMI is is above average; BMI management plan is completed. We discussed low calorie, low carb based diet program, portion control, increasing exercise, joining a fitness center or start home based exercise program, management of depression/anxiety/stress to control compensatory eating, decreasing alcohol consumption, pharmacologic options including injectable GIP/GLP-1 receptor agonist  and an rainer-based approach such as [employer] Pal or Lose It.       Obesity        -  I encouraged the patient to start looking at ways to expand what he is doing in the line of food.        -  I encouraged the patient to try NOOM or My Fitness Pal.         -   We discussed that the medicines themselves should not be given for anybody who has a family history of thyroid cancer verbalizes no family history of thyroid cancer.  Understands risk and benefits of medication.  Understands need for diet and exercise..         - The patient will start Ozempic once a week.        -  He will follow up in 3 months.    30-minute visit with over 50% - 25 minutes spent in counseling regarding medication LISA side effects diet and exercise strategies.    Follow Up   Return in 1 month (on 6/2/2023), or if symptoms worsen or fail to improve.  Patient was given instructions and counseling regarding his condition or for health maintenance advice. Please see specific information pulled into the AVS if appropriate.     RTC/call  If symptoms worsen  Meds MOA and SE's reviewed and pt v/u  Transcribed from ambient dictation for SHAQ Hirsch by Corazon  Albin.  05/02/23   11:06 EDT    Patient or patient representative verbalized consent to the visit recording.    SHAQ Hirsch [facility]  [address]  [facility] [address]  Fax [phone]  Phone [phone]

## 2023-05-09 ENCOUNTER — TELEPHONE (OUTPATIENT)
Dept: INTERNAL MEDICINE | Facility: CLINIC | Age: 28
End: 2023-05-09
Payer: COMMERCIAL

## 2023-05-09 NOTE — TELEPHONE ENCOUNTER
PA Submitted     Estuardo Crooks (Key: [id number])  Rx #: [id number]  Ozempic (0.25 or 0.5 MG/DOSE) 2MG/3ML pen-injectors    Pt notified about normal PA protocol and timeline. Pt verb good understanding, agreement, and appreciation

## 2023-05-09 NOTE — TELEPHONE ENCOUNTER
Caller: Estuardo Crooks    Relationship: Self    Best call back number: [phone]    What test/procedure requested: OZEMPIC    When is it needed: ASAP    Where is the test/procedure going to be performed: [facility][id number] [address] AT [facility] & [facility] - [phone]  - [phone]   [phone]    Additional information or concerns: PATIENT STATES INSURANCE HAD DENIED THE OZEMPIC.

## 2023-05-15 NOTE — TELEPHONE ENCOUNTER
I have spoken with Estuardo  And let him know that his PA has been denied. Insurance does not cover ozempic as weight loss treatment. He will call his insurance to find out if other medications are approved. He demonstrates appreciation and understanding.

## 2023-05-18 ENCOUNTER — TELEPHONE (OUTPATIENT)
Dept: INTERNAL MEDICINE | Facility: CLINIC | Age: 28
End: 2023-05-18
Payer: COMMERCIAL

## 2023-05-18 NOTE — TELEPHONE ENCOUNTER
CC: Annual  HPI: Pt is a 20 y.o.  female who presents for routine annual exam. She uses ocps for contraception. She does not want STD screening- same partner. Has been on ocps for several years now. Has noticed a gradual increase in weight, questioning if hormonal SE. Does not exercise. Admits to occ missing a pill in her pack, interested in trying the patch. Here for school, has two years left.     NOTES FROM LAST VISIT WITH ME IN 4/2022  CC: Screening for sexually transmitted infection  Christianne Patel is a 19 y.o. female No obstetric history on file. presents for STD screening and possible BV.  Patient's last menstrual period was 03/28/2022 (exact date)..  New pt- she uses ocps for contraception- has not been sexually active in a few months. Used a BA suppository last night for vaginal odor. Unsure if actual BV- had this once before, foul odor comes and goes, notices it more after her period. No itching. Has a thin white milky discharge. Denies any new rashes or lesions.  Denies pelvic or abdominal pain.  Denies vulvovaginal itching or irritation. Usually takes her ocp at night but sometimes forgets and will take in the morning gets nausea sometimes w this.    FH:   Breast cancer: maternal grandmother  Colon cancer: none  Ovarian cancer: none  Uterine cancer: none    HPV vaccine: yes  Last pap smear: none on file  History of abnormal pap smears: ascus in 2021    Colonoscopy: na  DEXA: na  Mammogram: na  STD history: no  Birth control: ocps  OB history: G0  Tobacco use: no    ROS:  GENERAL: Feeling well overall. Denies fever or chills.   SKIN: Denies rash or lesions.   HEAD: Denies head injury or headache.   NODES: Denies enlarged lymph nodes.   CHEST: Denies chest pain or shortness of breath.   CARDIOVASCULAR: Denies palpitations or left sided chest pain.   ABDOMEN: No abdominal pain, constipation, diarrhea, nausea, vomiting or rectal bleeding.   URINARY: No dysuria, hematuria, or burning on  PA submitted via covermymeds  KEY:[id number]  Ozempic (0.25 or 0.5 MG/DOSE) 2MG/3ML pen-injectors   urination.  REPRODUCTIVE: See HPI.   BREASTS: Denies pain, lumps, or nipple discharge.   HEMATOLOGIC: No easy bruisability or excessive bleeding.   MUSCULOSKELETAL: Denies joint pain or swelling.   NEUROLOGIC: Denies syncope or weakness.   PSYCHIATRIC: Denies depression, anxiety or mood swings.    PE:   APPEARANCE: Well nourished, well developed, Black or  female in no acute distress.  NODES: no cervical, supraclavicular, or inguinal lymphadenopathy  BREASTS: Symmetrical, no skin changes or visible lesions. No palpable masses, nipple discharge or adenopathy bilaterally.  ABDOMEN: Soft. No tenderness or masses. No distention. No hernias palpated. No CVA tenderness.  VULVA: No lesions. Normal external female genitalia.  URETHRAL MEATUS: Normal size and location, no lesions, no prolapse.  URETHRA: No masses, tenderness, or prolapse.  VAGINA: Moist. No lesions or lacerations noted. No abnormal discharge present. No odor present.   CERVIX: No lesions or discharge. No cervical motion tenderness.   UTERUS: Normal size, regular shape, mobile, non-tender.  ADNEXA: No tenderness. No fullness or masses palpated in the adnexal regions.   ANUS PERINEUM: Normal.      Diagnosis:  1. Well woman exam with routine gynecological exam    2. Pap smear for cervical cancer screening    3. Birth control counseling    4. Weight gain    5. Encounter for initial prescription of transdermal patch hormonal contraceptive device        Plan:     Orders Placed This Encounter    TSH    T4, FREE    Hemoglobin A1C    Liquid-Based Pap Smear, Screening    norelgestromin-ethinyl estradiol 150-35 mcg/24 hr     Pap updated  S/p Gardasil- encouraged partner to check vaccination status  Declines std screening  Mammogram at age 38  Labs today, encouraged diet and exercise  Rx Ree    Patient was counseled today on the new ACS guidelines for cervical cytology screening as well as the current recommendations for breast cancer screening. She  was counseled to follow up with her PCP for other routine health maintenance. Counseling session lasted approximately 10 minutes, and all her questions were answered.  For women over the age of 65, you can stop having cervical cancer screenings if you have never had abnormal cervical cells or cervical cancer, and youve had three negative Pap tests in a row. (You also can stop screening if youve had two negative Pap and HPV tests in a row in the past 10 years, with at least one test in the past 5 years.),    Follow-up with me in 1 year for routine exam; pap in 3 years.     I spent a total of  minutes on the day of the visit.This includes face to face time and non-face to face time preparing to see the patient (eg, review of tests), obtaining and/or reviewing separately obtained history, documenting clinical information in the electronic or other health record, independently interpreting results and communicating results to the patient/family/caregiver, or care coordinator.    As of April 1, 2021, the Cures Act has been passed nationally. This new law requires that all doctors progress notes, lab results, pathology reports and radiology reports be released IMMEDIATELY to the patient in the patient portal. That means that the results are released to you at the EXACT same time they are released to me. Therefore, with all of the patients that I have I am not able to reply to each patient exactly when the results come in. So there will be a delay from when you see the results to when I see them and have time to come up with a response to send you. Also I only see these results when I am on the computer at work. So if the results come in over the weekend or after 5 pm of a work day, I will not see them until the next business day. As you can tell, this is a challenge as a provider to give every patient the quick response they hope for and deserve. So please be patient!     Thanks for your understanding and patience.

## 2023-05-19 NOTE — TELEPHONE ENCOUNTER
Spoke with patient , informed patient of results for his medication coverage. Patient verbalized good understanding.

## 2023-05-23 DIAGNOSIS — G47.09 OTHER INSOMNIA: ICD-10-CM

## 2023-05-23 RX ORDER — QUETIAPINE FUMARATE 100 MG/1
100 TABLET, FILM COATED ORAL NIGHTLY
Qty: 90 TABLET | Refills: 0 | Status: SHIPPED | OUTPATIENT
Start: 2023-05-23

## 2023-06-22 PROBLEM — E66.9 OBESITY (BMI 30-39.9): Status: ACTIVE | Noted: 2023-06-22

## 2023-06-22 PROBLEM — Z00.00 ENCOUNTER FOR HEALTH MAINTENANCE EXAMINATION: Status: ACTIVE | Noted: 2023-06-22

## 2023-09-05 ENCOUNTER — OFFICE VISIT (OUTPATIENT)
Dept: INTERNAL MEDICINE | Facility: CLINIC | Age: 28
End: 2023-09-05
Payer: COMMERCIAL

## 2023-09-05 VITALS
SYSTOLIC BLOOD PRESSURE: 128 MMHG | TEMPERATURE: 99.3 F | RESPIRATION RATE: 18 BRPM | HEART RATE: 76 BPM | WEIGHT: 291.4 LBS | DIASTOLIC BLOOD PRESSURE: 74 MMHG | BODY MASS INDEX: 39.52 KG/M2

## 2023-09-05 DIAGNOSIS — J06.9 VIRAL URI: Primary | ICD-10-CM

## 2023-09-05 LAB
EXPIRATION DATE: NORMAL
EXPIRATION DATE: NORMAL
FLUAV AG NPH QL: NEGATIVE
FLUBV AG NPH QL: NEGATIVE
INTERNAL CONTROL: NORMAL
Lab: NORMAL
Lab: NORMAL
RSV AG SPEC QL: NEGATIVE
SARS-COV-2 RNA NOSE QL NAA+PROBE: NOT DETECTED

## 2023-09-05 PROCEDURE — 87635 SARS-COV-2 COVID-19 AMP PRB: CPT | Performed by: STUDENT IN AN ORGANIZED HEALTH CARE EDUCATION/TRAINING PROGRAM

## 2023-09-05 NOTE — PROGRESS NOTES
Acute Office Visit      Date: 2023   Patient Name: Estuardo Crooks  : 1995   MRN: [id number]     Chief Complaint:    Chief Complaint   Patient presents with    Fever     Body ache         History of Present Illness: Estuardo Crooks is a 28 y.o. male who presents to the clinic today for fever and body aches.     Fever with body aches   The patients reports that he has been experiencing symptoms since 2023. He states that his wife keep waking him up due to diaphoresis. He notes that he woke up on 9/3/2023 in which he felt rough. The patient reports that he took Advil and Dayquil on 9/3/2023 in which he felt better. He states that his fever was 101.6 degrees Fahrenheit on 2023. He reports that he has muscles aches only when fever is present. The patient denies any chest pain, but noticed that he was winded while on the phone yesterday. He denies any congestion, drainage, ear and throat pain. The patient states that he has an appetite when he is feeling fine.       Subjective      Review of Systems:   Review of Systems   Constitutional:  Positive for fever.        Body aches     I have reviewed the patients family history, social history, past medical history, past surgical history and have updated it as appropriate.     Medications:     Current Outpatient Medications:     QUEtiapine (SEROquel) 100 MG tablet, Take 1 tablet by mouth Every Night., Disp: 90 tablet, Rfl: 0    sertraline (ZOLOFT) 100 MG tablet, Take 1 tablet by mouth Daily., Disp: 90 tablet, Rfl: 0    Allergies:   No Known Allergies    Objective     Physical Exam: Please see above  Vital Signs:   Vitals:    23 0946   BP: 128/74   BP Location: Right arm   Patient Position: Sitting   Cuff Size: Adult   Pulse: 76   Resp: 18   Temp: 99.3 °F (37.4 °C)   TempSrc: Temporal   Weight: 132 kg (291 lb 6.4 oz)   PainSc: 0-No pain     Body mass index is 39.52 kg/m².    Physical Exam  Vitals and nursing note reviewed.   Constitutional:        General: He is not in acute distress.     Appearance: Normal appearance. He is normal weight. He is not ill-appearing or toxic-appearing.   HENT:      Nose: No congestion or rhinorrhea.   Eyes:      General:         Right eye: No discharge.         Left eye: No discharge.      Conjunctiva/sclera: Conjunctivae normal.   Cardiovascular:      Rate and Rhythm: Normal rate and regular rhythm.      Heart sounds: Normal heart sounds. No murmur heard.    No friction rub.   Pulmonary:      Effort: Pulmonary effort is normal. No respiratory distress.      Breath sounds: Normal breath sounds. No wheezing or rhonchi.   Abdominal:      General: Abdomen is flat. Bowel sounds are normal. There is no distension.      Palpations: Abdomen is soft. There is no mass.      Tenderness: There is no abdominal tenderness. There is no guarding or rebound.   Musculoskeletal:      Cervical back: Normal range of motion.      Right lower leg: No edema.      Left lower leg: No edema.   Skin:     Findings: No lesion or rash.   Neurological:      General: No focal deficit present.      Mental Status: He is alert. Mental status is at baseline.      Coordination: Coordination normal.      Gait: Gait normal.   Psychiatric:         Mood and Affect: Mood normal.         Behavior: Behavior normal.         Thought Content: Thought content normal.         Judgment: Judgment normal.       Procedures    Results:   Labs:   Hemoglobin A1C   Date Value Ref Range Status   07/20/2023 5.3 % Final     TSH   Date Value Ref Range Status   06/22/2023 1.290 0.270 - 4.200 uIU/mL Final        Imaging:   No valid procedures specified.     Assessment / Plan      Assessment/Plan:   Problem List Items Addressed This Visit    None  Visit Diagnoses       Viral URI    -  Primary    Relevant Orders    COVID-19 PCR, [facility], NP SWAB IN [facility] VIRAL TRANSPORT MEDIA/ORAL SWISH 24-30 HR TAT - Swab, Nasopharynx (Completed)    POC Influenza A / B (Completed)    POC Respiratory  Syncytial Virus (Completed)            1. Viral URI    -  Primary  - Discussed treatment plans and precautions  - Will obtain viral testing to further evaluate   - The patient was advised of further protocol within 24 hours for general precaution   - He was recommended to continue Tylenol and ibuprofen every 6 hours in rotation  - The patient was advised to stay hydrated    - If symptoms continue, the patient will follow up  - COVID-19 PCR, [facility], NP SWAB IN [facility] VIRAL TRANSPORT MEDIA/ORAL SWISH 24-30 HR TAT - Swab, Nasopharynx  - POC Influenza A / B  - POC Respiratory Syncytial Virus    2. Healthcare maintenance  - The patient will follow up in 6/2024 for maintenance    Follow Up:   Return in about 9 months (around 6/10/2024) for Annual.      Abdulkadir Murrieta MD  [facility]  Transcribed from ambient dictation for Abdulkadir Murrieta MD by Kady Ling.  09/05/23   11:15 EDT    Patient or patient representative verbalized consent to the visit recording.  I have personally performed the services described in this document as transcribed by the above individual, and it is both accurate and complete.

## 2023-09-06 ENCOUNTER — TELEPHONE (OUTPATIENT)
Dept: INTERNAL MEDICINE | Facility: CLINIC | Age: 28
End: 2023-09-06
Payer: COMMERCIAL

## 2023-09-06 NOTE — TELEPHONE ENCOUNTER
Caller: Estuardo Crooks    Relationship: Self    Best call back number: [phone]     What medication are you requesting: ANTIBIOTICS    What are your current symptoms: FEVER, BODY ACHES    How long have you been experiencing symptoms: 09/01/23    Have you had these symptoms before:    [] Yes  [x] No    Have you been treated for these symptoms before:   [] Yes  [x] No    If a prescription is needed, what is your preferred pharmacy and phone number: [facility] #[id number] - [address] AT [facility] & [address] - [phone] [facility] [phone]      Additional notes: PATIENT WAS SEEN BY DR ORTIZ ON 09/05/23 AND WAS TESTED FOR COVID, FLU AND RSV AND ALL WERE NEGATIVE.

## 2023-09-07 ENCOUNTER — TELEPHONE (OUTPATIENT)
Dept: INTERNAL MEDICINE | Facility: CLINIC | Age: 28
End: 2023-09-07
Payer: COMMERCIAL

## 2023-09-07 ENCOUNTER — LAB (OUTPATIENT)
Dept: INTERNAL MEDICINE | Facility: CLINIC | Age: 28
End: 2023-09-07
Payer: COMMERCIAL

## 2023-09-07 ENCOUNTER — HOSPITAL ENCOUNTER (OUTPATIENT)
Dept: GENERAL RADIOLOGY | Facility: HOSPITAL | Age: 28
Discharge: HOME | End: 2023-09-07
Admitting: STUDENT IN AN ORGANIZED HEALTH CARE EDUCATION/TRAINING PROGRAM
Payer: COMMERCIAL

## 2023-09-07 DIAGNOSIS — M79.10 MYALGIA: Primary | ICD-10-CM

## 2023-09-07 DIAGNOSIS — M79.10 MYALGIA: ICD-10-CM

## 2023-09-07 DIAGNOSIS — R50.9 RECENT UNEXPLAINED FEVER: ICD-10-CM

## 2023-09-07 LAB
ALBUMIN SERPL-MCNC: 4.3 G/DL (ref 3.5–5.2)
ALBUMIN/GLOB SERPL: 1.3 G/DL
ALP SERPL-CCNC: 66 U/L (ref 39–117)
ALT SERPL W P-5'-P-CCNC: 67 U/L (ref 1–41)
ANION GAP SERPL CALCULATED.3IONS-SCNC: 12 MMOL/L (ref 5–15)
AST SERPL-CCNC: 39 U/L (ref 1–40)
BASOPHILS # BLD AUTO: 0.03 10*3/MM3 (ref 0–0.2)
BASOPHILS NFR BLD AUTO: 0.5 % (ref 0–1.5)
BILIRUB SERPL-MCNC: 0.3 MG/DL (ref 0–1.2)
BILIRUB UR QL STRIP: NEGATIVE
BUN SERPL-MCNC: 13 MG/DL (ref 6–20)
BUN/CREAT SERPL: 17.1 (ref 7–25)
CALCIUM SPEC-SCNC: 9.7 MG/DL (ref 8.6–10.5)
CHLORIDE SERPL-SCNC: 104 MMOL/L (ref 98–107)
CLARITY UR: ABNORMAL
CO2 SERPL-SCNC: 23 MMOL/L (ref 22–29)
COLOR UR: YELLOW
CREAT SERPL-MCNC: 0.76 MG/DL (ref 0.76–1.27)
CRP SERPL-MCNC: 8.3 MG/DL (ref 0–0.5)
DEPRECATED RDW RBC AUTO: 38.9 FL (ref 37–54)
EGFRCR SERPLBLD CKD-EPI 2021: 124 ML/MIN/1.73
EOSINOPHIL # BLD AUTO: 0.13 10*3/MM3 (ref 0–0.4)
EOSINOPHIL NFR BLD AUTO: 2.2 % (ref 0.3–6.2)
ERYTHROCYTE [DISTWIDTH] IN BLOOD BY AUTOMATED COUNT: 12 % (ref 12.3–15.4)
GLOBULIN UR ELPH-MCNC: 3.2 GM/DL
GLUCOSE SERPL-MCNC: 107 MG/DL (ref 65–99)
GLUCOSE UR STRIP-MCNC: NEGATIVE MG/DL
HCT VFR BLD AUTO: 42.1 % (ref 37.5–51)
HGB BLD-MCNC: 14.7 G/DL (ref 13–17.7)
HGB UR QL STRIP.AUTO: NEGATIVE
IMM GRANULOCYTES # BLD AUTO: 0.02 10*3/MM3 (ref 0–0.05)
IMM GRANULOCYTES NFR BLD AUTO: 0.3 % (ref 0–0.5)
KETONES UR QL STRIP: NEGATIVE
LEUKOCYTE ESTERASE UR QL STRIP.AUTO: NEGATIVE
LYMPHOCYTES # BLD AUTO: 1.15 10*3/MM3 (ref 0.7–3.1)
LYMPHOCYTES NFR BLD AUTO: 19.6 % (ref 19.6–45.3)
MCH RBC QN AUTO: 31.3 PG (ref 26.6–33)
MCHC RBC AUTO-ENTMCNC: 34.9 G/DL (ref 31.5–35.7)
MCV RBC AUTO: 89.8 FL (ref 79–97)
MONOCYTES # BLD AUTO: 0.7 10*3/MM3 (ref 0.1–0.9)
MONOCYTES NFR BLD AUTO: 11.9 % (ref 5–12)
NEUTROPHILS NFR BLD AUTO: 3.84 10*3/MM3 (ref 1.7–7)
NEUTROPHILS NFR BLD AUTO: 65.5 % (ref 42.7–76)
NITRITE UR QL STRIP: NEGATIVE
NRBC BLD AUTO-RTO: 0 /100 WBC (ref 0–0.2)
PH UR STRIP.AUTO: 6 [PH] (ref 5–8)
PLATELET # BLD AUTO: 223 10*3/MM3 (ref 140–450)
PMV BLD AUTO: 9.6 FL (ref 6–12)
POTASSIUM SERPL-SCNC: 4.2 MMOL/L (ref 3.5–5.2)
PROCALCITONIN SERPL-MCNC: 0.18 NG/ML (ref 0–0.25)
PROT SERPL-MCNC: 7.5 G/DL (ref 6–8.5)
PROT UR QL STRIP: ABNORMAL
RBC # BLD AUTO: 4.69 10*6/MM3 (ref 4.14–5.8)
SODIUM SERPL-SCNC: 139 MMOL/L (ref 136–145)
SP GR UR STRIP: >=1.03 (ref 1–1.03)
UROBILINOGEN UR QL STRIP: ABNORMAL
WBC NRBC COR # BLD: 5.87 10*3/MM3 (ref 3.4–10.8)

## 2023-09-07 PROCEDURE — 84145 PROCALCITONIN (PCT): CPT | Performed by: STUDENT IN AN ORGANIZED HEALTH CARE EDUCATION/TRAINING PROGRAM

## 2023-09-07 PROCEDURE — 87086 URINE CULTURE/COLONY COUNT: CPT | Performed by: STUDENT IN AN ORGANIZED HEALTH CARE EDUCATION/TRAINING PROGRAM

## 2023-09-07 PROCEDURE — 71046 X-RAY EXAM CHEST 2 VIEWS: CPT

## 2023-09-07 PROCEDURE — 85025 COMPLETE CBC W/AUTO DIFF WBC: CPT | Performed by: STUDENT IN AN ORGANIZED HEALTH CARE EDUCATION/TRAINING PROGRAM

## 2023-09-07 PROCEDURE — 80053 COMPREHEN METABOLIC PANEL: CPT | Performed by: STUDENT IN AN ORGANIZED HEALTH CARE EDUCATION/TRAINING PROGRAM

## 2023-09-07 PROCEDURE — 86140 C-REACTIVE PROTEIN: CPT | Performed by: STUDENT IN AN ORGANIZED HEALTH CARE EDUCATION/TRAINING PROGRAM

## 2023-09-07 PROCEDURE — 81001 URINALYSIS AUTO W/SCOPE: CPT | Performed by: STUDENT IN AN ORGANIZED HEALTH CARE EDUCATION/TRAINING PROGRAM

## 2023-09-07 PROCEDURE — 87040 BLOOD CULTURE FOR BACTERIA: CPT | Performed by: STUDENT IN AN ORGANIZED HEALTH CARE EDUCATION/TRAINING PROGRAM

## 2023-09-07 NOTE — TELEPHONE ENCOUNTER
Patient with recurrent febrile episodes and myalgias without specified source.  Additional work-up ordered per protocol.

## 2023-09-07 NOTE — TELEPHONE ENCOUNTER
Pt called, has an appt tomorrow with Lit, but was hoping to be seen sooner as he needs to get back to work. There were no same day openings on any schedules today, was wondering if there was any way he could be worked in or wait listed for the rest of this afternoon.

## 2023-09-08 ENCOUNTER — TELEMEDICINE (OUTPATIENT)
Dept: INTERNAL MEDICINE | Facility: CLINIC | Age: 28
End: 2023-09-08
Payer: COMMERCIAL

## 2023-09-08 DIAGNOSIS — J06.9 VIRAL URI: Primary | ICD-10-CM

## 2023-09-08 LAB
BACTERIA UR QL AUTO: ABNORMAL /HPF
COD CRY URNS QL: ABNORMAL /HPF
HYALINE CASTS UR QL AUTO: ABNORMAL /LPF
RBC # UR STRIP: ABNORMAL /HPF
REF LAB TEST METHOD: ABNORMAL
SQUAMOUS #/AREA URNS HPF: ABNORMAL /HPF
WBC # UR STRIP: ABNORMAL /HPF

## 2023-09-08 PROCEDURE — 99213 OFFICE O/P EST LOW 20 MIN: CPT | Performed by: STUDENT IN AN ORGANIZED HEALTH CARE EDUCATION/TRAINING PROGRAM

## 2023-09-08 NOTE — PROGRESS NOTES
Telehealth E-Visit      Patient Name: Estuardo Crooks  : 1995   MRN: [id number]   10:40 EDT    Chief Complaint:  No chief complaint on file.      I have reviewed the E-Visit questionnaire and the patient's answers, my assessment and plan are listed below.     This provider is located at the [facility] (through [facility]), [address], [address] [place] [address] using a secure [employer] Video Visit through [employer]. Patient is being seen remotely via telehealth at their home address in [place], and stated they are in a secure environment for this session. The patient's condition being diagnosed/treated is appropriate for telemedicine. The provider identified herself as well as her credentials. The patient, and/or patients guardian, consent to be seen remotely, and when consent is given they understand that the consent allows for patient identifiable information to be sent to a third party as needed. They may refuse to be seen remotely at any time. The electronic data is encrypted and password protected, and the patient and/or guardian has been advised of the potential risks to privacy not withstanding such measures.    You have chosen to receive care through a telehealth visit. Do you consent to use a video/audio connection for your medical care today? Yes    History of Present Illness: Estuardo Crooks is a 28 y.o. male who is here today to follow-up.    Neck and Back Pain  He reports the onset of neck and back pain and soreness on 2023. He reports sleeping well on 2023. He reports fever and soreness upon waking on 2023. His fever was 99.7 degrees on 2023. He states that earlier this week his fever only slightly reduced after taking 4 to 5 Advil. He also reports occasional soreness of his upper extremities. He denies neck stiffness. He reports taking Advil prior to undergoing testing on 2023. He reports last  taking medication at 6 PM on 09/07/2023 and feeling significantly better afterwards.    Frequent Illness  He reports at one point becoming ill every 2 to 3 weeks since 2021. He reports being positive for strep in 2023. At that time he received both antibiotic and steroid injections. He was advised by SHAQ Funk to begin taking vitamins. He takes a One A Day Men's multivitamin and 1000 mg of vitamins C daily. He inquires if there are any other precautions he can take to prevent illness. He states he has not been ill for the past several months since beginning taking vitamins.      Subjective      Review of Systems:   Review of Systems   Constitutional:  Negative for activity change, appetite change, fatigue and fever.   Eyes:  Negative for blurred vision, photophobia and visual disturbance.   Respiratory:  Negative for cough, chest tightness and shortness of breath.    Cardiovascular:  Negative for chest pain and palpitations.   Gastrointestinal:  Negative for abdominal distention, abdominal pain, blood in stool, constipation, diarrhea, nausea and vomiting.   Genitourinary:  Negative for dysuria and hematuria.   Musculoskeletal:  Positive for back pain and neck pain. Negative for arthralgias and joint swelling.   Skin:  Negative for rash and wound.   Neurological:  Negative for weakness, headache and confusion.     I have reviewed and the following portions of the patient's history were updated as appropriate: past family history, past medical history, past social history, past surgical history and problem list.    Medications:     Current Outpatient Medications:     QUEtiapine (SEROquel) 100 MG tablet, Take 1 tablet by mouth Every Night., Disp: 90 tablet, Rfl: 0    sertraline (ZOLOFT) 100 MG tablet, Take 1 tablet by mouth Daily., Disp: 90 tablet, Rfl: 0    Allergies:   No Known Allergies    Objective     Physical Exam:  Vital Signs: There were no vitals filed for this visit.  There is no height or weight on  file to calculate BMI.    Physical Exam  Vitals and nursing note reviewed.   Constitutional:       General: He is not in acute distress.     Appearance: Normal appearance. He is not ill-appearing or toxic-appearing.   Eyes:      General:         Right eye: No discharge.         Left eye: No discharge.      Conjunctiva/sclera: Conjunctivae normal.   Pulmonary:      Effort: Pulmonary effort is normal. No respiratory distress.   Musculoskeletal:      Cervical back: Normal range of motion.   Skin:     Coloration: Skin is not jaundiced.      Findings: No rash.   Neurological:      General: No focal deficit present.      Mental Status: He is alert. Mental status is at baseline.      Coordination: Coordination normal.      Gait: Gait normal.   Psychiatric:         Mood and Affect: Mood normal.         Behavior: Behavior normal.         Thought Content: Thought content normal.         Judgment: Judgment normal.         Assessment / Plan      Assessment/Plan:   Diagnoses and all orders for this visit:    1. Viral URI (Primary)  Assessment & Plan:  He recently had a normal chest x-ray.  His recent urinalysis was slightly cloudy, positive for protein, negative for infection, leukocytes, and nitrites. Urinalysis reveals 1+ bacteria, and squamous skin cells. Urine culture is negative for growth. His pro-Karri was normal, CRP was 8.5 mg/dL. His ALT liver enzyme was slightly elevated. His CBC was normal. His blood culture is pending result.  He was negative for influenza.  Advised to take 2 Tylenol and 2 ibuprofen every 6 hours for 2 days. Take as needed after 2 days.         Follow Up:   Return in about 9 months (around 6/5/2024) for Next scheduled follow up.    Any medications prescribed have been sent electronically to   [id number] - [place], [place] - [address] [place] [address] AT [facility] & [place] - [phone]  - [phone] [address] [address]  Phone: [phone] Fax:  [phone]          Transcribed from ambient dictation for Abdulkadir Murrieta MD by Rohit Garibay.  09/08/23   13:21 EDT    Patient or patient representative verbalized consent to the visit recording.  I have personally performed the services described in this document as transcribed by the above individual, and it is both accurate and complete.    Abdulkadir Murrieta MD  [facility]   09/11/23  10:40 EDT

## 2023-09-08 NOTE — ASSESSMENT & PLAN NOTE
He recently had a normal chest x-ray.  His recent urinalysis was slightly cloudy, positive for protein, negative for infection, leukocytes, and nitrites. Urinalysis reveals 1+ bacteria, and squamous skin cells. Urine culture is negative for growth. His pro-Karri was normal, CRP was 8.5 mg/dL. His ALT liver enzyme was slightly elevated. His CBC was normal. His blood culture is pending result.  He was negative for influenza.  Advised to take 2 Tylenol and 2 ibuprofen every 6 hours for 2 days. Take as needed after 2 days.

## 2023-09-09 LAB — BACTERIA SPEC AEROBE CULT: NO GROWTH

## 2023-09-12 LAB — BACTERIA SPEC AEROBE CULT: NORMAL

## 2023-10-31 ENCOUNTER — OFFICE VISIT (OUTPATIENT)
Dept: INTERNAL MEDICINE | Facility: CLINIC | Age: 28
End: 2023-10-31
Payer: COMMERCIAL

## 2023-10-31 VITALS
HEIGHT: 72 IN | WEIGHT: 299 LBS | DIASTOLIC BLOOD PRESSURE: 76 MMHG | SYSTOLIC BLOOD PRESSURE: 120 MMHG | HEART RATE: 80 BPM | BODY MASS INDEX: 40.5 KG/M2 | TEMPERATURE: 97.3 F | RESPIRATION RATE: 18 BRPM

## 2023-10-31 DIAGNOSIS — G47.09 OTHER INSOMNIA: ICD-10-CM

## 2023-10-31 DIAGNOSIS — E78.5 HYPERLIPIDEMIA, UNSPECIFIED HYPERLIPIDEMIA TYPE: ICD-10-CM

## 2023-10-31 DIAGNOSIS — F41.9 ANXIETY: ICD-10-CM

## 2023-10-31 DIAGNOSIS — F32.9 MAJOR DEPRESSIVE DISORDER WITH SINGLE EPISODE, REMISSION STATUS UNSPECIFIED: ICD-10-CM

## 2023-10-31 DIAGNOSIS — E66.01 CLASS 3 SEVERE OBESITY DUE TO EXCESS CALORIES WITH SERIOUS COMORBIDITY AND BODY MASS INDEX (BMI) OF 40.0 TO 44.9 IN ADULT: Primary | ICD-10-CM

## 2023-10-31 DIAGNOSIS — F43.21 GRIEF: ICD-10-CM

## 2023-10-31 PROCEDURE — 99213 OFFICE O/P EST LOW 20 MIN: CPT | Performed by: NURSE PRACTITIONER

## 2023-10-31 RX ORDER — QUETIAPINE FUMARATE 100 MG/1
100 TABLET, FILM COATED ORAL NIGHTLY
Qty: 90 TABLET | Refills: 0 | Status: SHIPPED | OUTPATIENT
Start: 2023-10-31

## 2023-10-31 RX ORDER — SERTRALINE HYDROCHLORIDE 100 MG/1
100 TABLET, FILM COATED ORAL DAILY
Qty: 90 TABLET | Refills: 0 | Status: SHIPPED | OUTPATIENT
Start: 2023-10-31

## 2023-10-31 NOTE — PATIENT INSTRUCTIONS
MyPlate from USDA    MyPlate is an outline of a general healthy diet based on the Dietary Guidelines for Americans, 3403-7862, from the U.S. Department of Agriculture (USDA). It sets guidelines for how much food you should eat from each food group based on your age, sex, and level of physical activity.  What are tips for following MyPlate?  To follow MyPlate recommendations:  Eat a wide variety of fruits and vegetables, grains, and protein foods.  Serve smaller portions and eat less food throughout the day.  Limit portion sizes to avoid overeating.  Enjoy your food.  Get at least 150 minutes of exercise every week. This is about 30 minutes each day, 5 or more days per week.  It can be difficult to have every meal look like MyPlate. Think about MyPlate as eating guidelines for an entire day, rather than each individual meal.  Fruits and vegetables  Make one half of your plate fruits and vegetables.  Eat many different colors of fruits and vegetables each day.  For a 2,000-calorie daily food plan, eat:  2½ cups of vegetables every day.  2 cups of fruit every day.  1 cup is equal to:  1 cup raw or cooked vegetables.  1 cup raw fruit.  1 medium-sized orange, apple, or banana.  1 cup 100% fruit or vegetable juice.  2 cups raw leafy greens, such as lettuce, spinach, or kale.  ½ cup dried fruit.  Grains  One fourth of your plate should be grains.  Make at least half of the grains you eat each day whole grains.  For a 2,000-calorie daily food plan, eat 6 oz of grains every day.  1 oz is equal to:  1 slice bread.  1 cup cereal.  ½ cup cooked rice, cereal, or pasta.  Protein  One fourth of your plate should be protein.  Eat a wide variety of protein foods, including meat, poultry, fish, eggs, beans, nuts, and tofu.  For a 2,000-calorie daily food plan, eat 5½ oz of protein every day.  1 oz is equal to:  1 oz meat, poultry, or fish.  ¼ cup cooked beans.  1 egg.  ½ oz nuts or seeds.  1 Tbsp peanut butter.  Dairy  Drink fat-free  or low-fat (1%) milk.  Eat or drink dairy as a side to meals.  For a 2,000-calorie daily food plan, eat or drink 3 cups of dairy every day.  1 cup is equal to:  1 cup milk, yogurt, cottage cheese, or soy milk (soy beverage).  2 oz processed cheese.  1½ oz natural cheese.  Fats, oils, salt, and sugars  Only small amounts of oils are recommended.  Avoid foods that are high in calories and low in nutritional value (empty calories), like foods high in fat or added sugars.  Choose foods that are low in salt (sodium). Choose foods that have less than 140 milligrams (mg) of sodium per serving.  Drink water instead of sugary drinks. Drink enough fluid to keep your urine pale yellow.  Where to find support  Work with your health care provider or a dietitian to develop a customized eating plan that is right for you.  Download an rainer (mobile application) to help you track your daily food intake.  Where to find more information  USDA: ChooseMyPlate.gov  Summary  MyPlate is a general guideline for healthy eating from the USDA. It is based on the Dietary Guidelines for Americans, 7428-9749.  In general, fruits and vegetables should take up one half of your plate, grains should take up one fourth of your plate, and protein should take up one fourth of your plate.  This information is not intended to replace advice given to you by your health care provider. Make sure you discuss any questions you have with your health care provider.  Document Revised: 11/08/2021 Document Reviewed: 11/08/2021  [employer] Patient Education © 2023 [employer]

## 2023-10-31 NOTE — PROGRESS NOTES
Chief Complaint  Weight Loss (Patient wants to discuss weight loss options)  Subjective          Estuardo Crooks presents to [facility]  HPI    The patient presents today for weight loss treatment.    Obesity   His current BMI is 40.5. The patient reports that [facility] insurance will not cover the cost of Ozempic or Wegovy. He reports that it may be beneficial to pay out of pocket for Ozempic or Wegovy. However, he reports that the medications are too expensive. He is possibly interested in bariatric surgery. He reports completing paperwork from [facility]'s website and was recommended to call his insurance and see if the cost of surgery would be covered. The patient was advised by his insurance company that the bariatric surgery cost will not be covered. The patient is not able to enroll in a new medical insurance plan 01/2024 because he is currently part of a group plan. He was recommended by his insurance company to ask specific questions today for bariatric surgery requirement and evaluation. The patient asked if surgery is considered medically necessary for his current condition of obesity. He denies that the insurance company will cover the cost even when medically necessary. He was advised to ask questions 23, 25, and 27 - 29 on the form and if surgery is not considered medically necessary.     Major depressive disorder with single episode  The patient reports needing refills for Seroquel and Zoloft.            Current Outpatient Medications:     QUEtiapine (SEROquel) 100 MG tablet, Take 1 tablet by mouth Every Night., Disp: 90 tablet, Rfl: 0    sertraline (ZOLOFT) 100 MG tablet, Take 1 tablet by mouth Daily., Disp: 90 tablet, Rfl: 0         Objective   Vital Signs:   /76 (BP Location: Left arm, Patient Position: Sitting, Cuff Size: Large Adult)   Pulse 80   Temp 97.3 °F (36.3 °C) (Infrared)   Resp 18   Ht 182.9 cm  "(72\")   Wt 136 kg (299 lb)   BMI 40.55 kg/m²     Physical Exam  Vitals and nursing note reviewed.   Constitutional:       General: He is not in acute distress.     Appearance: Normal appearance. He is well-developed. He is not ill-appearing.   HENT:      Head: Normocephalic and atraumatic.   Eyes:      General: No scleral icterus.  Neck:      Thyroid: No thyromegaly.   Cardiovascular:      Rate and Rhythm: Normal rate and regular rhythm.   Pulmonary:      Effort: Pulmonary effort is normal.      Breath sounds: Normal breath sounds.   Abdominal:      General: Bowel sounds are normal. There is no distension.      Palpations: Abdomen is soft.      Tenderness: There is no abdominal tenderness.   Lymphadenopathy:      Cervical: No cervical adenopathy.   Skin:     Capillary Refill: Capillary refill takes 2 to 3 seconds.      Coloration: Skin is not pale.   Neurological:      Mental Status: He is alert and oriented to person, place, and time.   Psychiatric:         Mood and Affect: Mood normal.         Behavior: Behavior normal.        Result Review :                 Assessment and Plan    Diagnoses and all orders for this visit:    1. Class 3 severe obesity due to excess calories with serious comorbidity and body mass index (BMI) of 40.0 to 44.9 in adult (Primary)  -     Ambulatory Referral to Bariatric Surgery    2. Major depressive disorder with single episode, remission status unspecified    3. Hyperlipidemia, unspecified hyperlipidemia type    4. Other insomnia  -     QUEtiapine (SEROquel) 100 MG tablet; Take 1 tablet by mouth Every Night.  Dispense: 90 tablet; Refill: 0    5. Anxiety  -     sertraline (ZOLOFT) 100 MG tablet; Take 1 tablet by mouth Daily.  Dispense: 90 tablet; Refill: 0    6. Grief  -     sertraline (ZOLOFT) 100 MG tablet; Take 1 tablet by mouth Daily.  Dispense: 90 tablet; Refill: 0    Obesity  - He is recommended to see some weight loss centers in town and try to get medication through these " facilities instead of paying out of pocket.   - The patient will be referred to bariatric surgery for a consultation. He prefers [facility].    - I discussed with the patient typical surgery time, risks, benefits, and recovery expectations for bariatric surgery.     Major depressive disorder with single episode  - The patient has requested refills for Seroquel and Zoloft. He has requested that medications orders be sent to [facility] instead of [facility].       Follow Up   Return if symptoms worsen or fail to improve.  Patient was given instructions and counseling regarding his condition or for health maintenance advice. Please see specific information pulled into the AVS if appropriate.     RTC/call  If symptoms worsen  Meds MOA and SE's reviewed and pt v/u    SHAQ Hirsch [facility]  [address] [place] [id number]  Fax [phone]  Phone [phone]      Transcribed from ambient dictation for SHAQ Hirsch by Chloe SELF.  10/31/23   11:33 EDT    Patient or patient representative verbalized consent to the visit recording.  I have personally performed the services described in this document as transcribed by the above individual, and it is both accurate and complete.

## 2023-11-10 RX ORDER — MELATONIN
1000 DAILY
COMMUNITY

## 2023-11-10 RX ORDER — MULTIPLE VITAMINS W/ MINERALS TAB 9MG-400MCG
1 TAB ORAL DAILY
COMMUNITY

## 2023-11-14 ENCOUNTER — OFFICE VISIT (OUTPATIENT)
Dept: BEHAVIORAL HEALTH | Facility: CLINIC | Age: 28
End: 2023-11-14

## 2023-11-14 ENCOUNTER — OFFICE VISIT (OUTPATIENT)
Dept: BARIATRICS/WEIGHT MGMT | Facility: CLINIC | Age: 28
End: 2023-11-14

## 2023-11-14 ENCOUNTER — LAB (OUTPATIENT)
Dept: LAB | Facility: HOSPITAL | Age: 28
End: 2023-11-14
Payer: COMMERCIAL

## 2023-11-14 ENCOUNTER — DOCUMENTATION (OUTPATIENT)
Dept: BARIATRICS/WEIGHT MGMT | Facility: CLINIC | Age: 28
End: 2023-11-14
Payer: COMMERCIAL

## 2023-11-14 VITALS
RESPIRATION RATE: 18 BRPM | BODY MASS INDEX: 40.09 KG/M2 | SYSTOLIC BLOOD PRESSURE: 142 MMHG | DIASTOLIC BLOOD PRESSURE: 86 MMHG | HEIGHT: 72 IN | TEMPERATURE: 97.7 F | HEART RATE: 97 BPM | OXYGEN SATURATION: 98 % | WEIGHT: 296 LBS

## 2023-11-14 DIAGNOSIS — R53.83 FATIGUE, UNSPECIFIED TYPE: ICD-10-CM

## 2023-11-14 DIAGNOSIS — Z71.89 ENCOUNTER FOR PSYCHOLOGICAL ASSESSMENT PRIOR TO BARIATRIC SURGERY: ICD-10-CM

## 2023-11-14 DIAGNOSIS — K21.9 GASTROESOPHAGEAL REFLUX DISEASE, UNSPECIFIED WHETHER ESOPHAGITIS PRESENT: ICD-10-CM

## 2023-11-14 DIAGNOSIS — Z63.4 DEATH OF PARENT: ICD-10-CM

## 2023-11-14 DIAGNOSIS — E78.5 HYPERLIPIDEMIA, UNSPECIFIED HYPERLIPIDEMIA TYPE: ICD-10-CM

## 2023-11-14 DIAGNOSIS — E66.01 OBESITY, CLASS III, BMI 40-49.9 (MORBID OBESITY): ICD-10-CM

## 2023-11-14 DIAGNOSIS — E66.01 OBESITY, CLASS III, BMI 40-49.9 (MORBID OBESITY): Primary | ICD-10-CM

## 2023-11-14 DIAGNOSIS — F32.A DEPRESSION, UNSPECIFIED DEPRESSION TYPE: ICD-10-CM

## 2023-11-14 DIAGNOSIS — E66.01 MORBID OBESITY WITH BMI OF 40.0-44.9, ADULT: Primary | ICD-10-CM

## 2023-11-14 PROBLEM — F41.9 ANXIETY: Status: ACTIVE | Noted: 2023-11-14

## 2023-11-14 PROBLEM — R06.83 SNORING: Status: ACTIVE | Noted: 2023-11-14

## 2023-11-14 PROBLEM — R07.9 CHEST PAIN: Status: ACTIVE | Noted: 2023-11-14

## 2023-11-14 PROBLEM — Z87.891 FORMER SMOKER: Status: ACTIVE | Noted: 2023-11-14

## 2023-11-14 PROCEDURE — 99214 OFFICE O/P EST MOD 30 MIN: CPT | Performed by: PHYSICIAN ASSISTANT

## 2023-11-14 PROCEDURE — 83013 H PYLORI (C-13) BREATH: CPT

## 2023-11-14 SDOH — SOCIAL STABILITY - SOCIAL INSECURITY: DISSAPEARANCE AND DEATH OF FAMILY MEMBER: Z63.4

## 2023-11-14 NOTE — PROGRESS NOTES
PROGRESS NOTE    Data:    Estuardo Crooks is a 28 y.o. male who met with the undersigned for a scheduled psychological evaluation from 12:45 - 1:30 pm.      Clinical Maneuvering/Intervention:      Chief complaint and history of presenting illness/Problems: struggling with obesity for several years. Despite trying different weight loss plans and diets, the pt reported being unsuccessful in losing weight. A psychological evaluation was conducted in order to assess past and current level of functioning. Areas assessed included, but were not limited to: perception of social support, perception of ability to face and deal with challenges in life (positive functioning), anxiety symptoms, depressive symptoms, perspective on beliefs/belief system, coping skills for stress, intelligence level, addiction issues, etc. Therapeutic rapport was established. Interventions conducted today were geared towards assessing the pt's readiness for weight loss surgery and identifying and psychological contraindications for undergoing such a major life change. Social support was deemed strong (specific to weight loss surgery/weight loss in this manner and in a general sense): wife, co-workers, friends, and several family members. Current psychological struggles were described as low, but included sadness over the loss of his father in a plane crash in 2021.  He talked about how, with support by loved ones, he is healing from the loss, however he feels like being overweight is something that continues to hold him back from living his life. He has become progressively down when it comes to being overweight. Coping skills for distress and related to undergoing a major life change such as weight loss surgery/weight loss were deemed strong and included strong work ethic, leadership skills, relies on his strong social support system, intelligent, good sense of humor, follows directions well, responsible person, maintains quality relationships  with others, and believes in himself that he will be successful with weight loss surgery. The pt endorsed having characteristics of readiness to undergo major life changes inherent in the journey of weight loss surgery. The pt could speak to the detailed process of becoming ready mentally for this major life change, having 'suffered' enough, and that he feels confident in his decision to have weight loss surgery. The pt expressed gratitude for today's visit.     Past Family and Social History:      History of family mental health problems: sister (substance abuse)     Psychosocial history: treatment of psychiatric care in the past (N/A), alcohol/substance abuse treatment in the past (N/A) , alcohol/substance abuse problems (N/A), inpatient psychiatric care (N/A).    Mental Status Exam (MSE):  Hygiene:  good  Dress: normal  Attitude:  cooperative and proactive  Motor Activity: normal  Speech: normal  Mood:   level  Affect:  congruent  Thought Processes: normal  Thought Content:  normal  Suicidal Thoughts:  not endorsed  Homicidal Thoughts:  not endorsed  Crisis Safety Plan: not needed   Hallucinations:  none      Patient's Support Network Includes:  family, friends      Progress toward goal: there is evidence to suggest that he is taking measures to improve the quality of his life including seeking weight loss surgery.       Functional Status: high      Prognosis: good with weight loss surgery    Evaluation, Diagnoses, and Ability/Capacity to Respond to Treatment:      The pt presented to be struggling with depression related to being overweight (BMI = 40.14, morbid obesity) and the loss of his father in 2021. Results of MSE demonstrated a functional status of high. Strengths: belief in self that he will be successful with weight loss surgery, etc (see detailed list of coping skills above). Needed for growth (CPT code requirement for Weaknesses): weight loss.      From a psychological standpoint, the pt presents as a  good candidate for bariatric surgery. He is motivated for the surgery, has showed readiness for the lifestyle change in terms of starting to adjust his eating habits, and seems to have appropriate expectations of how to prepare and how to live after surgery in order to lose weight successfully.    Treatment Plan:      Short term goals: Start improving his health by following up with his bariatric surgeon in order to receive weight loss surgery as soon as feasible/appropriate and demonstrate success with compliance to adhering to the recommended diet. Long term goals: reach a healthy weight and experience alleviation of depression via taking control of his health.     Allyssa Meléndez, PhD, LP

## 2023-11-14 NOTE — PROGRESS NOTES
"Weight Loss Surgery  Presurgical Nutrition Assessment     Estuardo Crooks  11/14/2023  [id number]  [id number]  1995   male    Surgery desired: LSG (sleeve gastrectomy)     HEIGHT 182.9 cm ( 72\")   WEIGHT 134 kg (296 #)   BMI 40.14        Highest weight ever:  134.5 kg (296 #) - current weight      No Known Allergies    Current Outpatient Medications:     Cholecalciferol 25 MCG (1000 UT) tablet, Take 1 tablet by mouth Daily., Disp: , Rfl:     multivitamin with minerals tablet tablet, Take 1 tablet by mouth Daily., Disp: , Rfl:     QUEtiapine (SEROquel) 100 MG tablet, Take 1 tablet by mouth Every Night. 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 States that he is completely sedentary, sitting in front of a computer and note pads all day. No time for activity. In office alone as . Recently purchased a small refrigerator and a microwave for this area in order to help himself eat more healthily. Patient reports that he has never been a part of any individual or group formal weight management program, and that he does not know much about food and good eating habits.     Nutrition Recall   Example of Usual 24 hour intake:     Breakfast: No food.  Each day, drinks one 9.5 oz Starbucks vanilla frappuccino and 3 to 4 cups black coffee.     Lunch: 90 % of the time eats [employer] # 14 turkey ervin Ranch on wheat combo with curly fries and a large fountain sweet tea.     Dinner: [employer] at home:  chicken wings, burgers, spaghetti, soups, various other meals with another large fountain sweet tea    Snacks: 1 zebra cake before bed each night    Beverages of Choice: as above, 2 large fountain sweet teas per day + 2-3 twelve oz coffees     Food Allergies or Intolerances: none stated or recorded          Exercise / " Activity: no personal planned exercise at this time - states he has no time      Assessment of Nutritional Adequacy, Excessive Intake or Deficiencies:        Protein intake is deficient to ensure successful weight loss. Too few high protein eating episodes                                       Processed / simple Carbohydrate intake is: very high due to sweet tea, meals out and zebra cakes daily                                       Balance of diet with a variety of fruits and vegetables is: deficient - records or states none                                                       Reliance on restaurant food including fast food is: very high - one to two fast food meals per day + other restaurant for dinner                                                                          Ingestion of sweet beverages eg soda, sweet tea, fruit juice: excessive - sweet tea      Education    Provided Nutrition Guidelines for Bariatric and Metabolic Surgery   Reviewed guidelines for higher protein, limited carbohydrate diet to promote weight loss.  Encouraged patient to incorporate these principles of healthy eating.    Educated patient to wisely choose an appropriate protein supplement beverage for the post-surgery liquid diet.  Provided product guidelines and examples.    Explained importance of goal setting to help in changing eating behaviors that are not conducive to weight loss.  Specific macronutrient goals as below.   Provided follow-up options for support, including contact information for dietitians here.     Discussed importance of tracking grams of protein and carbohydrate in diet.  Web-based support information and apps for smart phones and computers given.        Nutrition Goals   Protein goal:  grams per day in three regular balanced meals and two to three high protein snacks each day, to ensure desired weight loss.   Carbohydrate goal:  100-140 grams per day  Beverage goal: Appropriate non-carbonated beverage  intake.  Patient to wean self off of any sweet beverages, including soda.    Exercise Goals  Continue current exercise routine, if appropriate, and obtain approval from caregiver if physically limited for any reason.   Start activity plan per PCP/specialist advice if not currently exercising.     Recommend that team proceed with surgery and follow per protocol.   Marietta Martin RD  11/14/2023  14:40 EST

## 2023-11-14 NOTE — PROGRESS NOTES
[facility]  [address]  [address]  [facility] [id number] [phone]      Patient  Name:  Estuardo Crooks  :  1995      Date of Visit: 2023      Chief Complaint:  weight gain; unable to maintain weight loss      History of Present Illness:  Estuardo Crooks is a 28 y.o. male who presents today for evaluation, education and consultation regarding metabolic and bariatric surgery with Dr. Koch (self-pay).     Estuardo has been overweight for at least 20+ years, has been 35 pounds or more overweight for at least 20+ years, has been 100 pounds or more overweight for 15 or more years and started dieting at age 13.  Previous diet attempts include: High Protein, Low Carbohydrate, Low Fat, Calorie Counting, and Fasting; Diet Center; None.  The most weight Estuardo lost was 50 pounds but was unable to maintain that weight loss.  His maximum lifetime weight is 296 pounds.      GI: episodic heartburn. No meds. No prior eval or hx of h pylori. Denies any postprandial nausea, abdominal pain, bloating.     Other pmhx significant for hyperlipidemia, former smoker, chest pain (no prior cards eval), anxiety/depression.      Complete history has been obtained and discussed today, as pertinent to metabolic/ bariatric surgery.     Past Medical History:   Diagnosis Date    Anxiety     Chest pain     Depression     Former smoker     quit in     GERD (gastroesophageal reflux disease)     episodic; no meds; no prior eval; no hx of h pylori    History of MRSA infection     skin infection    Hyperlipidemia     Obesity     Shoulder pain     no meds; previous injury    Snoring     Urinary urgency      Past Surgical History:   Procedure Laterality Date    EAR TUBES  2000    ROOT CANAL  2020       No Known Allergies    Current Outpatient Medications:     Cholecalciferol 25 MCG (1000 UT) tablet, Take 1 tablet by mouth Daily., Disp: , Rfl:     multivitamin with minerals tablet  tablet, Take 1 tablet by mouth Daily., Disp: , Rfl:     QUEtiapine (SEROquel) 100 MG tablet, Take 1 tablet by mouth Every Night. (Patient taking differently: Take 1 tablet by mouth As Needed.), Disp: 90 tablet, Rfl: 0    sertraline (ZOLOFT) 100 MG tablet, Take 1 tablet by mouth Daily., Disp: 90 tablet, Rfl: 0    Social History     Socioeconomic History    Marital status:    Tobacco Use    Smoking status: Former     Packs/day: 0.50     Years: 5.00     Additional pack years: 0.00     Total pack years: 2.50     Types: Cigarettes     Quit date: 2021     Years since quittin.8    Smokeless tobacco: Former     Quit date: 2021   Vaping Use    Vaping Use: Some days    Last attempt to quit: 2022    Substances: Nicotine    Devices: Pre-filled or refillable cartridge, Refillable tank    Passive vaping exposure: Yes   Substance and Sexual Activity    Alcohol use: Not Currently     Alcohol/week: 1.0 - 3.0 standard drink of alcohol     Types: 1 - 3 Drinks containing 0.5 oz of alcohol per week     Comment: occasional social use    Drug use: No    Sexual activity: Yes     Partners: Female     Social History     Social History Narrative    Pt lives in [facility], he is  with 3 children. He works full time @ [employer].        Family History   Problem Relation Age of Onset    Diabetes Mother         Pre Diabetes    Obesity Mother     Hypertension Mother     Sleep apnea Mother     Sleep apnea Father     Hypertension Father     Obesity Father     Arthritis Father     Colon polyps Father     Diabetes Father         Pre diabetic    Sleep apnea Sister     Hypertension Sister     Obesity Sister     Diabetes Sister         Pre diabetic    Drug abuse Sister     Sleep apnea Maternal Grandmother     Hypertension Maternal Grandmother     Obesity Maternal Grandmother     Heart attack Maternal Grandmother     COPD Maternal Grandmother     Alcohol abuse Maternal Grandmother     Heart disease  Maternal Grandmother     Heart disease Maternal Grandfather     Sleep apnea Maternal Grandfather     Hypertension Maternal Grandfather     Obesity Maternal Grandfather     Diabetes Maternal Grandfather     Heart attack Paternal Grandmother     Heart disease Paternal Grandmother     Hypertension Paternal Grandmother     Obesity Paternal Grandmother     Sleep apnea Paternal Grandmother        Review of Systems:  Constitutional:  reports fatigue, weight gain and denies fevers, chills.  HEENT:  denies headache, ear pain or loss of hearing, blurred or double vision, nasal discharge or sore throat.  Cardiovascular:  reports HLD, chest pain and denies HTN, CAD, Atrial Fib, hx heart disease, heart murmur, hx MI, palpitations, edema, hx DVT.  Respiratory:  reports none and denies dyspnea on exertion, shortness of breath , cough , wheezing, sleep apnea, asthma, COPD, hx PE.  Gastrointestinal:  reports heartburn and denies dysphagia, nausea, vomiting, abdominal pain, IBS, diarrhea, constipation, melena, blood in stool, gallbladder issues, liver disease, hx pancreatitis.  Genitourinary:  reports none and denies history of  frequent UTI, hematuria, dysuria, polyuria, polydipsia, renal insufficiency, renal failure.    Musculoskeletal:  reports joint pain and denies fibromyalgia, arthritis, and autoimmune disease.  Neurological:  reports none and denies headaches, migraines, numbness /tingling, dizziness, confusion, seizure, stroke.  Psychiatric:  reports hx depression, hx anxiety and denies depressed mood, feeling anxious, bipolar disorder, suicidal ideation, hx suicide attempt, hx self injury, hx substance abuse, eating disorder.  Endocrine:  reports none and denies diabetes, thyroid disease, gout.  Hematologic:  reports none and denies bruising, bleeding disorder, hx anemia, hx blood transfusion.  Skin:  reports hx MRSA and denies rashes.    Physical Exam:  Vital Signs:  Weight: 134 kg (296 lb)   Body mass index is 40.14  kg/m².  Temp: 97.7 °F (36.5 °C)   Heart Rate: 97   BP: 142/86     Physical Exam  Constitutional:       General: He is not in acute distress.     Appearance: He is obese.   HENT:      Head: Normocephalic and atraumatic.   Cardiovascular:      Rate and Rhythm: Normal rate and regular rhythm.   Pulmonary:      Effort: Pulmonary effort is normal.      Breath sounds: Normal breath sounds.   Abdominal:      General: Bowel sounds are normal. There is no distension.      Palpations: Abdomen is soft. There is no mass.      Tenderness: There is no abdominal tenderness.   Musculoskeletal:      Cervical back: Normal range of motion and neck supple.   Skin:     General: Skin is warm and dry.   Neurological:      General: No focal deficit present.      Mental Status: He is alert and oriented to person, place, and time.   Psychiatric:         Mood and Affect: Mood normal.         Behavior: Behavior normal.         Patient Active Problem List   Diagnosis    Major depressive disorder with single episode    Other insomnia    Encounter for health maintenance examination    Obesity (BMI 30-39.9)    Viral URI    Hyperlipidemia    GERD (gastroesophageal reflux disease)    Anxiety    Depression    Chest pain    Snoring    Former smoker       Assessment:  28 y.o. male with medically complicated obesity pursuing sleeve gastrectomy.    Metabolic & Bariatric Surgery is deemed medically necessary given the following:  Class 3 Severe Obesity (BMI >=40). Obesity-related health conditions include the following: dyslipidemias. Obesity is worsening. BMI is is above average; BMI management plan is completed. We discussed consulting a Bariatric surgeon.        Plan:  Further evaluation will include: CBC, CMP, Lipids, TSH, HgA1C, H.Pylori UBT, Pulmonary Function Testing, and EGD.    Additional clearances needed prior to surgery will include: Cardiology.     Patient understands that bariatric surgery is not cosmetic surgery but rather a tool to help  make a lifelong commitment to lifestyle changes including diet, exercise and behavior modifications.  The patient has been educated today on those expected postoperative lifestyle changes.  Psychological and Nutritional consultations will be completed prior to surgery.  Instructions on how to access [employer] (an internet based site w/ educational surgical videos) were given to the patient.  Recommended perioperative vitamin supplementation was reviewed.  The importance of avoiding ASA/ NSAIDS/ steroids/ tobacco/nicotine/ hormones/ immunomodulators perioperatively was discussed in detail.  All questions/concerns have been addressed.      Further input to follow pending the above.           MYA Lamb

## 2023-11-15 LAB
ALBUMIN SERPL-MCNC: 4.7 G/DL (ref 4.3–5.2)
ALBUMIN/GLOB SERPL: 1.7 {RATIO} (ref 1.2–2.2)
ALP SERPL-CCNC: 76 IU/L (ref 44–121)
ALT SERPL-CCNC: 51 IU/L (ref 0–44)
AST SERPL-CCNC: 27 IU/L (ref 0–40)
BASOPHILS # BLD AUTO: 0.1 X10E3/UL (ref 0–0.2)
BASOPHILS NFR BLD AUTO: 1 %
BILIRUB SERPL-MCNC: 0.3 MG/DL (ref 0–1.2)
BUN SERPL-MCNC: 11 MG/DL (ref 6–20)
BUN/CREAT SERPL: 13 (ref 9–20)
CALCIUM SERPL-MCNC: 9.9 MG/DL (ref 8.7–10.2)
CHLORIDE SERPL-SCNC: 102 MMOL/L (ref 96–106)
CHOLEST SERPL-MCNC: 204 MG/DL (ref 100–199)
CO2 SERPL-SCNC: 27 MMOL/L (ref 20–29)
CREAT SERPL-MCNC: 0.84 MG/DL (ref 0.76–1.27)
EGFRCR SERPLBLD CKD-EPI 2021: 120 ML/MIN/1.73
EOSINOPHIL # BLD AUTO: 0.2 X10E3/UL (ref 0–0.4)
EOSINOPHIL NFR BLD AUTO: 2 %
ERYTHROCYTE [DISTWIDTH] IN BLOOD BY AUTOMATED COUNT: 12.3 % (ref 11.6–15.4)
GLOBULIN SER CALC-MCNC: 2.7 G/DL (ref 1.5–4.5)
GLUCOSE SERPL-MCNC: 79 MG/DL (ref 70–99)
HBA1C MFR BLD: 5.4 % (ref 4.8–5.6)
HCT VFR BLD AUTO: 46.1 % (ref 37.5–51)
HDLC SERPL-MCNC: 49 MG/DL
HGB BLD-MCNC: 15.4 G/DL (ref 13–17.7)
IMM GRANULOCYTES # BLD AUTO: 0 X10E3/UL (ref 0–0.1)
IMM GRANULOCYTES NFR BLD AUTO: 0 %
LDLC SERPL CALC-MCNC: 120 MG/DL (ref 0–99)
LYMPHOCYTES # BLD AUTO: 2.4 X10E3/UL (ref 0.7–3.1)
LYMPHOCYTES NFR BLD AUTO: 27 %
MCH RBC QN AUTO: 30.5 PG (ref 26.6–33)
MCHC RBC AUTO-ENTMCNC: 33.4 G/DL (ref 31.5–35.7)
MCV RBC AUTO: 91 FL (ref 79–97)
MONOCYTES # BLD AUTO: 1.1 X10E3/UL (ref 0.1–0.9)
MONOCYTES NFR BLD AUTO: 12 %
NEUTROPHILS # BLD AUTO: 5.5 X10E3/UL (ref 1.4–7)
NEUTROPHILS NFR BLD AUTO: 58 %
PLATELET # BLD AUTO: 281 X10E3/UL (ref 150–450)
POTASSIUM SERPL-SCNC: 4.7 MMOL/L (ref 3.5–5.2)
PROT SERPL-MCNC: 7.4 G/DL (ref 6–8.5)
RBC # BLD AUTO: 5.05 X10E6/UL (ref 4.14–5.8)
SODIUM SERPL-SCNC: 142 MMOL/L (ref 134–144)
TRIGL SERPL-MCNC: 201 MG/DL (ref 0–149)
TSH SERPL DL<=0.005 MIU/L-ACNC: 1.35 UIU/ML (ref 0.45–4.5)
VLDLC SERPL CALC-MCNC: 35 MG/DL (ref 5–40)
WBC # BLD AUTO: 9.2 X10E3/UL (ref 3.4–10.8)

## 2023-11-16 DIAGNOSIS — R74.8 INCREASED LIVER ENZYMES: Primary | ICD-10-CM

## 2023-11-16 LAB — UREA BREATH TEST QL: NEGATIVE

## 2023-11-28 DIAGNOSIS — E78.5 HYPERLIPIDEMIA, UNSPECIFIED HYPERLIPIDEMIA TYPE: ICD-10-CM

## 2023-11-28 DIAGNOSIS — Z87.891 FORMER SMOKER: ICD-10-CM

## 2023-11-28 DIAGNOSIS — E66.01 OBESITY, CLASS III, BMI 40-49.9 (MORBID OBESITY): Primary | ICD-10-CM

## 2023-11-30 ENCOUNTER — TELEPHONE (OUTPATIENT)
Dept: INTERNAL MEDICINE | Facility: CLINIC | Age: 28
End: 2023-11-30
Payer: COMMERCIAL

## 2023-11-30 ENCOUNTER — TELEMEDICINE (OUTPATIENT)
Dept: BARIATRICS/WEIGHT MGMT | Facility: CLINIC | Age: 28
End: 2023-11-30
Payer: COMMERCIAL

## 2023-11-30 ENCOUNTER — TELEMEDICINE (OUTPATIENT)
Dept: FAMILY MEDICINE CLINIC | Facility: TELEHEALTH | Age: 28
End: 2023-11-30
Payer: COMMERCIAL

## 2023-11-30 DIAGNOSIS — E66.01 OBESITY, CLASS III, BMI 40-49.9 (MORBID OBESITY): Primary | ICD-10-CM

## 2023-11-30 DIAGNOSIS — J02.0 STREP THROAT: Primary | ICD-10-CM

## 2023-11-30 DIAGNOSIS — K21.9 GASTROESOPHAGEAL REFLUX DISEASE, UNSPECIFIED WHETHER ESOPHAGITIS PRESENT: ICD-10-CM

## 2023-11-30 RX ORDER — AZITHROMYCIN 250 MG/1
TABLET, FILM COATED ORAL
Qty: 6 TABLET | Refills: 0 | Status: SHIPPED | OUTPATIENT
Start: 2023-11-30 | End: 2023-12-04

## 2023-11-30 NOTE — PROGRESS NOTES
"[facility]ariat[facility] Surgery  [address]  [address]  Formerly [facility] [id number] [phone]        Patient Name:  Estuardo Crooks.  :  1995        Reason for Visit:   Weight gain, unable to maintain weightloss, evaluate for possible metabolic and bariatric surgery      HPI: Estuardo Crooks is a 28 y.o. male who presents for evaluation of reflux in preparation for bariatric and metabolic surgery, specifically laparoscopic sleeve gastrectomy with Dr. Koch.     Patient presents during the COVID-19 pandemic/federally declared state of public health emergency.  This service was conducted via [employer].  .  Provider is located at her primary office location. The use of a video visit has been reviewed with the patient and verbal informed consent has been obtained.    Per initial intake with Mamta Burciaga:  \"Estuardo has been overweight for at least 20+ years, has been 35 pounds or more overweight for at least 20+ years, has been 100 pounds or more overweight for 15 or more years and started dieting at age 13.  Previous diet attempts include: High Protein, Low Carbohydrate, Low Fat, Calorie Counting, and Fasting; Diet Center; None.  The most weight Estuardo lost was 50 pounds but was unable to maintain that weight loss.  His maximum lifetime weight is 296 pounds.       GI: episodic heartburn. No meds. No prior eval or hx of h pylori. Denies any postprandial nausea, abdominal pain, bloating.      Other pmhx significant for hyperlipidemia, former smoker, chest pain (no prior cards eval), anxiety/depression.      Complete history has been obtained and discussed today, as pertinent to metabolic/ bariatric surgery. \"         Doing well.  No changes in medical history since last office visit. No other issues/concerns today. Notes occ hearburn as above. Denies dysphagia, nausea, vomiting, abdominal pain, pulmonary issues, and fevers.  H pylori testing was negative. .         Past Medical History: "   Diagnosis Date    Anxiety     Chest pain     Depression     Former smoker     quit in     GERD (gastroesophageal reflux disease)     episodic; no meds; no prior eval; no hx of h pylori    History of MRSA infection     skin infection    Hyperlipidemia     Obesity     Shoulder pain     no meds; previous injury    Snoring     Urinary urgency      Past Surgical History:   Procedure Laterality Date    EAR TUBES  2000    ROOT CANAL  2020     Outpatient Medications Marked as Taking for the 23 encounter (Telemedicine) with Carmen Hernandez PA-C   Medication Sig Dispense Refill    Cholecalciferol 25 MCG (1000 UT) tablet Take 1 tablet by mouth Daily.      multivitamin with minerals tablet tablet Take 1 tablet by mouth Daily.      QUEtiapine (SEROquel) 100 MG tablet Take 1 tablet by mouth Every Night. (Patient taking differently: Take 1 tablet by mouth As Needed.) 90 tablet 0    sertraline (ZOLOFT) 100 MG tablet Take 1 tablet by mouth Daily. 90 tablet 0       No Known Allergies    Social History     Socioeconomic History    Marital status:    Tobacco Use    Smoking status: Former     Packs/day: 0.50     Years: 5.00     Additional pack years: 0.00     Total pack years: 2.50     Types: Cigarettes     Quit date: 2021     Years since quittin.8    Smokeless tobacco: Former     Quit date: 2021   Vaping Use    Vaping Use: Some days    Last attempt to quit: 2022    Substances: Nicotine    Devices: Pre-filled or refillable cartridge, Refillable tank    Passive vaping exposure: Yes   Substance and Sexual Activity    Alcohol use: Not Currently     Alcohol/week: 1.0 - 3.0 standard drink of alcohol     Types: 1 - 3 Drinks containing 0.5 oz of alcohol per week     Comment: occasional social use    Drug use: No    Sexual activity: Yes     Partners: Female       There were no vitals taken for this visit.    Physical Exam  Constitutional:       Appearance: He is well-developed.   HENT:      Head:  Normocephalic and atraumatic.   Pulmonary:      Effort: Pulmonary effort is normal.   Neurological:      Mental Status: He is alert and oriented to person, place, and time.   Psychiatric:         Thought Content: Thought content normal.           Assessment:  who presents for evaluation of reflux in preparation for bariatric and metabolic surgery, specifically laparoscopic sleeve gastrectomy with Dr. Koch.     ICD-10-CM ICD-9-CM   1. Obesity, Class III, BMI 40-49.9 (morbid obesity)  E66.01 278.01   2. Gastroesophageal reflux disease, unspecified whether esophagitis present  K21.9 530.81         Plan:  Will proceed with EGD for further evaluation. The risks and benefits of the upper endoscopy were discussed with the patient in detail and all questions were answered.  Possibility of perforation, bleeding, aspiration, and anesthesia reaction were reviewed.  Patient agrees to proceed.

## 2023-11-30 NOTE — PATIENT INSTRUCTIONS
Strep Throat, Adult  Strep throat is an infection in the throat that is caused by bacteria. It is common during the cold months of the year. It mostly affects children who are 5-15 years old. However, people of all ages can get it at any time of the year. This infection spreads from person to person (is contagious) through coughing, sneezing, or having close contact.  Your health care provider may use other names to describe the infection. When strep throat affects the tonsils, it is called tonsillitis. When it affects the back of the throat, it is called pharyngitis.  What are the causes?  This condition is caused by the Streptococcus pyogenes bacteria.  What increases the risk?  You are more likely to develop this condition if:  You care for school-age children, or are around school-age children. Children are more likely to get strep throat and may spread it to others.  You spend time in crowded places where the infection can spread easily.  You have close contact with someone who has strep throat.  What are the signs or symptoms?  Symptoms of this condition include:  Fever or chills.  Redness, swelling, or pain in the tonsils or throat.  Pain or difficulty when swallowing.  White or yellow spots on the tonsils or throat.  Tender glands in the neck and under the jaw.  Bad smelling breath.  Red rash all over the body. This is rare.  How is this diagnosed?  This condition is diagnosed by tests that check for the presence and the amount of bacteria that cause strep throat. They are:  Rapid strep test. Your throat is swabbed and checked for the presence of bacteria. Results are usually ready in minutes.  Throat culture test. Your throat is swabbed. The sample is placed in a cup that allows infections to grow. Results are usually ready in 1 or 2 days.  How is this treated?  This condition may be treated with:  Medicines that kill germs (antibiotics).  Medicines that relieve pain or fever. These include:  Ibuprofen or  acetaminophen.  Aspirin, only for people who are over the age of 18.  Throat lozenges.  Throat sprays.  Follow these instructions at home:  Medicines    Take over-the-counter and prescription medicines only as told by your health care provider.  Take your antibiotic medicine as told by your health care provider. Do not stop taking the antibiotic even if you start to feel better.  Eating and drinking    If you have trouble swallowing, try eating soft foods until your sore throat feels better.  Drink enough fluid to keep your urine pale yellow.  To help relieve pain, you may have:  Warm fluids, such as soup and tea.  Cold fluids, such as frozen desserts or popsicles.  General instructions  Gargle with a salt-water mixture 3-4 times a day or as needed. To make a salt-water mixture, completely dissolve ½-1 tsp (3-6 g) of salt in 1 cup (237 mL) of warm water.  Get plenty of rest.  Stay home from work or school until you have been taking antibiotics for 24 hours.  Do not use any products that contain nicotine or tobacco. These products include cigarettes, chewing tobacco, and vaping devices, such as e-cigarettes. If you need help quitting, ask your health care provider.  It is up to you to get your test results. Ask your health care provider, or the department that is doing the test, when your results will be ready.  Keep all follow-up visits. This is important.  How is this prevented?    Do not share food, drinking cups, or personal items that could cause the infection to spread to other people.  Wash your hands often with soap and water for at least 20 seconds. If soap and water are not available, use hand . Make sure that all people in your house wash their hands well.  Have family members tested if they have a sore throat or fever. They may need an antibiotic if they have strep throat.  Contact a health care provider if:  You have swelling in your neck that keeps getting bigger.  You develop a rash, cough, or  earache.  You cough up a thick mucus that is green, yellow-brown, or bloody.  You have pain or discomfort that does not get better with medicine.  Your symptoms seem to be getting worse.  You have a fever.  Get help right away if:  You have new symptoms, such as vomiting, severe headache, stiff or painful neck, chest pain, or shortness of breath.  You have severe throat pain, drooling, or changes in your voice.  You have swelling of the neck, or the skin on the neck becomes red and tender.  You have signs of dehydration, such as tiredness (fatigue), dry mouth, and decreased urination.  You become increasingly sleepy, or you cannot wake up completely.  Your joints become red or painful.  These symptoms may represent a serious problem that is an emergency. Do not wait to see if the symptoms will go away. Get medical help right away. Call your local emergency services (911 in the [place] Do not drive yourself to the hospital.  Summary  Strep throat is an infection in the throat that is caused by the Streptococcus pyogenes bacteria. This infection is spread from person to person (is contagious) through coughing, sneezing, or having close contact.  Take your medicines, including antibiotics, as told by your health care provider. Do not stop taking the antibiotic even if you start to feel better.  To prevent the spread of germs, wash your hands well with soap and water. Have others do the same. Do not share food, drinking cups, or personal items.  Get help right away if you have new symptoms, such as vomiting, severe headache, stiff or painful neck, chest pain, or shortness of breath.  This information is not intended to replace advice given to you by your health care provider. Make sure you discuss any questions you have with your health care provider.  Document Revised: 04/12/2022 Document Reviewed: 04/12/2022  Elsevier Patient Education © 2023 Elsevier Inc.

## 2023-11-30 NOTE — TELEPHONE ENCOUNTER
Regarding: Strep in the [facility]   Contact: [phone]  ----- Message from Mara Grewal MA sent at 11/30/2023  2:03 PM EST -----       ----- Message from Estuardo Crooks to Lorena Ferraro APRN sent at 11/30/2023 10:12 AM -----   Her strep test for strep A came back negative, she still thinks there is some strep, defiantly has an ear infection. Today my symptoms are getting worse, and my father-in-law tested positive for covid as well.      ----- Message -----       From:Estuardo Crooks       Sent:11/29/2023  8:11 PM EST         To:Lorena Ferraro    Subject:Strep in the [facility]     Good evening , my daughter had a double ear infection last week. Today my wife went and saw Helen Hernandez and has an ear infection and strep throat. I am coming down with the same symptoms and was hoping you could possibly prescribe me whatever you thought could help me. I would greatly appreciate it.

## 2023-11-30 NOTE — TELEPHONE ENCOUNTER
Called patient to schedule an appointment as recommended by PCP, he states already seen doctor and everything taken care of.

## 2023-11-30 NOTE — PROGRESS NOTES
You have chosen to receive care through a telehealth visit.  Do you consent to use a video/audio connection for your medical care today? Yes     CHIEF COMPLAINT  No chief complaint on file.        HPI  Estuardo Crooks is a 28 y.o. male  presents with complaint of 1 day history of PND, sore throat, swollen lymph nodes in front of neck, mild cough.  Denies fever, wheezing or shortness of breath.     Wife diagnosed with strep throat and OM    Review of Systems  See HPI    Past Medical History:   Diagnosis Date    Anxiety     Chest pain     Depression     Former smoker     quit in 2021    GERD (gastroesophageal reflux disease)     episodic; no meds; no prior eval; no hx of h pylori    History of MRSA infection     skin infection    Hyperlipidemia     Obesity     Shoulder pain     no meds; previous injury    Snoring     Urinary urgency        Family History   Problem Relation Age of Onset    Diabetes Mother         Pre Diabetes    Obesity Mother     Hypertension Mother     Sleep apnea Mother     Sleep apnea Father     Hypertension Father     Obesity Father     Arthritis Father     Colon polyps Father     Diabetes Father         Pre diabetic    Sleep apnea Sister     Hypertension Sister     Obesity Sister     Diabetes Sister         Pre diabetic    Drug abuse Sister     Sleep apnea Maternal Grandmother     Hypertension Maternal Grandmother     Obesity Maternal Grandmother     Heart attack Maternal Grandmother     COPD Maternal Grandmother     Alcohol abuse Maternal Grandmother     Heart disease Maternal Grandmother     Heart disease Maternal Grandfather     Sleep apnea Maternal Grandfather     Hypertension Maternal Grandfather     Obesity Maternal Grandfather     Diabetes Maternal Grandfather     Heart attack Paternal Grandmother     Heart disease Paternal Grandmother     Hypertension Paternal Grandmother     Obesity Paternal Grandmother     Sleep apnea Paternal Grandmother        Social History     Socioeconomic  History    Marital status:    Tobacco Use    Smoking status: Former     Packs/day: 0.50     Years: 5.00     Additional pack years: 0.00     Total pack years: 2.50     Types: Cigarettes     Quit date: 2021     Years since quittin.8    Smokeless tobacco: Former     Quit date: 2021   Vaping Use    Vaping Use: Some days    Last attempt to quit: 2022    Substances: Nicotine    Devices: Pre-filled or refillable cartridge, Refillable tank    Passive vaping exposure: Yes   Substance and Sexual Activity    Alcohol use: Not Currently     Alcohol/week: 1.0 - 3.0 standard drink of alcohol     Types: 1 - 3 Drinks containing 0.5 oz of alcohol per week     Comment: occasional social use    Drug use: No    Sexual activity: Yes     Partners: Female       Estuardo Crooks  reports that he quit smoking about 2 years ago. His smoking use included cigarettes. He has a 2.50 pack-year smoking history. He quit smokeless tobacco use about 2 years ago..               There were no vitals taken for this visit.    PHYSICAL EXAM  Physical Exam   Constitutional: He is oriented to person, place, and time. He appears well-developed and well-nourished. He does not have a sickly appearance. He does not appear ill.   HENT:   Head: Normocephalic and atraumatic.   Pulmonary/Chest: Effort normal.  No respiratory distress.  Lymphadenopathy:        Right cervical: Anterior cervical adenopathy present.        Left cervical: Anterior cervical adenopathy present.   Neurological: He is alert and oriented to person, place, and time.           Diagnoses and all orders for this visit:    1. Strep throat (Primary)  -     azithromycin (Zithromax Z-Maxwell) 250 MG tablet; Take 2 tablets by mouth on day 1, then 1 tablet daily on days 2-5  Dispense: 6 tablet; Refill: 0    --take medications as prescribed  --increase fluids, rest as needed, tylenol or ibuprofen for pain  --f/u in 5-7 days if no improvement        FOLLOW-UP  As discussed during  visit with PCP/[facility] if no improvement or Urgent Care/Emergency Department if worsening of symptoms    Patient verbalizes understanding of medication dosage, comfort measures, instructions for treatment and follow-up.    Aminata Royal, APRN  11/30/2023  15:06 EST    The use of a video visit has been reviewed with the patient and verbal informed consent has been obtained. Myself and Estuardo Crooks participated in this visit. The patient is located in [address] [place] [address].    I am located in [place], [place]. [employer] and [employer] were utilized. I spent 8 minutes in the patient's chart for this visit.

## 2023-12-04 ENCOUNTER — OFFICE VISIT (OUTPATIENT)
Dept: CARDIOLOGY | Facility: CLINIC | Age: 28
End: 2023-12-04
Payer: COMMERCIAL

## 2023-12-04 VITALS
OXYGEN SATURATION: 97 % | SYSTOLIC BLOOD PRESSURE: 130 MMHG | HEART RATE: 99 BPM | HEIGHT: 72 IN | WEIGHT: 296 LBS | BODY MASS INDEX: 40.09 KG/M2 | DIASTOLIC BLOOD PRESSURE: 78 MMHG

## 2023-12-04 DIAGNOSIS — R94.31 ABNORMAL ECG: Primary | ICD-10-CM

## 2023-12-04 PROCEDURE — 99204 OFFICE O/P NEW MOD 45 MIN: CPT | Performed by: INTERNAL MEDICINE

## 2023-12-04 NOTE — PROGRESS NOTES
"Chief Complaint  Cardiac Clearance (New Patient)      Subjective   History of Present Illness    Problem List  -preop for bariatric surgery  -EKG inferior MI pattern    Mr. Crooks is a 28 year old man with above hx.  No current CV complaints.  Few years ago had some left sided chest pain and radiation down left arm.  Eventually had it checked out expect told maybe had a virus and fluid around his heart.  Symptoms completely resolved and his able to get around ok without issue.  ROS negative except for the above.         Objective   Vital Signs:  Vitals:    12/04/23 1454   BP: 130/78   Pulse: 99   SpO2: 97%     Estimated body mass index is 40.14 kg/m² as calculated from the following:    Height as of this encounter: 182.9 cm (72\").    Weight as of this encounter: 134 kg (296 lb).       Physical Exam  HENT:      Head: Normocephalic.   Eyes:      Extraocular Movements: Extraocular movements intact.   Cardiovascular:      Rate and Rhythm: Normal rate and regular rhythm.      Heart sounds: No murmur heard.     No gallop.   Pulmonary:      Breath sounds: Normal breath sounds.   Abdominal:      Palpations: Abdomen is soft.   Musculoskeletal:      Right lower leg: No edema.      Left lower leg: No edema.   Skin:     General: Skin is warm and dry.   Neurological:      General: No focal deficit present.      Mental Status: He is alert.   Psychiatric:         Mood and Affect: Mood normal.               Assessment   -preop for bariatric surgery  -EKG inferior MI pattern    Plan   Can proceed with bariatric surgery without further CV eval  Echo for EKG findings    Return in about 3 months (around 3/4/2024).  Srinivasan Acosta MD  12/04/2023 15:09 EST     "

## 2023-12-07 DIAGNOSIS — F41.9 ANXIETY: ICD-10-CM

## 2023-12-07 DIAGNOSIS — G47.09 OTHER INSOMNIA: ICD-10-CM

## 2023-12-07 DIAGNOSIS — F43.21 GRIEF: ICD-10-CM

## 2023-12-08 RX ORDER — QUETIAPINE FUMARATE 100 MG/1
100 TABLET, FILM COATED ORAL NIGHTLY
Qty: 90 TABLET | Refills: 0 | Status: SHIPPED | OUTPATIENT
Start: 2023-12-08

## 2023-12-08 RX ORDER — SERTRALINE HYDROCHLORIDE 100 MG/1
100 TABLET, FILM COATED ORAL DAILY
Qty: 90 TABLET | Refills: 0 | Status: SHIPPED | OUTPATIENT
Start: 2023-12-08

## 2023-12-18 ENCOUNTER — OUTSIDE FACILITY SERVICE (OUTPATIENT)
Dept: BARIATRICS/WEIGHT MGMT | Facility: CLINIC | Age: 28
End: 2023-12-18
Payer: COMMERCIAL

## 2023-12-18 ENCOUNTER — LAB REQUISITION (OUTPATIENT)
Dept: LAB | Facility: HOSPITAL | Age: 28
End: 2023-12-18
Payer: COMMERCIAL

## 2023-12-18 DIAGNOSIS — K21.9 GASTRO-ESOPHAGEAL REFLUX DISEASE WITHOUT ESOPHAGITIS: ICD-10-CM

## 2023-12-18 PROCEDURE — 88305 TISSUE EXAM BY PATHOLOGIST: CPT

## 2023-12-19 LAB — REF LAB TEST METHOD: NORMAL

## 2023-12-22 ENCOUNTER — PATIENT ROUNDING (BHMG ONLY) (OUTPATIENT)
Dept: BARIATRICS/WEIGHT MGMT | Facility: CLINIC | Age: 28
End: 2023-12-22
Payer: COMMERCIAL

## 2023-12-22 NOTE — PROGRESS NOTES
A [employer] message has been sent to the patient for PATIENT ROUNDING for [facility]ariat[facility].

## 2023-12-26 DIAGNOSIS — K21.9 GASTROESOPHAGEAL REFLUX DISEASE, UNSPECIFIED WHETHER ESOPHAGITIS PRESENT: ICD-10-CM

## 2024-01-16 ENCOUNTER — HOSPITAL ENCOUNTER (OUTPATIENT)
Dept: CARDIOLOGY | Facility: HOSPITAL | Age: 29
Discharge: HOME | End: 2024-01-16
Admitting: INTERNAL MEDICINE
Payer: COMMERCIAL

## 2024-01-16 VITALS — HEIGHT: 72 IN | WEIGHT: 296 LBS | BODY MASS INDEX: 40.09 KG/M2

## 2024-01-16 DIAGNOSIS — R94.31 ABNORMAL ECG: ICD-10-CM

## 2024-01-16 PROCEDURE — 93306 TTE W/DOPPLER COMPLETE: CPT

## 2024-01-17 LAB
BH CV ECHO MEAS - AO MAX PG: 5.6 MMHG
BH CV ECHO MEAS - AO MEAN PG: 3 MMHG
BH CV ECHO MEAS - AO ROOT DIAM: 3.2 CM
BH CV ECHO MEAS - AO V2 MAX: 118 CM/SEC
BH CV ECHO MEAS - AO V2 VTI: 25 CM
BH CV ECHO MEAS - AVA(I,D): 2.6 CM2
BH CV ECHO MEAS - EDV(CUBED): 205.4 ML
BH CV ECHO MEAS - EDV(MOD-SP2): 150 ML
BH CV ECHO MEAS - EDV(MOD-SP4): 171 ML
BH CV ECHO MEAS - EF(MOD-BP): 57 %
BH CV ECHO MEAS - EF(MOD-SP2): 60 %
BH CV ECHO MEAS - EF(MOD-SP4): 56 %
BH CV ECHO MEAS - ESV(CUBED): 64 ML
BH CV ECHO MEAS - ESV(MOD-SP2): 60 ML
BH CV ECHO MEAS - ESV(MOD-SP4): 75.2 ML
BH CV ECHO MEAS - FS: 32.2 %
BH CV ECHO MEAS - IVS/LVPW: 1 CM
BH CV ECHO MEAS - IVSD: 1 CM
BH CV ECHO MEAS - LA DIMENSION: 4.3 CM
BH CV ECHO MEAS - LAT PEAK E' VEL: 17.5 CM/SEC
BH CV ECHO MEAS - LV DIASTOLIC VOL/BSA (35-75): 67.9 CM2
BH CV ECHO MEAS - LV MASS(C)D: 239.9 GRAMS
BH CV ECHO MEAS - LV MAX PG: 4.1 MMHG
BH CV ECHO MEAS - LV MEAN PG: 2 MMHG
BH CV ECHO MEAS - LV SYSTOLIC VOL/BSA (12-30): 29.9 CM2
BH CV ECHO MEAS - LV V1 MAX: 101 CM/SEC
BH CV ECHO MEAS - LV V1 VTI: 20.7 CM
BH CV ECHO MEAS - LVIDD: 5.9 CM
BH CV ECHO MEAS - LVIDS: 4 CM
BH CV ECHO MEAS - LVOT AREA: 3.1 CM2
BH CV ECHO MEAS - LVOT DIAM: 2 CM
BH CV ECHO MEAS - LVPWD: 1 CM
BH CV ECHO MEAS - MED PEAK E' VEL: 13.5 CM/SEC
BH CV ECHO MEAS - MV A MAX VEL: 59.7 CM/SEC
BH CV ECHO MEAS - MV DEC SLOPE: 713 CM/SEC2
BH CV ECHO MEAS - MV DEC TIME: 0.17 SEC
BH CV ECHO MEAS - MV E MAX VEL: 104 CM/SEC
BH CV ECHO MEAS - MV E/A: 1.74
BH CV ECHO MEAS - MV MAX PG: 5.4 MMHG
BH CV ECHO MEAS - MV MEAN PG: 2 MMHG
BH CV ECHO MEAS - MV P1/2T: 48.9 MSEC
BH CV ECHO MEAS - MV V2 VTI: 33.7 CM
BH CV ECHO MEAS - MVA(P1/2T): 4.5 CM2
BH CV ECHO MEAS - MVA(VTI): 1.93 CM2
BH CV ECHO MEAS - PA ACC TIME: 0.14 SEC
BH CV ECHO MEAS - PA V2 MAX: 96.2 CM/SEC
BH CV ECHO MEAS - RAP SYSTOLE: 3 MMHG
BH CV ECHO MEAS - RVSP: 28 MMHG
BH CV ECHO MEAS - SI(MOD-SP2): 35.8 ML/M2
BH CV ECHO MEAS - SI(MOD-SP4): 38.1 ML/M2
BH CV ECHO MEAS - SV(LVOT): 65 ML
BH CV ECHO MEAS - SV(MOD-SP2): 90 ML
BH CV ECHO MEAS - SV(MOD-SP4): 95.8 ML
BH CV ECHO MEAS - TAPSE (>1.6): 2.19 CM
BH CV ECHO MEAS - TR MAX PG: 25.4 MMHG
BH CV ECHO MEAS - TR MAX VEL: 252 CM/SEC
BH CV ECHO MEASUREMENTS AVERAGE E/E' RATIO: 6.71
BH CV VAS BP LEFT ARM: NORMAL MMHG
BH CV XLRA - RV BASE: 3.6 CM
BH CV XLRA - RV LENGTH: 7.9 CM
BH CV XLRA - RV MID: 3.2 CM
BH CV XLRA - TDI S': 15 CM/SEC
LEFT ATRIUM VOLUME INDEX: 24.9 ML/M2

## 2024-01-31 ENCOUNTER — OFFICE VISIT (OUTPATIENT)
Dept: PULMONOLOGY | Facility: CLINIC | Age: 29
End: 2024-01-31
Payer: COMMERCIAL

## 2024-01-31 VITALS
BODY MASS INDEX: 41.17 KG/M2 | HEIGHT: 72 IN | OXYGEN SATURATION: 96 % | SYSTOLIC BLOOD PRESSURE: 130 MMHG | WEIGHT: 304 LBS | TEMPERATURE: 98.2 F | HEART RATE: 83 BPM | DIASTOLIC BLOOD PRESSURE: 76 MMHG

## 2024-01-31 DIAGNOSIS — R06.02 SHORTNESS OF BREATH: Primary | ICD-10-CM

## 2024-01-31 DIAGNOSIS — E66.9 OBESITY (BMI 30-39.9): ICD-10-CM

## 2024-01-31 DIAGNOSIS — Z87.891 FORMER SMOKER: ICD-10-CM

## 2024-01-31 NOTE — PROGRESS NOTES
PULMONARY  NOTE    Chief Complaint     Obesity, former smoker    History of Present Illness     28-year-old male referred for preoperative clearance for bariatric surgery    He has a history of tobacco abuse, resolved in 2021    No history of chronic lung disease  He is on no respiratory medications    No regular cough or sputum production  He has never had hemoptysis    He has experienced acute bronchitis and COVID in the past but does not have any persistent respiratory symptoms    Patient Active Problem List   Diagnosis    Major depressive disorder with single episode    Other insomnia    Encounter for health maintenance examination    Obesity (BMI 30-39.9)    Viral URI    Hyperlipidemia    GERD (gastroesophageal reflux disease)    Anxiety    Depression    Chest pain    Snoring    Former smoker    Increased liver enzymes      No Known Allergies    Current Outpatient Medications:     Cholecalciferol 25 MCG (1000 UT) tablet, Take 1 tablet by mouth Daily., Disp: , Rfl:     multivitamin with minerals tablet tablet, Take 1 tablet by mouth Daily., Disp: , Rfl:     QUEtiapine (SEROquel) 100 MG tablet, Take 1 tablet by mouth Every Night., Disp: 90 tablet, Rfl: 0    sertraline (ZOLOFT) 100 MG tablet, Take 1 tablet by mouth Daily., Disp: 90 tablet, Rfl: 0  MEDICATION LIST AND ALLERGIES REVIEWED.    Family History   Problem Relation Age of Onset    Diabetes Mother         Pre Diabetes    Obesity Mother     Hypertension Mother     Sleep apnea Mother     Sleep apnea Father     Hypertension Father     Obesity Father     Arthritis Father     Colon polyps Father     Diabetes Father         Pre diabetic    Sleep apnea Sister     Hypertension Sister     Obesity Sister     Diabetes Sister         Pre diabetic    Drug abuse Sister     Sleep apnea Maternal Grandmother     Hypertension Maternal Grandmother     Obesity Maternal Grandmother     Heart attack Maternal Grandmother     COPD Maternal Grandmother     Alcohol abuse Maternal  "Grandmother     Heart disease Maternal Grandmother     Heart disease Maternal Grandfather     Sleep apnea Maternal Grandfather     Hypertension Maternal Grandfather     Obesity Maternal Grandfather     Diabetes Maternal Grandfather     Heart attack Paternal Grandmother     Heart disease Paternal Grandmother     Hypertension Paternal Grandmother     Obesity Paternal Grandmother     Sleep apnea Paternal Grandmother      Social History     Tobacco Use    Smoking status: Former     Packs/day: 0.50     Years: 5.00     Additional pack years: 0.00     Total pack years: 2.50     Types: Cigarettes     Quit date: 1/24/2021     Years since quitting: 3.0    Smokeless tobacco: Former     Quit date: 1/24/2021   Vaping Use    Vaping Use: Some days    Last attempt to quit: 1/8/2022    Substances: Nicotine    Devices: Pre-filled or refillable cartridge, Refillable tank    Passive vaping exposure: Yes   Substance Use Topics    Alcohol use: Not Currently     Alcohol/week: 1.0 - 3.0 standard drink of alcohol     Types: 1 - 3 Drinks containing 0.5 oz of alcohol per week     Comment: occasional social use    Drug use: No     Social History     Social History Narrative    Pt lives in [place] [facility], he is  with 3 children. He works full time @ [employer].     Has not smoked since 2021    Uses no vaping or inhalation products     FAMILY AND SOCIAL HISTORY REVIEWED.    Review of Systems  IF PRESENT REFER TO SCANNED ROS SHEET FROM SAME DATE  OTHERWISE ROS OBTAINED AND NON-CONTRIBUTORY OVER HPI.    /76   Pulse 83   Temp 98.2 °F (36.8 °C) (Infrared)   Ht 182.9 cm (72.01\")   Wt (!) 138 kg (304 lb)   SpO2 96% Comment: room air at rest  BMI 41.22 kg/m²   Physical Exam  Vitals and nursing note reviewed.   Constitutional:       General: He is not in acute distress.     Appearance: He is well-developed. He is not diaphoretic.   HENT:      Head: Normocephalic and atraumatic.   Neck:      Thyroid: No thyromegaly. "   Cardiovascular:      Rate and Rhythm: Normal rate and regular rhythm.      Heart sounds: Normal heart sounds. No murmur heard.  Pulmonary:      Effort: Pulmonary effort is normal.      Breath sounds: Normal breath sounds. No stridor.   Lymphadenopathy:      Cervical: No cervical adenopathy.      Upper Body:      Right upper body: No supraclavicular or epitrochlear adenopathy.      Left upper body: No supraclavicular or epitrochlear adenopathy.   Skin:     General: Skin is warm and dry.   Neurological:      Mental Status: He is alert and oriented to person, place, and time.   Psychiatric:         Behavior: Behavior normal.         Results     FTs reveal no airway obstruction, no restriction, and a normal diffusion capacity    Chest x-ray reveals no effusions, infiltrates, or consolidation    Chest x-ray from 9/7/2023 revealed no evidence of active disease    Immunization History   Administered Date(s) Administered    COVID-19 (PFIZER) Purple Cap Monovalent 09/08/2021, 09/29/2021    Tdap 12/01/2011, 04/25/2019     Problem List       ICD-10-CM ICD-9-CM   1. Shortness of breath  R06.02 786.05   2. Obesity (BMI 30-39.9)  E66.9 278.00   3. Former smoker  Z87.891 V15.82       Discussion     We reviewed his test results which are unremarkable from a pulmonary standpoint  No evidence of obstructive or restrictive lung disease    At this point I see no contraindication to him undergoing general anesthesia or any surgery, in particular bariatric surgery.    I will plan to see him back on an as-needed basis    Quinton Faustin MD  Note electronically signed    CC: Lorena Ferraro, SHAQ

## 2024-02-05 DIAGNOSIS — R06.00 DYSPNEA, UNSPECIFIED TYPE: ICD-10-CM

## 2024-02-05 DIAGNOSIS — R53.83 FATIGUE, UNSPECIFIED TYPE: Primary | ICD-10-CM

## 2024-02-19 ENCOUNTER — LAB (OUTPATIENT)
Dept: LAB | Facility: HOSPITAL | Age: 29
End: 2024-02-19
Payer: COMMERCIAL

## 2024-02-19 DIAGNOSIS — R06.00 DYSPNEA, UNSPECIFIED TYPE: ICD-10-CM

## 2024-02-19 DIAGNOSIS — R53.83 FATIGUE, UNSPECIFIED TYPE: ICD-10-CM

## 2024-02-19 LAB
ALBUMIN SERPL-MCNC: 4.8 G/DL (ref 3.5–5.2)
ALBUMIN/GLOB SERPL: 1.8 G/DL
ALP SERPL-CCNC: 69 U/L (ref 39–117)
ALT SERPL W P-5'-P-CCNC: 45 U/L (ref 1–41)
ANION GAP SERPL CALCULATED.3IONS-SCNC: 9 MMOL/L (ref 5–15)
AST SERPL-CCNC: 24 U/L (ref 1–40)
BILIRUB SERPL-MCNC: 0.4 MG/DL (ref 0–1.2)
BUN SERPL-MCNC: 9 MG/DL (ref 6–20)
BUN/CREAT SERPL: 10.1 (ref 7–25)
CALCIUM SPEC-SCNC: 10 MG/DL (ref 8.6–10.5)
CHLORIDE SERPL-SCNC: 104 MMOL/L (ref 98–107)
CO2 SERPL-SCNC: 27 MMOL/L (ref 22–29)
CREAT SERPL-MCNC: 0.89 MG/DL (ref 0.76–1.27)
DEPRECATED RDW RBC AUTO: 39.7 FL (ref 37–54)
EGFRCR SERPLBLD CKD-EPI 2021: 118.2 ML/MIN/1.73
ERYTHROCYTE [DISTWIDTH] IN BLOOD BY AUTOMATED COUNT: 12.2 % (ref 12.3–15.4)
GLOBULIN UR ELPH-MCNC: 2.6 GM/DL
GLUCOSE SERPL-MCNC: 115 MG/DL (ref 65–99)
HCT VFR BLD AUTO: 47.7 % (ref 37.5–51)
HGB BLD-MCNC: 16.4 G/DL (ref 13–17.7)
MCH RBC QN AUTO: 31.3 PG (ref 26.6–33)
MCHC RBC AUTO-ENTMCNC: 34.4 G/DL (ref 31.5–35.7)
MCV RBC AUTO: 91 FL (ref 79–97)
PLATELET # BLD AUTO: 312 10*3/MM3 (ref 140–450)
PMV BLD AUTO: 10.8 FL (ref 6–12)
POTASSIUM SERPL-SCNC: 4.1 MMOL/L (ref 3.5–5.2)
PROT SERPL-MCNC: 7.4 G/DL (ref 6–8.5)
RBC # BLD AUTO: 5.24 10*6/MM3 (ref 4.14–5.8)
SODIUM SERPL-SCNC: 140 MMOL/L (ref 136–145)
WBC NRBC COR # BLD AUTO: 7.71 10*3/MM3 (ref 3.4–10.8)

## 2024-02-19 PROCEDURE — 36415 COLL VENOUS BLD VENIPUNCTURE: CPT

## 2024-02-19 PROCEDURE — 85027 COMPLETE CBC AUTOMATED: CPT

## 2024-02-19 PROCEDURE — 80053 COMPREHEN METABOLIC PANEL: CPT

## 2024-02-20 ENCOUNTER — CONSULT (OUTPATIENT)
Dept: BARIATRICS/WEIGHT MGMT | Facility: CLINIC | Age: 29
End: 2024-02-20

## 2024-02-20 VITALS
TEMPERATURE: 97.8 F | WEIGHT: 302.5 LBS | HEIGHT: 72 IN | HEART RATE: 82 BPM | DIASTOLIC BLOOD PRESSURE: 84 MMHG | BODY MASS INDEX: 40.97 KG/M2 | SYSTOLIC BLOOD PRESSURE: 132 MMHG | RESPIRATION RATE: 16 BRPM | OXYGEN SATURATION: 98 %

## 2024-02-20 DIAGNOSIS — E66.01 MORBID OBESITY WITH BODY MASS INDEX OF 40.0-44.9 IN ADULT: Primary | ICD-10-CM

## 2024-02-20 PROCEDURE — 99214 OFFICE O/P EST MOD 30 MIN: CPT | Performed by: SURGERY

## 2024-02-20 RX ORDER — PANTOPRAZOLE SODIUM 40 MG/10ML
40 INJECTION, POWDER, LYOPHILIZED, FOR SOLUTION INTRAVENOUS ONCE
Status: CANCELLED | OUTPATIENT
Start: 2024-02-20 | End: 2024-02-20

## 2024-02-20 RX ORDER — ENOXAPARIN SODIUM 100 MG/ML
40 INJECTION SUBCUTANEOUS ONCE
Status: CANCELLED | OUTPATIENT
Start: 2024-02-20 | End: 2024-02-20

## 2024-02-20 RX ORDER — SODIUM CHLORIDE 0.9 % (FLUSH) 0.9 %
3-10 SYRINGE (ML) INJECTION AS NEEDED
Status: CANCELLED | OUTPATIENT
Start: 2024-02-20

## 2024-02-20 RX ORDER — SODIUM CHLORIDE 9 MG/ML
40 INJECTION, SOLUTION INTRAVENOUS AS NEEDED
Status: CANCELLED | OUTPATIENT
Start: 2024-02-20

## 2024-02-20 RX ORDER — SCOLOPAMINE TRANSDERMAL SYSTEM 1 MG/1
1 PATCH, EXTENDED RELEASE TRANSDERMAL ONCE
Status: CANCELLED | OUTPATIENT
Start: 2024-02-20 | End: 2024-02-20

## 2024-02-20 RX ORDER — SODIUM CHLORIDE, SODIUM LACTATE, POTASSIUM CHLORIDE, CALCIUM CHLORIDE 600; 310; 30; 20 MG/100ML; MG/100ML; MG/100ML; MG/100ML
150 INJECTION, SOLUTION INTRAVENOUS CONTINUOUS
Status: CANCELLED | OUTPATIENT
Start: 2024-02-20

## 2024-02-20 RX ORDER — ACETAMINOPHEN 500 MG
1000 TABLET ORAL ONCE
Status: CANCELLED | OUTPATIENT
Start: 2024-02-20 | End: 2024-02-20

## 2024-02-20 RX ORDER — CHLORHEXIDINE GLUCONATE ORAL RINSE 1.2 MG/ML
30 SOLUTION DENTAL
Status: CANCELLED | OUTPATIENT
Start: 2024-02-20 | End: 2024-02-20

## 2024-02-20 RX ORDER — GABAPENTIN 100 MG/1
600 CAPSULE ORAL ONCE
Status: CANCELLED | OUTPATIENT
Start: 2024-02-20 | End: 2024-02-20

## 2024-02-20 RX ORDER — SODIUM CHLORIDE 0.9 % (FLUSH) 0.9 %
3 SYRINGE (ML) INJECTION EVERY 12 HOURS SCHEDULED
Status: CANCELLED | OUTPATIENT
Start: 2024-02-20

## 2024-02-20 NOTE — PROGRESS NOTES
[facility] GROUP BARIATRIC SURGERY  [address]  [address]  [facility] [id number] [phone]      Patient  Name:  Estuardo Crooks  :  1995      Date of Visit: 24    Chief Complaint:  weight gain; unable to maintain weight loss.   Evaluate for possible metabolic and bariatric surgery    History of Present Illness:  Estuardo Crooks is a 28 y.o. male who presents today for evaluation, education and consultation regarding metabolic and bariatric surgery (MBS).  Since last seen 2024 he has lost 1-1/2 pounds.  The patient returns for final visit prior to metabolic and bariatric surgery specifically the sleeve gastrectomy.  Original intake evaluation Mamta QURESHI PA-C dated 2023 reviewed.  She notes the patient's maximum lifetime weight at that time was 296 pounds (302.5 pounds in the office today) and that he is self paying for this procedure and he has episodic heartburn without medication and no prior evaluation no other GI complaints has history of hyperlipidemia former smoker chest pain without prior cardiac evaluation anxiety and depression.      The patient has had issues with morbid obesity for years and only temporary success with non-surgical methods of weight loss.  The patient is seeking LSG to help with the morbid obesity related conditions of anxiety and depression, chest pain, elevated ALT, former smoker, GE reflux disease, history of MRSA skin infection, history of nicotine vaping, hyperlipidemia, shoulder pain, snoring, urinary urgency.    28-year-old male from [facility].  He came to informed consent last evening, says he found it very helpful and wishes he could videotape it.  He says his usual way of losing weight is to starve himself or eat a lot of salad.  We discussed his very poor dietary habits noted on dietitian evaluation.  He thinks his BMI is not as high as expected given his dietary habits because he is fairly active.  His wife is with  him for today's evaluation but she was not there last night.  He s aware of the special circumstances of paying out of pocket for this procedure and BLIS selective complication protectionand still wishes to proceed.  He denies personal or family history of bleeding or clotting disorders.  He continues to vape nicotine products and is aware that he will need to be tobacco, nicotine and secondhand smoke (he confirms he will be secondhand smoke free in his workplace as well) free 2 weeks prior and 6 weeks after sleeve gastrectomy to minimize the risk of delayed leak and says he will comply. Denies gallbladder symptoms.         Past Medical History:   Diagnosis Date    Anxiety     Chest pain     Depression     Elevated ALT measurement     Former smoker     quit in 2021    Former smoker     GERD (gastroesophageal reflux disease)     episodic; no meds; EGD Dr. Koch 12/23    History of MRSA infection     skin infection    History of nicotine vapor product use     Hyperlipidemia     Obesity     Shoulder pain     no meds; previous injury    Snoring     Urinary urgency      Past Surgical History:   Procedure Laterality Date    EAR TUBES  2000    ROOT CANAL  02/2020       No Known Allergies    Current Outpatient Medications:     Cholecalciferol 25 MCG (1000 UT) tablet, Take 1 tablet by mouth Daily., Disp: , Rfl:     multivitamin with minerals tablet tablet, Take 1 tablet by mouth Daily., Disp: , Rfl:     QUEtiapine (SEROquel) 100 MG tablet, Take 1 tablet by mouth Every Night., Disp: 90 tablet, Rfl: 0    sertraline (ZOLOFT) 100 MG tablet, Take 1 tablet by mouth Daily., Disp: 90 tablet, Rfl: 0    Social History     Socioeconomic History    Marital status:    Tobacco Use    Smoking status: Former     Packs/day: 0.50     Years: 5.00     Additional pack years: 0.00     Total pack years: 2.50     Types: Cigarettes     Quit date: 1/24/2021     Years since quitting: 3.0    Smokeless tobacco: Former     Quit date: 1/24/2021    Vaping Use    Vaping Use: Former    Quit date: 1/8/2022    Substances: Nicotine    Devices: Pre-filled or refillable cartridge, Refillable tank    Passive vaping exposure: Yes   Substance and Sexual Activity    Alcohol use: Not Currently     Alcohol/week: 1.0 - 3.0 standard drink of alcohol     Types: 1 - 3 Drinks containing 0.5 oz of alcohol per week     Comment: occasional social use    Drug use: No    Sexual activity: Yes     Partners: Female     Family History   Problem Relation Age of Onset    Diabetes Mother         Pre Diabetes    Obesity Mother     Hypertension Mother     Sleep apnea Mother     Sleep apnea Father     Hypertension Father     Obesity Father     Arthritis Father     Colon polyps Father     Diabetes Father         Pre diabetic    Sleep apnea Sister     Hypertension Sister     Obesity Sister     Diabetes Sister         Pre diabetic    Drug abuse Sister     Sleep apnea Maternal Grandmother     Hypertension Maternal Grandmother     Obesity Maternal Grandmother     Heart attack Maternal Grandmother     COPD Maternal Grandmother     Alcohol abuse Maternal Grandmother     Heart disease Maternal Grandmother     Heart disease Maternal Grandfather     Sleep apnea Maternal Grandfather     Hypertension Maternal Grandfather     Obesity Maternal Grandfather     Diabetes Maternal Grandfather     Heart attack Paternal Grandmother     Heart disease Paternal Grandmother     Hypertension Paternal Grandmother     Obesity Paternal Grandmother     Sleep apnea Paternal Grandmother        Review of Systems   Constitutional:  Positive for fatigue and unexpected weight gain. Negative for chills, diaphoresis, fever and unexpected weight loss.   HENT:  Negative for congestion and facial swelling.    Eyes:  Negative for blurred vision, double vision and discharge.   Respiratory:  Negative for chest tightness, shortness of breath and stridor.    Cardiovascular:  Negative for chest pain, palpitations and leg swelling.    Gastrointestinal:  Positive for GERD. Negative for blood in stool.   Endocrine: Negative for polydipsia.   Genitourinary:  Positive for urgency. Negative for hematuria.   Musculoskeletal:  Positive for arthralgias.   Skin:  Negative for color change.   Allergic/Immunologic: Negative for immunocompromised state.   Neurological:  Negative for confusion.   Psychiatric/Behavioral:  Positive for sleep disturbance. Negative for self-injury.        I have reviewed the ROS and confirm that it's accurate today.    Physical Exam:  Vital Signs:  Weight: (!) 137 kg (302 lb 8 oz)   Body mass index is 41.03 kg/m².  Temp: 97.8 °F (36.6 °C)   Heart Rate: 82   BP: 132/84     Physical Exam  Vitals reviewed.   Constitutional:       Appearance: He is well-developed.   HENT:      Head: Normocephalic and atraumatic.      Nose: Nose normal.   Eyes:      Conjunctiva/sclera: Conjunctivae normal.      Pupils: Pupils are equal, round, and reactive to light.   Neck:      Thyroid: No thyromegaly.      Vascular: No carotid bruit.      Trachea: No tracheal deviation.   Cardiovascular:      Rate and Rhythm: Normal rate and regular rhythm.      Heart sounds: Normal heart sounds.   Pulmonary:      Effort: Pulmonary effort is normal. No respiratory distress.      Breath sounds: Normal breath sounds.   Abdominal:      General: There is no distension.      Palpations: Abdomen is soft.      Tenderness: There is no abdominal tenderness.   Musculoskeletal:         General: No deformity. Normal range of motion.      Cervical back: Normal range of motion and neck supple.   Skin:     General: Skin is warm and dry.      Findings: No rash.   Neurological:      Mental Status: He is alert and oriented to person, place, and time.      Cranial Nerves: No cranial nerve deficit.      Coordination: Coordination normal.   Psychiatric:         Behavior: Behavior normal.         Thought Content: Thought content normal.         Judgment: Judgment normal.          Patient Active Problem List   Diagnosis    Major depressive disorder with single episode    Other insomnia    Encounter for health maintenance examination    Obesity (BMI 30-39.9)    Viral URI    Hyperlipidemia    GERD (gastroesophageal reflux disease)    Anxiety    Depression    Chest pain    Snoring    Former smoker    Increased liver enzymes       Assessment:    Estuardo Crooks is a 28 y.o. year old male with medically complicated obesity.    Metabolic and bariatric surgery is deemed medically necessary given the following obesity related comorbidities including anxiety and depression, chest pain, elevated ALT, former smoker, GE reflux disease, history of MRSA skin infection, history of nicotine vaping, hyperlipidemia, shoulder pain, snoring, urinary urgency with current Weight: (!) 137 kg (302 lb 8 oz) and Body mass index is 41.03 kg/m²..    Patient is aware that surgery is a tool, and that weight loss and improvement in comorbidities is not guaranteed but only seen in the context of appropriate use, follow up and physical activity.    The patient was present for an approximately a 2.5 hour discussion of the purpose of MBS, how MBS is a tool to assist in achieving weight loss goals, the most common complications and how best to avoid them, and the strategies for short and long term weight loss and improvement in comorbidities.  Ample opportunity to discuss questions was available both in group and during the time of individual examination.    I reviewed his Yoan report which is negative.Labs dated 2/19/2024 unremarkable CMP except for glucose of 115 ALT of 45 unremarkable CBC of note hemoglobin 16.4 low RDW.  EGD Dr. Koch dated 12/18/2023 showing a weak GE junction roughly 39 to 40 cm from incisors perhaps some mild changes of eosinophilic esophagitis.  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  Pathology of the antrum showed mild chronic inactive gastritis negative for H. pylori distal esophageal biopsies were suggestive of reflux with up to 4 eosinophils per high-power field mid esophageal biopsies showed reactive changes with up to 2 eosinophils per high-power field.  Pulmonary function test dated 1/31/2024 FVC 86 FEV1 90 DLCO 91 no bronchodilators given.  Cardiology clearance dated 12/4/2023 Srinivasan Acosta MD saying he can proceed with bariatric surgery without further CV evaluation.  He notes EKG shows an inferior MI pattern.  Echo social evaluation dated 11/14/2023 Allyssa OLIVAS PhD good candidate.  Dietitian evaluation dated 11/14/2023 [place] PERDOMO RD noting that protein intake is deficient to ensure successful weight loss and that intake of processed and simple carbohydrates is very high due to sweet tea meals out and Zebra cakes daily and his balance and variety of fruits and vegetables is deficient and his reliance on restaurant fast food is very high usually 1 or 2 fast food meals a day with another restaurant for dinner and that his ingestion of sweet beverages such as soda sweet tea and fruit juices excessive especially sweet tea.  Labs dated 11/14/2023 negative H. pylori breath test normal TSH unremarkable CBC hemoglobin 15 unremarkable CMP except for an ALT of 51 lipid panel normal except for cholesterol 204 triglycerides 201  normal hemoglobin A1c.  Pulmonary clearance dated 1/31/2024 [place]  "Abel Faustin MD noting no contraindication to him undergoing general anesthesia or any surgery in particular bariatric surgery.  He notes the pulmonary function test show no evidence of obstructive or restrictive lung disease.  Please see scanned records that I have reviewed and signed off on today.  All of this in addition to the patient's unique history and exam has been taken into consideration in determining their appropriate candidacy for MBS.    Complications  of laparoscopic/possible robotic gastric sleeve were discussed. The patient is well aware of the potential complications of surgery that include but not limited to bleeding, infections, deep venous thrombosis, pulmonary embolism, pulmonary complications such as pneumonia, cardiac events, hernias, small bowel obstruction, damage to the spleen or other organs, bowel injury, disfiguring scars, failure to lose weight, need for additional surgery, conversion to an open procedure, and death. Patient is also aware of complications which apply in this particular procedure that can include but are not limited to a \"leak\" at the staple line which in some instances may require conversion to gastric bypass.    The patient is aware if a hiatal hernia is encountered, it likely will be repaired.  R/B/A Rx to hiatal hernia repair were discussed as outlined in our long consent form.  Briefly risks in addition to those for LSG include recurrent hernia, SHERRIE, dysphagia, esophageal injury, pneumothorax, injury to the vagus nerves, injury to the thoracic duct, aorta or vena cava.    I discussed avoiding all tobacco products, nicotine,  and second hand smoke at least 2 weeks pre-operatively and 6 weeks post-operatively to minimize the risk of sleeve leak.  This included discussing the importance of avoiding even secondhand smoke as the risk of leak is increased.  Examples discussed:  Avoid going in a house or riding in a car where someone has previously smoked in the last " 2 weeks and for 6 weeks postoperatively.  Avoid living in a house where someone smokes (even if it's in a separate room/patio/attached garage, etc.).   Avoid congregating with a group of people who are smoking even if it's outside.  It is OK to be around wood burning fires and barbecue.  I explained that I do not know if marijuana has a same effects but my overall recommendation is to avoid it for 2 weeks prior in 6 weeks after surgery.     Discussed the risks, benefits and alternative therapies at great length as outlined in our extensive consent forms, consent videos, and educational teaching process under the direction of the center's .    A copy of the patient's signed informed consent is on file.    R/B/A Rx discussed to postop anticoagulation incl but not limited to bleeding, drug reaction, venothromboembolic events, etc. and the patient declined.        Plan:    After evaluation today I think the patient is a reasonable candidate for laparoscopic sleeve gastrectomy and EGD.  Self-pay issues as above.  He is to schedule surgery at least 2 weeks after he discontinues nicotine vaping and not exposed to tobacco, nicotine and secondhand smoke as above.  He will be at increased risk for delayed leak should he not comply with the above.    Other issues include anxiety and depression, chest pain, elevated ALT, former smoker, GE reflux disease, history of MRSA skin infection, history of nicotine vaping, hyperlipidemia, shoulder pain, snoring, urinary urgency      Thank you Lorena NEW for the opportunity to evaluate Mr. Crooks.      Ghassan Koch MD              Answers submitted by the patient for this visit:  Primary Reason for Visit (Submitted on 2/16/2024)  What is the primary reason for your visit?: Other  Other (Submitted on 2/16/2024)  Please describe your symptoms.: Obesity.  Have you had these symptoms before?: Yes  How long have you been having these symptoms?: Greater than 2  weeks

## 2024-02-20 NOTE — H&P (VIEW-ONLY)
[facility] GROUP BARIATRIC SURGERY  [address]  [address]  [facility] [id number] [phone]      Patient  Name:  Estuardo Crooks  :  1995      Date of Visit: 24    Chief Complaint:  weight gain; unable to maintain weight loss.   Evaluate for possible metabolic and bariatric surgery    History of Present Illness:  Estuardo Crooks is a 28 y.o. male who presents today for evaluation, education and consultation regarding metabolic and bariatric surgery (MBS).  Since last seen 2024 he has lost 1-1/2 pounds.  The patient returns for final visit prior to metabolic and bariatric surgery specifically the sleeve gastrectomy.  Original intake evaluation Mamta QURESHI PA-C dated 2023 reviewed.  She notes the patient's maximum lifetime weight at that time was 296 pounds (302.5 pounds in the office today) and that he is self paying for this procedure and he has episodic heartburn without medication and no prior evaluation no other GI complaints has history of hyperlipidemia former smoker chest pain without prior cardiac evaluation anxiety and depression.      The patient has had issues with morbid obesity for years and only temporary success with non-surgical methods of weight loss.  The patient is seeking LSG to help with the morbid obesity related conditions of anxiety and depression, chest pain, elevated ALT, former smoker, GE reflux disease, history of MRSA skin infection, history of nicotine vaping, hyperlipidemia, shoulder pain, snoring, urinary urgency.    28-year-old male from [facility].  He came to informed consent last evening, says he found it very helpful and wishes he could videotape it.  He says his usual way of losing weight is to starve himself or eat a lot of salad.  We discussed his very poor dietary habits noted on dietitian evaluation.  He thinks his BMI is not as high as expected given his dietary habits because he is fairly active.  His wife is with  him for today's evaluation but she was not there last night.  He s aware of the special circumstances of paying out of pocket for this procedure and BLIS selective complication protectionand still wishes to proceed.  He denies personal or family history of bleeding or clotting disorders.  He continues to vape nicotine products and is aware that he will need to be tobacco, nicotine and secondhand smoke (he confirms he will be secondhand smoke free in his workplace as well) free 2 weeks prior and 6 weeks after sleeve gastrectomy to minimize the risk of delayed leak and says he will comply. Denies gallbladder symptoms.         Past Medical History:   Diagnosis Date    Anxiety     Chest pain     Depression     Elevated ALT measurement     Former smoker     quit in 2021    Former smoker     GERD (gastroesophageal reflux disease)     episodic; no meds; EGD Dr. Koch 12/23    History of MRSA infection     skin infection    History of nicotine vapor product use     Hyperlipidemia     Obesity     Shoulder pain     no meds; previous injury    Snoring     Urinary urgency      Past Surgical History:   Procedure Laterality Date    EAR TUBES  2000    ROOT CANAL  02/2020       No Known Allergies    Current Outpatient Medications:     Cholecalciferol 25 MCG (1000 UT) tablet, Take 1 tablet by mouth Daily., Disp: , Rfl:     multivitamin with minerals tablet tablet, Take 1 tablet by mouth Daily., Disp: , Rfl:     QUEtiapine (SEROquel) 100 MG tablet, Take 1 tablet by mouth Every Night., Disp: 90 tablet, Rfl: 0    sertraline (ZOLOFT) 100 MG tablet, Take 1 tablet by mouth Daily., Disp: 90 tablet, Rfl: 0    Social History     Socioeconomic History    Marital status:    Tobacco Use    Smoking status: Former     Packs/day: 0.50     Years: 5.00     Additional pack years: 0.00     Total pack years: 2.50     Types: Cigarettes     Quit date: 1/24/2021     Years since quitting: 3.0    Smokeless tobacco: Former     Quit date: 1/24/2021    Vaping Use    Vaping Use: Former    Quit date: 1/8/2022    Substances: Nicotine    Devices: Pre-filled or refillable cartridge, Refillable tank    Passive vaping exposure: Yes   Substance and Sexual Activity    Alcohol use: Not Currently     Alcohol/week: 1.0 - 3.0 standard drink of alcohol     Types: 1 - 3 Drinks containing 0.5 oz of alcohol per week     Comment: occasional social use    Drug use: No    Sexual activity: Yes     Partners: Female     Family History   Problem Relation Age of Onset    Diabetes Mother         Pre Diabetes    Obesity Mother     Hypertension Mother     Sleep apnea Mother     Sleep apnea Father     Hypertension Father     Obesity Father     Arthritis Father     Colon polyps Father     Diabetes Father         Pre diabetic    Sleep apnea Sister     Hypertension Sister     Obesity Sister     Diabetes Sister         Pre diabetic    Drug abuse Sister     Sleep apnea Maternal Grandmother     Hypertension Maternal Grandmother     Obesity Maternal Grandmother     Heart attack Maternal Grandmother     COPD Maternal Grandmother     Alcohol abuse Maternal Grandmother     Heart disease Maternal Grandmother     Heart disease Maternal Grandfather     Sleep apnea Maternal Grandfather     Hypertension Maternal Grandfather     Obesity Maternal Grandfather     Diabetes Maternal Grandfather     Heart attack Paternal Grandmother     Heart disease Paternal Grandmother     Hypertension Paternal Grandmother     Obesity Paternal Grandmother     Sleep apnea Paternal Grandmother        Review of Systems   Constitutional:  Positive for fatigue and unexpected weight gain. Negative for chills, diaphoresis, fever and unexpected weight loss.   HENT:  Negative for congestion and facial swelling.    Eyes:  Negative for blurred vision, double vision and discharge.   Respiratory:  Negative for chest tightness, shortness of breath and stridor.    Cardiovascular:  Negative for chest pain, palpitations and leg swelling.    Gastrointestinal:  Positive for GERD. Negative for blood in stool.   Endocrine: Negative for polydipsia.   Genitourinary:  Positive for urgency. Negative for hematuria.   Musculoskeletal:  Positive for arthralgias.   Skin:  Negative for color change.   Allergic/Immunologic: Negative for immunocompromised state.   Neurological:  Negative for confusion.   Psychiatric/Behavioral:  Positive for sleep disturbance. Negative for self-injury.        I have reviewed the ROS and confirm that it's accurate today.    Physical Exam:  Vital Signs:  Weight: (!) 137 kg (302 lb 8 oz)   Body mass index is 41.03 kg/m².  Temp: 97.8 °F (36.6 °C)   Heart Rate: 82   BP: 132/84     Physical Exam  Vitals reviewed.   Constitutional:       Appearance: He is well-developed.   HENT:      Head: Normocephalic and atraumatic.      Nose: Nose normal.   Eyes:      Conjunctiva/sclera: Conjunctivae normal.      Pupils: Pupils are equal, round, and reactive to light.   Neck:      Thyroid: No thyromegaly.      Vascular: No carotid bruit.      Trachea: No tracheal deviation.   Cardiovascular:      Rate and Rhythm: Normal rate and regular rhythm.      Heart sounds: Normal heart sounds.   Pulmonary:      Effort: Pulmonary effort is normal. No respiratory distress.      Breath sounds: Normal breath sounds.   Abdominal:      General: There is no distension.      Palpations: Abdomen is soft.      Tenderness: There is no abdominal tenderness.   Musculoskeletal:         General: No deformity. Normal range of motion.      Cervical back: Normal range of motion and neck supple.   Skin:     General: Skin is warm and dry.      Findings: No rash.   Neurological:      Mental Status: He is alert and oriented to person, place, and time.      Cranial Nerves: No cranial nerve deficit.      Coordination: Coordination normal.   Psychiatric:         Behavior: Behavior normal.         Thought Content: Thought content normal.         Judgment: Judgment normal.          Patient Active Problem List   Diagnosis    Major depressive disorder with single episode    Other insomnia    Encounter for health maintenance examination    Obesity (BMI 30-39.9)    Viral URI    Hyperlipidemia    GERD (gastroesophageal reflux disease)    Anxiety    Depression    Chest pain    Snoring    Former smoker    Increased liver enzymes       Assessment:    Estuardo Crooks is a 28 y.o. year old male with medically complicated obesity.    Metabolic and bariatric surgery is deemed medically necessary given the following obesity related comorbidities including anxiety and depression, chest pain, elevated ALT, former smoker, GE reflux disease, history of MRSA skin infection, history of nicotine vaping, hyperlipidemia, shoulder pain, snoring, urinary urgency with current Weight: (!) 137 kg (302 lb 8 oz) and Body mass index is 41.03 kg/m²..    Patient is aware that surgery is a tool, and that weight loss and improvement in comorbidities is not guaranteed but only seen in the context of appropriate use, follow up and physical activity.    The patient was present for an approximately a 2.5 hour discussion of the purpose of MBS, how MBS is a tool to assist in achieving weight loss goals, the most common complications and how best to avoid them, and the strategies for short and long term weight loss and improvement in comorbidities.  Ample opportunity to discuss questions was available both in group and during the time of individual examination.    I reviewed his Yoan report which is negative.Labs dated 2/19/2024 unremarkable CMP except for glucose of 115 ALT of 45 unremarkable CBC of note hemoglobin 16.4 low RDW.  EGD Dr. Koch dated 12/18/2023 showing a weak GE junction roughly 39 to 40 cm from incisors perhaps some mild changes of eosinophilic esophagitis.  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  Pathology of the antrum showed mild chronic inactive gastritis negative for H. pylori distal esophageal biopsies were suggestive of reflux with up to 4 eosinophils per high-power field mid esophageal biopsies showed reactive changes with up to 2 eosinophils per high-power field.  Pulmonary function test dated 1/31/2024 FVC 86 FEV1 90 DLCO 91 no bronchodilators given.  Cardiology clearance dated 12/4/2023 Srinivasan Acosta MD saying he can proceed with bariatric surgery without further CV evaluation.  He notes EKG shows an inferior MI pattern.  Echo social evaluation dated 11/14/2023 Allyssa OLIVAS PhD good candidate.  Dietitian evaluation dated 11/14/2023 Marietta PERDOMO RD noting that protein intake is deficient to ensure successful weight loss and that intake of processed and simple carbohydrates is very high due to sweet tea meals out and Zebra cakes daily and his balance and variety of fruits and vegetables is deficient and his reliance on restaurant fast food is very high usually 1 or 2 fast food meals a day with another restaurant for dinner and that his ingestion of sweet beverages such as soda sweet tea and fruit juices excessive especially sweet tea.  Labs dated 11/14/2023 negative H. pylori breath test normal TSH unremarkable CBC hemoglobin 15 unremarkable CMP except for an ALT of 51 lipid panel normal except for cholesterol 204 triglycerides 201  normal hemoglobin A1c.  Pulmonary clearance dated 1/31/2024 Quinton  "Abel Faustin MD noting no contraindication to him undergoing general anesthesia or any surgery in particular bariatric surgery.  He notes the pulmonary function test show no evidence of obstructive or restrictive lung disease.  Please see scanned records that I have reviewed and signed off on today.  All of this in addition to the patient's unique history and exam has been taken into consideration in determining their appropriate candidacy for MBS.    Complications  of laparoscopic/possible robotic gastric sleeve were discussed. The patient is well aware of the potential complications of surgery that include but not limited to bleeding, infections, deep venous thrombosis, pulmonary embolism, pulmonary complications such as pneumonia, cardiac events, hernias, small bowel obstruction, damage to the spleen or other organs, bowel injury, disfiguring scars, failure to lose weight, need for additional surgery, conversion to an open procedure, and death. Patient is also aware of complications which apply in this particular procedure that can include but are not limited to a \"leak\" at the staple line which in some instances may require conversion to gastric bypass.    The patient is aware if a hiatal hernia is encountered, it likely will be repaired.  R/B/A Rx to hiatal hernia repair were discussed as outlined in our long consent form.  Briefly risks in addition to those for LSG include recurrent hernia, SHERRIE, dysphagia, esophageal injury, pneumothorax, injury to the vagus nerves, injury to the thoracic duct, aorta or vena cava.    I discussed avoiding all tobacco products, nicotine,  and second hand smoke at least 2 weeks pre-operatively and 6 weeks post-operatively to minimize the risk of sleeve leak.  This included discussing the importance of avoiding even secondhand smoke as the risk of leak is increased.  Examples discussed:  Avoid going in a house or riding in a car where someone has previously smoked in the last " 2 weeks and for 6 weeks postoperatively.  Avoid living in a house where someone smokes (even if it's in a separate room/patio/attached garage, etc.).   Avoid congregating with a group of people who are smoking even if it's outside.  It is OK to be around wood burning fires and barbecue.  I explained that I do not know if marijuana has a same effects but my overall recommendation is to avoid it for 2 weeks prior in 6 weeks after surgery.     Discussed the risks, benefits and alternative therapies at great length as outlined in our extensive consent forms, consent videos, and educational teaching process under the direction of the center's .    A copy of the patient's signed informed consent is on file.    R/B/A Rx discussed to postop anticoagulation incl but not limited to bleeding, drug reaction, venothromboembolic events, etc. and the patient declined.        Plan:    After evaluation today I think the patient is a reasonable candidate for laparoscopic sleeve gastrectomy and EGD.  Self-pay issues as above.  He is to schedule surgery at least 2 weeks after he discontinues nicotine vaping and not exposed to tobacco, nicotine and secondhand smoke as above.  He will be at increased risk for delayed leak should he not comply with the above.    Other issues include anxiety and depression, chest pain, elevated ALT, former smoker, GE reflux disease, history of MRSA skin infection, history of nicotine vaping, hyperlipidemia, shoulder pain, snoring, urinary urgency      Thank you Lorena NEW for the opportunity to evaluate Mr. Crooks.      Ghassan Koch MD              Answers submitted by the patient for this visit:  Primary Reason for Visit (Submitted on 2/16/2024)  What is the primary reason for your visit?: Other  Other (Submitted on 2/16/2024)  Please describe your symptoms.: Obesity.  Have you had these symptoms before?: Yes  How long have you been having these symptoms?: Greater than 2  weeks

## 2024-02-22 PROBLEM — E66.01 MORBID OBESITY WITH BODY MASS INDEX OF 40.0-44.9 IN ADULT: Status: ACTIVE | Noted: 2024-02-20

## 2024-03-04 ENCOUNTER — OFFICE VISIT (OUTPATIENT)
Dept: CARDIOLOGY | Facility: CLINIC | Age: 29
End: 2024-03-04
Payer: COMMERCIAL

## 2024-03-04 ENCOUNTER — PRE-ADMISSION TESTING (OUTPATIENT)
Dept: PREADMISSION TESTING | Facility: HOSPITAL | Age: 29
End: 2024-03-04
Payer: COMMERCIAL

## 2024-03-04 VITALS
OXYGEN SATURATION: 97 % | BODY MASS INDEX: 40.9 KG/M2 | HEIGHT: 72 IN | HEART RATE: 87 BPM | DIASTOLIC BLOOD PRESSURE: 82 MMHG | SYSTOLIC BLOOD PRESSURE: 128 MMHG | WEIGHT: 302 LBS

## 2024-03-04 DIAGNOSIS — R94.31 ABNORMAL ECG: Primary | ICD-10-CM

## 2024-03-04 DIAGNOSIS — E66.01 MORBID OBESITY WITH BODY MASS INDEX OF 40.0-44.9 IN ADULT: ICD-10-CM

## 2024-03-04 LAB
ABO GROUP BLD: NORMAL
BLD GP AB SCN SERPL QL: NEGATIVE
DEPRECATED RDW RBC AUTO: 41.5 FL (ref 37–54)
ERYTHROCYTE [DISTWIDTH] IN BLOOD BY AUTOMATED COUNT: 12.1 % (ref 12.3–15.4)
HBA1C MFR BLD: 5.5 % (ref 4.8–5.6)
HCT VFR BLD AUTO: 49.2 % (ref 37.5–51)
HGB BLD-MCNC: 16.6 G/DL (ref 13–17.7)
MCH RBC QN AUTO: 31.4 PG (ref 26.6–33)
MCHC RBC AUTO-ENTMCNC: 33.7 G/DL (ref 31.5–35.7)
MCV RBC AUTO: 93 FL (ref 79–97)
MRSA DNA SPEC QL NAA+PROBE: NEGATIVE
PLATELET # BLD AUTO: 299 10*3/MM3 (ref 140–450)
PMV BLD AUTO: 9.4 FL (ref 6–12)
RBC # BLD AUTO: 5.29 10*6/MM3 (ref 4.14–5.8)
RH BLD: POSITIVE
T&S EXPIRATION DATE: NORMAL
WBC NRBC COR # BLD AUTO: 8.88 10*3/MM3 (ref 3.4–10.8)

## 2024-03-04 PROCEDURE — 86901 BLOOD TYPING SEROLOGIC RH(D): CPT

## 2024-03-04 PROCEDURE — 83036 HEMOGLOBIN GLYCOSYLATED A1C: CPT

## 2024-03-04 PROCEDURE — 85027 COMPLETE CBC AUTOMATED: CPT

## 2024-03-04 PROCEDURE — 99213 OFFICE O/P EST LOW 20 MIN: CPT | Performed by: INTERNAL MEDICINE

## 2024-03-04 PROCEDURE — 86850 RBC ANTIBODY SCREEN: CPT

## 2024-03-04 PROCEDURE — 87641 MR-STAPH DNA AMP PROBE: CPT

## 2024-03-04 PROCEDURE — 86900 BLOOD TYPING SEROLOGIC ABO: CPT

## 2024-03-04 PROCEDURE — 36415 COLL VENOUS BLD VENIPUNCTURE: CPT

## 2024-03-04 NOTE — PAT
An arrival time for procedure was not provided during PAT visit. If patient had any questions or concerns about their arrival time, they were instructed to contact their surgeon/physician.  Additionally, if the patient referred to an arrival time that was acquired from their my chart account, patient was encouraged to verify that time with their surgeon/physician. Arrival times are NOT provided in Pre Admission Testing Department.    Patient viewed general PAT education video as instructed in their preoperative information received from their surgeon.  Patient stated the general PAT education video was viewed in its entirety and survey completed.  Copies of PAT general education handouts (Incentive Spirometry, Meds to Beds Program, Patient Belongings, Pre-op skin preparation instructions, Blood Glucose testing, Visitor policy, Surgery FAQ, Code H) distributed to patient if not printed. Education related to the PAT pass and skin preparation for surgery (if applicable) completed in PAT as a reinforcement to PAT education video. Patient instructed to return PAT pass provided today as well as completed skin preparation sheet (if applicable) on the day of procedure.     Additionally if patient had not viewed video yet but intended to view it at home or in our waiting area, then referred them to the handout with QR code/link provided during PAT visit.  Instructed patient to complete survey after viewing the video in its entirety.  Encouraged patient/family to read PAT general education handouts thoroughly and notify PAT staff with any questions or concerns. Patient verbalized understanding of all information and priority content.    Patient instructed to drink 20 ounces of Gatorade or Gatorlyte (if diabetic) and it needs to be completed 1 hour (for Main OR patients) or 2 hours (scheduled  section & [facility] patients) before given arrival time for procedure (NO RED Gatorade and NO Gatorade Zero).    Patient  verbalized understanding.    Patient to apply Chlorhexadine wipes  to surgical area (as instructed) the night before procedure and the AM of procedure. Wipes provided.    Blood bank bracelet applied to patient during Pre Admission Testing visit.  Patient instructed not to remove from arm until after procedure and they are discharged from the hospital.  Explained to patient that they may shower and get the bracelet wet, but not to immerse under water for longer periods (bathing, swimming, hand dishwashing, etc).  Patient verbalized understanding.

## 2024-03-04 NOTE — PROGRESS NOTES
"Chief Complaint  Hyperlipidemia      Subjective   History of Present Illness    Problem List  -preop for bariatric surgery  -Mild TR  -EKG inferior MI pattern    Mr. Crooks is a 28 year old man with above hx.  No current CV complaints.  Few years ago had some left sided chest pain and radiation down left arm.  Eventually had it checked out expect told maybe had a virus and fluid around his heart.  Symptoms completely resolved and his able to get around ok without issue.  ROS negative except for the above.      Update 3/4/24  No complaints.  Echo normal except for mild TR       Objective   Vital Signs:  Vitals:    03/04/24 1313   BP: 128/82   Pulse: 87   SpO2: 97%     Estimated body mass index is 40.96 kg/m² as calculated from the following:    Height as of this encounter: 182.9 cm (72\").    Weight as of this encounter: 137 kg (302 lb).       Physical Exam  HENT:      Head: Normocephalic.   Eyes:      Extraocular Movements: Extraocular movements intact.   Cardiovascular:      Rate and Rhythm: Normal rate and regular rhythm.      Heart sounds: No murmur heard.     No gallop.   Pulmonary:      Breath sounds: Normal breath sounds.   Abdominal:      Palpations: Abdomen is soft.   Musculoskeletal:      Right lower leg: No edema.      Left lower leg: No edema.   Skin:     General: Skin is warm and dry.   Neurological:      General: No focal deficit present.      Mental Status: He is alert.   Psychiatric:         Mood and Affect: Mood normal.       Clinic discussed advanced directive        Assessment   -preop for bariatric surgery  -Mild TR  -EKG inferior MI pattern    Plan   Can proceed with bariatric surgery without further CV eval  Consider echo in 5-7 years    Return if symptoms worsen or fail to improve.  Srinivasan Acosta MD  12/04/2023 15:09 EST     "

## 2024-03-06 ENCOUNTER — ANESTHESIA EVENT (OUTPATIENT)
Dept: PERIOP | Facility: HOSPITAL | Age: 29
End: 2024-03-06

## 2024-03-06 RX ORDER — FAMOTIDINE 20 MG/1
20 TABLET, FILM COATED ORAL ONCE
Status: CANCELLED | OUTPATIENT
Start: 2024-03-06 | End: 2024-03-06

## 2024-03-06 RX ORDER — SODIUM CHLORIDE 9 MG/ML
40 INJECTION, SOLUTION INTRAVENOUS AS NEEDED
Status: CANCELLED | OUTPATIENT
Start: 2024-03-06

## 2024-03-06 RX ORDER — FAMOTIDINE 10 MG/ML
20 INJECTION, SOLUTION INTRAVENOUS ONCE
Status: CANCELLED | OUTPATIENT
Start: 2024-03-06 | End: 2024-03-06

## 2024-03-06 RX ORDER — SODIUM CHLORIDE 0.9 % (FLUSH) 0.9 %
10 SYRINGE (ML) INJECTION AS NEEDED
Status: CANCELLED | OUTPATIENT
Start: 2024-03-06

## 2024-03-06 RX ORDER — SODIUM CHLORIDE 0.9 % (FLUSH) 0.9 %
10 SYRINGE (ML) INJECTION EVERY 12 HOURS SCHEDULED
Status: CANCELLED | OUTPATIENT
Start: 2024-03-06

## 2024-03-07 ENCOUNTER — HOSPITAL ENCOUNTER (INPATIENT)
Facility: HOSPITAL | Age: 29
LOS: 1 days | Discharge: HOME | End: 2024-03-08
Attending: SURGERY | Admitting: SURGERY
Payer: COMMERCIAL

## 2024-03-07 ENCOUNTER — ANESTHESIA (OUTPATIENT)
Dept: PERIOP | Facility: HOSPITAL | Age: 29
End: 2024-03-07

## 2024-03-07 ENCOUNTER — ANESTHESIA EVENT CONVERTED (OUTPATIENT)
Dept: ANESTHESIOLOGY | Facility: HOSPITAL | Age: 29
End: 2024-03-07

## 2024-03-07 DIAGNOSIS — E66.01 MORBID OBESITY WITH BODY MASS INDEX OF 40.0-44.9 IN ADULT: ICD-10-CM

## 2024-03-07 DIAGNOSIS — E66.01 MORBID OBESITY WITH BODY MASS INDEX (BMI) OF 40.0 TO 44.9 IN ADULT: Primary | ICD-10-CM

## 2024-03-07 LAB
ABO GROUP BLD: NORMAL
RH BLD: POSITIVE

## 2024-03-07 PROCEDURE — 25810000003 LACTATED RINGERS SOLUTION: Performed by: SURGERY

## 2024-03-07 PROCEDURE — 25010000002 DEXAMETHASONE PER 1 MG: Performed by: NURSE ANESTHETIST, CERTIFIED REGISTERED

## 2024-03-07 PROCEDURE — 25010000002 HYDROMORPHONE 1 MG/ML SOLUTION

## 2024-03-07 PROCEDURE — 25010000002 ENOXAPARIN PER 10 MG: Performed by: SURGERY

## 2024-03-07 PROCEDURE — 0DB64Z3 EXCISION OF STOMACH, PERCUTANEOUS ENDOSCOPIC APPROACH, VERTICAL: ICD-10-PCS | Performed by: SURGERY

## 2024-03-07 PROCEDURE — 25010000002 THIAMINE PER 100 MG: Performed by: SURGERY

## 2024-03-07 PROCEDURE — 25010000002 SUGAMMADEX 200 MG/2ML SOLUTION: Performed by: NURSE ANESTHETIST, CERTIFIED REGISTERED

## 2024-03-07 PROCEDURE — 25010000002 CEFAZOLIN PER 500 MG: Performed by: SURGERY

## 2024-03-07 PROCEDURE — 94799 UNLISTED PULMONARY SVC/PX: CPT

## 2024-03-07 PROCEDURE — BD15YZZ FLUOROSCOPY OF UPPER GI USING OTHER CONTRAST: ICD-10-PCS | Performed by: SURGERY

## 2024-03-07 PROCEDURE — 0DJ08ZZ INSPECTION OF UPPER INTESTINAL TRACT, VIA NATURAL OR ARTIFICIAL OPENING ENDOSCOPIC: ICD-10-PCS | Performed by: SURGERY

## 2024-03-07 PROCEDURE — 25010000002 GLUCAGON (RDNA) PER 1 MG: Performed by: NURSE ANESTHETIST, CERTIFIED REGISTERED

## 2024-03-07 PROCEDURE — 25810000003 LACTATED RINGERS PER 1000 ML: Performed by: ANESTHESIOLOGY

## 2024-03-07 PROCEDURE — 25010000002 ONDANSETRON PER 1 MG: Performed by: NURSE ANESTHETIST, CERTIFIED REGISTERED

## 2024-03-07 PROCEDURE — 86900 BLOOD TYPING SEROLOGIC ABO: CPT

## 2024-03-07 PROCEDURE — 86901 BLOOD TYPING SEROLOGIC RH(D): CPT

## 2024-03-07 PROCEDURE — 25010000002 FENTANYL CITRATE (PF) 100 MCG/2ML SOLUTION: Performed by: NURSE ANESTHETIST, CERTIFIED REGISTERED

## 2024-03-07 PROCEDURE — 25010000002 BUPIVACAINE (PF) 0.25 % SOLUTION: Performed by: NURSE ANESTHETIST, CERTIFIED REGISTERED

## 2024-03-07 PROCEDURE — 43775 LAP SLEEVE GASTRECTOMY: CPT | Performed by: SURGERY

## 2024-03-07 PROCEDURE — 25010000002 FENTANYL CITRATE (PF) 50 MCG/ML SOLUTION

## 2024-03-07 PROCEDURE — 25010000002 AMISULPRIDE (ANTIEMETIC) 10 MG/4ML SOLUTION: Performed by: NURSE ANESTHETIST, CERTIFIED REGISTERED

## 2024-03-07 PROCEDURE — 25810000003 LACTATED RINGERS PER 1000 ML: Performed by: SURGERY

## 2024-03-07 PROCEDURE — 88307 TISSUE EXAM BY PATHOLOGIST: CPT | Performed by: SURGERY

## 2024-03-07 PROCEDURE — 25010000002 PROPOFOL 10 MG/ML EMULSION: Performed by: NURSE ANESTHETIST, CERTIFIED REGISTERED

## 2024-03-07 PROCEDURE — 25010000002 DEXAMETHASONE SODIUM PHOSPHATE 10 MG/ML SOLUTION: Performed by: NURSE ANESTHETIST, CERTIFIED REGISTERED

## 2024-03-07 DEVICE — IMPLANTABLE DEVICE
Type: IMPLANTABLE DEVICE | Site: ABDOMEN | Status: FUNCTIONAL
Brand: TITAN SGS STANDARD GASTRIC STAPLER

## 2024-03-07 RX ORDER — DEXMEDETOMIDINE HYDROCHLORIDE 100 UG/ML
INJECTION, SOLUTION INTRAVENOUS AS NEEDED
Status: DISCONTINUED | OUTPATIENT
Start: 2024-03-07 | End: 2024-03-07 | Stop reason: SURG

## 2024-03-07 RX ORDER — ALBUTEROL SULFATE 90 UG/1
AEROSOL, METERED RESPIRATORY (INHALATION) AS NEEDED
Status: DISCONTINUED | OUTPATIENT
Start: 2024-03-07 | End: 2024-03-07 | Stop reason: SURG

## 2024-03-07 RX ORDER — CEFAZOLIN SODIUM IN 0.9 % NACL 3 G/100 ML
3000 INTRAVENOUS SOLUTION, PIGGYBACK (ML) INTRAVENOUS EVERY 8 HOURS
Qty: 200 ML | Refills: 0 | Status: COMPLETED | OUTPATIENT
Start: 2024-03-07 | End: 2024-03-08

## 2024-03-07 RX ORDER — SERTRALINE HYDROCHLORIDE 100 MG/1
100 TABLET, FILM COATED ORAL DAILY
Status: DISCONTINUED | OUTPATIENT
Start: 2024-03-07 | End: 2024-03-08 | Stop reason: HOSPADM

## 2024-03-07 RX ORDER — METOCLOPRAMIDE HYDROCHLORIDE 5 MG/ML
10 INJECTION INTRAMUSCULAR; INTRAVENOUS EVERY 6 HOURS PRN
Status: DISCONTINUED | OUTPATIENT
Start: 2024-03-07 | End: 2024-03-08 | Stop reason: HOSPADM

## 2024-03-07 RX ORDER — LORAZEPAM 1 MG/1
1 TABLET ORAL EVERY 12 HOURS PRN
Status: DISCONTINUED | OUTPATIENT
Start: 2024-03-07 | End: 2024-03-08 | Stop reason: HOSPADM

## 2024-03-07 RX ORDER — ONDANSETRON 2 MG/ML
INJECTION INTRAMUSCULAR; INTRAVENOUS AS NEEDED
Status: DISCONTINUED | OUTPATIENT
Start: 2024-03-07 | End: 2024-03-07 | Stop reason: SURG

## 2024-03-07 RX ORDER — ONDANSETRON 4 MG/1
4 TABLET, ORALLY DISINTEGRATING ORAL EVERY 4 HOURS PRN
Status: DISCONTINUED | OUTPATIENT
Start: 2024-03-07 | End: 2024-03-07

## 2024-03-07 RX ORDER — DIPHENHYDRAMINE HYDROCHLORIDE 50 MG/ML
25 INJECTION INTRAMUSCULAR; INTRAVENOUS EVERY 4 HOURS PRN
Status: DISCONTINUED | OUTPATIENT
Start: 2024-03-07 | End: 2024-03-08 | Stop reason: HOSPADM

## 2024-03-07 RX ORDER — HYDROMORPHONE HYDROCHLORIDE 1 MG/ML
0.5 INJECTION, SOLUTION INTRAMUSCULAR; INTRAVENOUS; SUBCUTANEOUS
Status: DISCONTINUED | OUTPATIENT
Start: 2024-03-07 | End: 2024-03-07 | Stop reason: HOSPADM

## 2024-03-07 RX ORDER — HYDROMORPHONE HYDROCHLORIDE 2 MG/1
2 TABLET ORAL EVERY 4 HOURS PRN
Status: DISCONTINUED | OUTPATIENT
Start: 2024-03-07 | End: 2024-03-08 | Stop reason: HOSPADM

## 2024-03-07 RX ORDER — PANTOPRAZOLE SODIUM 40 MG/10ML
40 INJECTION, POWDER, LYOPHILIZED, FOR SOLUTION INTRAVENOUS ONCE
Status: COMPLETED | OUTPATIENT
Start: 2024-03-07 | End: 2024-03-07

## 2024-03-07 RX ORDER — IBUPROFEN 600 MG/1
TABLET ORAL AS NEEDED
Status: DISCONTINUED | OUTPATIENT
Start: 2024-03-07 | End: 2024-03-07 | Stop reason: SURG

## 2024-03-07 RX ORDER — ALPRAZOLAM 0.25 MG/1
0.25 TABLET ORAL ONCE AS NEEDED
Status: DISCONTINUED | OUTPATIENT
Start: 2024-03-07 | End: 2024-03-08 | Stop reason: HOSPADM

## 2024-03-07 RX ORDER — GABAPENTIN 100 MG/1
100 CAPSULE ORAL 3 TIMES DAILY
Status: DISCONTINUED | OUTPATIENT
Start: 2024-03-07 | End: 2024-03-08 | Stop reason: HOSPADM

## 2024-03-07 RX ORDER — FENTANYL CITRATE 50 UG/ML
INJECTION, SOLUTION INTRAMUSCULAR; INTRAVENOUS
Status: COMPLETED
Start: 2024-03-07 | End: 2024-03-07

## 2024-03-07 RX ORDER — SODIUM CHLORIDE, SODIUM LACTATE, POTASSIUM CHLORIDE, CALCIUM CHLORIDE 600; 310; 30; 20 MG/100ML; MG/100ML; MG/100ML; MG/100ML
150 INJECTION, SOLUTION INTRAVENOUS CONTINUOUS
Status: DISCONTINUED | OUTPATIENT
Start: 2024-03-07 | End: 2024-03-07

## 2024-03-07 RX ORDER — NALOXONE HCL 0.4 MG/ML
0.4 VIAL (ML) INJECTION AS NEEDED
Status: DISCONTINUED | OUTPATIENT
Start: 2024-03-07 | End: 2024-03-07 | Stop reason: HOSPADM

## 2024-03-07 RX ORDER — DEXAMETHASONE SODIUM PHOSPHATE 10 MG/ML
INJECTION, SOLUTION INTRAMUSCULAR; INTRAVENOUS
Status: COMPLETED | OUTPATIENT
Start: 2024-03-07 | End: 2024-03-07

## 2024-03-07 RX ORDER — PROMETHAZINE HYDROCHLORIDE 25 MG/1
25 TABLET ORAL ONCE AS NEEDED
Status: DISCONTINUED | OUTPATIENT
Start: 2024-03-07 | End: 2024-03-07 | Stop reason: HOSPADM

## 2024-03-07 RX ORDER — SODIUM CHLORIDE, SODIUM LACTATE, POTASSIUM CHLORIDE, CALCIUM CHLORIDE 600; 310; 30; 20 MG/100ML; MG/100ML; MG/100ML; MG/100ML
150 INJECTION, SOLUTION INTRAVENOUS CONTINUOUS
Status: DISCONTINUED | OUTPATIENT
Start: 2024-03-07 | End: 2024-03-08

## 2024-03-07 RX ORDER — OXYCODONE HYDROCHLORIDE 5 MG/1
5 TABLET ORAL EVERY 6 HOURS PRN
Status: DISCONTINUED | OUTPATIENT
Start: 2024-03-07 | End: 2024-03-08 | Stop reason: HOSPADM

## 2024-03-07 RX ORDER — PROPOFOL 10 MG/ML
VIAL (ML) INTRAVENOUS AS NEEDED
Status: DISCONTINUED | OUTPATIENT
Start: 2024-03-07 | End: 2024-03-07 | Stop reason: SURG

## 2024-03-07 RX ORDER — HYDRALAZINE HYDROCHLORIDE 20 MG/ML
5 INJECTION INTRAMUSCULAR; INTRAVENOUS
Status: DISCONTINUED | OUTPATIENT
Start: 2024-03-07 | End: 2024-03-07 | Stop reason: HOSPADM

## 2024-03-07 RX ORDER — SODIUM CHLORIDE 9 MG/ML
40 INJECTION, SOLUTION INTRAVENOUS AS NEEDED
Status: DISCONTINUED | OUTPATIENT
Start: 2024-03-07 | End: 2024-03-07 | Stop reason: HOSPADM

## 2024-03-07 RX ORDER — MORPHINE SULFATE 4 MG/ML
4 INJECTION, SOLUTION INTRAMUSCULAR; INTRAVENOUS
Status: DISCONTINUED | OUTPATIENT
Start: 2024-03-07 | End: 2024-03-08 | Stop reason: HOSPADM

## 2024-03-07 RX ORDER — FENTANYL CITRATE 50 UG/ML
50 INJECTION, SOLUTION INTRAMUSCULAR; INTRAVENOUS
Status: DISCONTINUED | OUTPATIENT
Start: 2024-03-07 | End: 2024-03-07 | Stop reason: HOSPADM

## 2024-03-07 RX ORDER — ENOXAPARIN SODIUM 100 MG/ML
INJECTION SUBCUTANEOUS AS NEEDED
Status: DISCONTINUED | OUTPATIENT
Start: 2024-03-07 | End: 2024-03-07 | Stop reason: HOSPADM

## 2024-03-07 RX ORDER — DROPERIDOL 2.5 MG/ML
0.62 INJECTION, SOLUTION INTRAMUSCULAR; INTRAVENOUS ONCE AS NEEDED
Status: DISCONTINUED | OUTPATIENT
Start: 2024-03-07 | End: 2024-03-07 | Stop reason: HOSPADM

## 2024-03-07 RX ORDER — SODIUM CHLORIDE, SODIUM LACTATE, POTASSIUM CHLORIDE, CALCIUM CHLORIDE 600; 310; 30; 20 MG/100ML; MG/100ML; MG/100ML; MG/100ML
9 INJECTION, SOLUTION INTRAVENOUS CONTINUOUS
Status: DISCONTINUED | OUTPATIENT
Start: 2024-03-07 | End: 2024-03-07

## 2024-03-07 RX ORDER — ALBUTEROL SULFATE 2.5 MG/3ML
2.5 SOLUTION RESPIRATORY (INHALATION) EVERY 4 HOURS PRN
Status: DISCONTINUED | OUTPATIENT
Start: 2024-03-07 | End: 2024-03-08 | Stop reason: HOSPADM

## 2024-03-07 RX ORDER — ONDANSETRON 2 MG/ML
4 INJECTION INTRAMUSCULAR; INTRAVENOUS ONCE AS NEEDED
Status: DISCONTINUED | OUTPATIENT
Start: 2024-03-07 | End: 2024-03-07 | Stop reason: HOSPADM

## 2024-03-07 RX ORDER — MIDAZOLAM HYDROCHLORIDE 1 MG/ML
1 INJECTION INTRAMUSCULAR; INTRAVENOUS
Status: DISCONTINUED | OUTPATIENT
Start: 2024-03-07 | End: 2024-03-07 | Stop reason: HOSPADM

## 2024-03-07 RX ORDER — SIMETHICONE 80 MG
80 TABLET,CHEWABLE ORAL 4 TIMES DAILY PRN
Status: DISCONTINUED | OUTPATIENT
Start: 2024-03-07 | End: 2024-03-08 | Stop reason: HOSPADM

## 2024-03-07 RX ORDER — ONDANSETRON 4 MG/1
4 TABLET, ORALLY DISINTEGRATING ORAL EVERY 6 HOURS PRN
Status: DISCONTINUED | OUTPATIENT
Start: 2024-03-11 | End: 2024-03-08 | Stop reason: HOSPADM

## 2024-03-07 RX ORDER — LIDOCAINE HYDROCHLORIDE 10 MG/ML
0.5 INJECTION, SOLUTION EPIDURAL; INFILTRATION; INTRACAUDAL; PERINEURAL ONCE AS NEEDED
Status: COMPLETED | OUTPATIENT
Start: 2024-03-07 | End: 2024-03-07

## 2024-03-07 RX ORDER — BUPIVACAINE HYDROCHLORIDE 2.5 MG/ML
INJECTION, SOLUTION EPIDURAL; INFILTRATION; INTRACAUDAL
Status: COMPLETED | OUTPATIENT
Start: 2024-03-07 | End: 2024-03-07

## 2024-03-07 RX ORDER — IPRATROPIUM BROMIDE AND ALBUTEROL SULFATE 2.5; .5 MG/3ML; MG/3ML
3 SOLUTION RESPIRATORY (INHALATION) ONCE AS NEEDED
Status: DISCONTINUED | OUTPATIENT
Start: 2024-03-07 | End: 2024-03-07 | Stop reason: HOSPADM

## 2024-03-07 RX ORDER — LIDOCAINE HYDROCHLORIDE 10 MG/ML
INJECTION, SOLUTION EPIDURAL; INFILTRATION; INTRACAUDAL; PERINEURAL AS NEEDED
Status: DISCONTINUED | OUTPATIENT
Start: 2024-03-07 | End: 2024-03-07 | Stop reason: SURG

## 2024-03-07 RX ORDER — BUPIVACAINE HYDROCHLORIDE AND EPINEPHRINE 5; 5 MG/ML; UG/ML
INJECTION, SOLUTION PERINEURAL AS NEEDED
Status: DISCONTINUED | OUTPATIENT
Start: 2024-03-07 | End: 2024-03-07 | Stop reason: HOSPADM

## 2024-03-07 RX ORDER — FENTANYL CITRATE 50 UG/ML
INJECTION, SOLUTION INTRAMUSCULAR; INTRAVENOUS AS NEEDED
Status: DISCONTINUED | OUTPATIENT
Start: 2024-03-07 | End: 2024-03-07 | Stop reason: SURG

## 2024-03-07 RX ORDER — SCOLOPAMINE TRANSDERMAL SYSTEM 1 MG/1
1 PATCH, EXTENDED RELEASE TRANSDERMAL ONCE
Status: DISCONTINUED | OUTPATIENT
Start: 2024-03-07 | End: 2024-03-07

## 2024-03-07 RX ORDER — CHLORHEXIDINE GLUCONATE ORAL RINSE 1.2 MG/ML
30 SOLUTION DENTAL
Status: COMPLETED | OUTPATIENT
Start: 2024-03-07 | End: 2024-03-07

## 2024-03-07 RX ORDER — CEFAZOLIN SODIUM IN 0.9 % NACL 3 G/100 ML
3000 INTRAVENOUS SOLUTION, PIGGYBACK (ML) INTRAVENOUS ONCE
Status: COMPLETED | OUTPATIENT
Start: 2024-03-07 | End: 2024-03-07

## 2024-03-07 RX ORDER — DROPERIDOL 2.5 MG/ML
0.62 INJECTION, SOLUTION INTRAMUSCULAR; INTRAVENOUS
Status: DISCONTINUED | OUTPATIENT
Start: 2024-03-07 | End: 2024-03-07 | Stop reason: HOSPADM

## 2024-03-07 RX ORDER — HYDROCODONE BITARTRATE AND ACETAMINOPHEN 5; 325 MG/1; MG/1
1 TABLET ORAL ONCE AS NEEDED
Status: DISCONTINUED | OUTPATIENT
Start: 2024-03-07 | End: 2024-03-07 | Stop reason: HOSPADM

## 2024-03-07 RX ORDER — MAGNESIUM HYDROXIDE 1200 MG/15ML
LIQUID ORAL AS NEEDED
Status: DISCONTINUED | OUTPATIENT
Start: 2024-03-07 | End: 2024-03-07 | Stop reason: HOSPADM

## 2024-03-07 RX ORDER — CYANOCOBALAMIN 1000 UG/ML
1000 INJECTION, SOLUTION INTRAMUSCULAR; SUBCUTANEOUS ONCE
Status: COMPLETED | OUTPATIENT
Start: 2024-03-08 | End: 2024-03-08

## 2024-03-07 RX ORDER — ACETAMINOPHEN 500 MG
1000 TABLET ORAL ONCE
Status: COMPLETED | OUTPATIENT
Start: 2024-03-07 | End: 2024-03-07

## 2024-03-07 RX ORDER — ROCURONIUM BROMIDE 10 MG/ML
INJECTION, SOLUTION INTRAVENOUS AS NEEDED
Status: DISCONTINUED | OUTPATIENT
Start: 2024-03-07 | End: 2024-03-07 | Stop reason: SURG

## 2024-03-07 RX ORDER — ENOXAPARIN SODIUM 100 MG/ML
40 INJECTION SUBCUTANEOUS ONCE
Status: DISCONTINUED | OUTPATIENT
Start: 2024-03-07 | End: 2024-03-07 | Stop reason: HOSPADM

## 2024-03-07 RX ORDER — NALOXONE HCL 0.4 MG/ML
0.1 VIAL (ML) INJECTION
Status: DISCONTINUED | OUTPATIENT
Start: 2024-03-07 | End: 2024-03-08 | Stop reason: HOSPADM

## 2024-03-07 RX ORDER — MEPERIDINE HYDROCHLORIDE 25 MG/ML
12.5 INJECTION INTRAMUSCULAR; INTRAVENOUS; SUBCUTANEOUS
Status: DISCONTINUED | OUTPATIENT
Start: 2024-03-07 | End: 2024-03-07 | Stop reason: HOSPADM

## 2024-03-07 RX ORDER — SODIUM CHLORIDE 0.9 % (FLUSH) 0.9 %
3 SYRINGE (ML) INJECTION EVERY 12 HOURS SCHEDULED
Status: DISCONTINUED | OUTPATIENT
Start: 2024-03-07 | End: 2024-03-07 | Stop reason: HOSPADM

## 2024-03-07 RX ORDER — THIAMINE HYDROCHLORIDE 100 MG/ML
100 INJECTION, SOLUTION INTRAMUSCULAR; INTRAVENOUS ONCE
Status: COMPLETED | OUTPATIENT
Start: 2024-03-07 | End: 2024-03-07

## 2024-03-07 RX ORDER — PROMETHAZINE HYDROCHLORIDE 12.5 MG/1
12.5 TABLET ORAL EVERY 6 HOURS PRN
Status: DISCONTINUED | OUTPATIENT
Start: 2024-03-07 | End: 2024-03-08 | Stop reason: HOSPADM

## 2024-03-07 RX ORDER — GABAPENTIN 250 MG/5ML
100 SOLUTION ORAL 3 TIMES DAILY
Status: DISCONTINUED | OUTPATIENT
Start: 2024-03-07 | End: 2024-03-08 | Stop reason: HOSPADM

## 2024-03-07 RX ORDER — ACETAMINOPHEN 500 MG
1000 TABLET ORAL EVERY 8 HOURS SCHEDULED
Status: DISCONTINUED | OUTPATIENT
Start: 2024-03-07 | End: 2024-03-08 | Stop reason: HOSPADM

## 2024-03-07 RX ORDER — GABAPENTIN 300 MG/1
600 CAPSULE ORAL ONCE
Status: COMPLETED | OUTPATIENT
Start: 2024-03-07 | End: 2024-03-07

## 2024-03-07 RX ORDER — QUETIAPINE FUMARATE 100 MG/1
100 TABLET, FILM COATED ORAL NIGHTLY
Status: DISCONTINUED | OUTPATIENT
Start: 2024-03-07 | End: 2024-03-08 | Stop reason: HOSPADM

## 2024-03-07 RX ORDER — SODIUM CHLORIDE 0.9 % (FLUSH) 0.9 %
3-10 SYRINGE (ML) INJECTION AS NEEDED
Status: DISCONTINUED | OUTPATIENT
Start: 2024-03-07 | End: 2024-03-07 | Stop reason: HOSPADM

## 2024-03-07 RX ORDER — SIMETHICONE 80 MG
TABLET,CHEWABLE ORAL
Status: COMPLETED
Start: 2024-03-07 | End: 2024-03-07

## 2024-03-07 RX ORDER — ENOXAPARIN SODIUM 100 MG/ML
40 INJECTION SUBCUTANEOUS EVERY 12 HOURS
Status: DISCONTINUED | OUTPATIENT
Start: 2024-03-08 | End: 2024-03-08 | Stop reason: HOSPADM

## 2024-03-07 RX ORDER — PROMETHAZINE HYDROCHLORIDE 25 MG/1
25 SUPPOSITORY RECTAL ONCE AS NEEDED
Status: DISCONTINUED | OUTPATIENT
Start: 2024-03-07 | End: 2024-03-07 | Stop reason: HOSPADM

## 2024-03-07 RX ORDER — NALOXONE HCL 0.4 MG/ML
0.4 VIAL (ML) INJECTION
Status: DISCONTINUED | OUTPATIENT
Start: 2024-03-07 | End: 2024-03-08 | Stop reason: HOSPADM

## 2024-03-07 RX ORDER — SODIUM CHLORIDE AND POTASSIUM CHLORIDE 150; 450 MG/100ML; MG/100ML
125 INJECTION, SOLUTION INTRAVENOUS CONTINUOUS
Status: DISCONTINUED | OUTPATIENT
Start: 2024-03-08 | End: 2024-03-08 | Stop reason: HOSPADM

## 2024-03-07 RX ORDER — LABETALOL HYDROCHLORIDE 5 MG/ML
5 INJECTION, SOLUTION INTRAVENOUS
Status: DISCONTINUED | OUTPATIENT
Start: 2024-03-07 | End: 2024-03-07 | Stop reason: HOSPADM

## 2024-03-07 RX ORDER — PROCHLORPERAZINE MALEATE 10 MG
10 TABLET ORAL EVERY 6 HOURS PRN
Status: DISCONTINUED | OUTPATIENT
Start: 2024-03-07 | End: 2024-03-08 | Stop reason: HOSPADM

## 2024-03-07 RX ORDER — DEXAMETHASONE SODIUM PHOSPHATE 4 MG/ML
INJECTION, SOLUTION INTRA-ARTICULAR; INTRALESIONAL; INTRAMUSCULAR; INTRAVENOUS; SOFT TISSUE AS NEEDED
Status: DISCONTINUED | OUTPATIENT
Start: 2024-03-07 | End: 2024-03-07 | Stop reason: SURG

## 2024-03-07 RX ORDER — ONDANSETRON 2 MG/ML
4 INJECTION INTRAMUSCULAR; INTRAVENOUS EVERY 4 HOURS PRN
Status: DISCONTINUED | OUTPATIENT
Start: 2024-03-07 | End: 2024-03-08 | Stop reason: HOSPADM

## 2024-03-07 RX ORDER — ACETAMINOPHEN 160 MG/5ML
1000 SOLUTION ORAL EVERY 8 HOURS SCHEDULED
Status: DISCONTINUED | OUTPATIENT
Start: 2024-03-07 | End: 2024-03-08 | Stop reason: HOSPADM

## 2024-03-07 RX ORDER — PANTOPRAZOLE SODIUM 40 MG/10ML
40 INJECTION, POWDER, LYOPHILIZED, FOR SOLUTION INTRAVENOUS
Status: DISCONTINUED | OUTPATIENT
Start: 2024-03-08 | End: 2024-03-08 | Stop reason: HOSPADM

## 2024-03-07 RX ADMIN — GABAPENTIN 100 MG: 100 CAPSULE ORAL at 20:33

## 2024-03-07 RX ADMIN — DEXMEDETOMIDINE HYDROCHLORIDE 10 MCG: 100 INJECTION, SOLUTION INTRAVENOUS at 12:11

## 2024-03-07 RX ADMIN — CHLORHEXIDINE GLUCONATE 30 ML: 1.2 SOLUTION ORAL at 09:27

## 2024-03-07 RX ADMIN — ONDANSETRON 4 MG: 2 INJECTION INTRAMUSCULAR; INTRAVENOUS at 12:41

## 2024-03-07 RX ADMIN — THIAMINE HYDROCHLORIDE 100 MG: 100 INJECTION, SOLUTION INTRAMUSCULAR; INTRAVENOUS at 15:53

## 2024-03-07 RX ADMIN — SODIUM CHLORIDE, POTASSIUM CHLORIDE, SODIUM LACTATE AND CALCIUM CHLORIDE 1000 ML: 600; 310; 30; 20 INJECTION, SOLUTION INTRAVENOUS at 09:38

## 2024-03-07 RX ADMIN — ALBUTEROL SULFATE 4 PUFF: 90 AEROSOL, METERED RESPIRATORY (INHALATION) at 13:00

## 2024-03-07 RX ADMIN — SCOPOLAMINE 1 PATCH: 1.5 PATCH, EXTENDED RELEASE TRANSDERMAL at 09:38

## 2024-03-07 RX ADMIN — PANTOPRAZOLE SODIUM 40 MG: 40 INJECTION, POWDER, FOR SOLUTION INTRAVENOUS at 09:37

## 2024-03-07 RX ADMIN — OXYCODONE HYDROCHLORIDE 5 MG: 5 TABLET ORAL at 20:32

## 2024-03-07 RX ADMIN — DEXAMETHASONE SODIUM PHOSPHATE 4 MG: 4 INJECTION INTRA-ARTICULAR; INTRALESIONAL; INTRAMUSCULAR; INTRAVENOUS; SOFT TISSUE at 11:36

## 2024-03-07 RX ADMIN — ROCURONIUM BROMIDE 100 MG: 10 SOLUTION INTRAVENOUS at 11:36

## 2024-03-07 RX ADMIN — LIDOCAINE HYDROCHLORIDE 100 MG: 10 INJECTION, SOLUTION EPIDURAL; INFILTRATION; INTRACAUDAL; PERINEURAL at 11:36

## 2024-03-07 RX ADMIN — CEFAZOLIN 3000 MG: 10 INJECTION, POWDER, FOR SOLUTION INTRAVENOUS at 11:41

## 2024-03-07 RX ADMIN — HYDROMORPHONE HYDROCHLORIDE 0.5 MG: 1 INJECTION, SOLUTION INTRAMUSCULAR; INTRAVENOUS; SUBCUTANEOUS at 14:09

## 2024-03-07 RX ADMIN — SODIUM CHLORIDE, POTASSIUM CHLORIDE, SODIUM LACTATE AND CALCIUM CHLORIDE: 600; 310; 30; 20 INJECTION, SOLUTION INTRAVENOUS at 11:20

## 2024-03-07 RX ADMIN — BUPIVACAINE HYDROCHLORIDE 60 ML: 2.5 INJECTION, SOLUTION EPIDURAL; INFILTRATION; INTRACAUDAL; PERINEURAL at 11:45

## 2024-03-07 RX ADMIN — DEXMEDETOMIDINE HYDROCHLORIDE 10 MCG: 100 INJECTION, SOLUTION INTRAVENOUS at 11:36

## 2024-03-07 RX ADMIN — PROPOFOL 25 MCG/KG/MIN: 10 INJECTION, EMULSION INTRAVENOUS at 11:42

## 2024-03-07 RX ADMIN — Medication 1 MG: at 12:13

## 2024-03-07 RX ADMIN — AMISULPRIDE 10 MG: 2.5 INJECTION, SOLUTION INTRAVENOUS at 12:38

## 2024-03-07 RX ADMIN — Medication 80 MG: at 13:55

## 2024-03-07 RX ADMIN — ACETAMINOPHEN 1000 MG: 500 TABLET ORAL at 20:33

## 2024-03-07 RX ADMIN — SODIUM CHLORIDE, POTASSIUM CHLORIDE, SODIUM LACTATE AND CALCIUM CHLORIDE 150 ML/HR: 600; 310; 30; 20 INJECTION, SOLUTION INTRAVENOUS at 09:57

## 2024-03-07 RX ADMIN — ACETAMINOPHEN 1000 MG: 500 TABLET ORAL at 15:53

## 2024-03-07 RX ADMIN — HYDROMORPHONE HYDROCHLORIDE 0.5 MG: 1 INJECTION, SOLUTION INTRAMUSCULAR; INTRAVENOUS; SUBCUTANEOUS at 13:51

## 2024-03-07 RX ADMIN — GABAPENTIN 600 MG: 300 CAPSULE ORAL at 09:38

## 2024-03-07 RX ADMIN — PROPOFOL 400 MG: 10 INJECTION, EMULSION INTRAVENOUS at 11:36

## 2024-03-07 RX ADMIN — GABAPENTIN 100 MG: 100 CAPSULE ORAL at 15:54

## 2024-03-07 RX ADMIN — FENTANYL CITRATE 100 MCG: 50 INJECTION, SOLUTION INTRAMUSCULAR; INTRAVENOUS at 11:36

## 2024-03-07 RX ADMIN — SODIUM CHLORIDE, POTASSIUM CHLORIDE, SODIUM LACTATE AND CALCIUM CHLORIDE 150 ML/HR: 600; 310; 30; 20 INJECTION, SOLUTION INTRAVENOUS at 15:53

## 2024-03-07 RX ADMIN — CHLORHEXIDINE GLUCONATE 30 ML: 1.2 SOLUTION ORAL at 09:37

## 2024-03-07 RX ADMIN — SODIUM CHLORIDE, POTASSIUM CHLORIDE, SODIUM LACTATE AND CALCIUM CHLORIDE: 600; 310; 30; 20 INJECTION, SOLUTION INTRAVENOUS at 12:55

## 2024-03-07 RX ADMIN — CEFAZOLIN 3000 MG: 10 INJECTION, POWDER, FOR SOLUTION INTRAVENOUS at 20:33

## 2024-03-07 RX ADMIN — SUGAMMADEX 400 MG: 100 INJECTION, SOLUTION INTRAVENOUS at 12:49

## 2024-03-07 RX ADMIN — FENTANYL CITRATE 50 MCG: 50 INJECTION, SOLUTION INTRAMUSCULAR; INTRAVENOUS at 13:39

## 2024-03-07 RX ADMIN — ACETAMINOPHEN 1000 MG: 500 TABLET ORAL at 09:38

## 2024-03-07 RX ADMIN — LIDOCAINE HYDROCHLORIDE 0.5 ML: 10 INJECTION, SOLUTION EPIDURAL; INFILTRATION; INTRACAUDAL; PERINEURAL at 09:30

## 2024-03-07 RX ADMIN — SIMETHICONE 80 MG: 80 TABLET, CHEWABLE ORAL at 13:55

## 2024-03-07 RX ADMIN — ROCURONIUM BROMIDE 10 MG: 10 SOLUTION INTRAVENOUS at 12:13

## 2024-03-07 RX ADMIN — DEXAMETHASONE SODIUM PHOSPHATE 4 MG: 10 INJECTION, SOLUTION INTRAMUSCULAR; INTRAVENOUS at 11:45

## 2024-03-07 NOTE — ANESTHESIA PROCEDURE NOTES
Airway  Urgency: elective    Date/Time: 3/7/2024 11:38 AM  Airway not difficult    General Information and Staff    Patient location during procedure: OR  CRNA/CAA: Mirella Hoang CRNA    Indications and Patient Condition  Indications for airway management: airway protection    Preoxygenated: yes  Mask difficulty assessment: 2 - vent by mask + OA or adjuvant +/- NMBA    Final Airway Details  Final airway type: endotracheal airway      Successful airway: ETT  Cuffed: yes   Successful intubation technique: video laryngoscopy  Facilitating devices/methods: intubating stylet  Endotracheal tube insertion site: oral  Blade: Adair  Blade size: 4  ETT size (mm): 7.5  Cormack-Lehane Classification: grade I - full view of glottis  Placement verified by: chest auscultation and capnometry   Measured from: teeth  ETT/EBT  to teeth (cm): 22  Number of attempts at approach: 1  Assessment: lips, teeth, and gum same as pre-op and atraumatic intubation    Additional Comments  Negative epigastric sound, Breath sounds equal bilaterally with symmetric chest rise and fall.

## 2024-03-07 NOTE — ANESTHESIA PROCEDURE NOTES
"Peripheral Block      Patient reassessed immediately prior to procedure    Patient location during procedure: OR  Start time: 3/7/2024 11:40 AM  Stop time: 3/7/2024 11:45 AM  Reason for block: at surgeon's request and post-op pain management  Performed by  Anesthesiologist: Velasquez Paulino MD  Preanesthetic Checklist  Completed: patient identified, IV checked, site marked, risks and benefits discussed, surgical consent, monitors and equipment checked, pre-op evaluation and timeout performed  Prep:  Pt Position: supine  Sterile barriers:cap, gloves, mask and washed/disinfected hands  Prep: ChloraPrep  Patient monitoring: blood pressure monitoring, continuous pulse oximetry and EKG  Procedure    Sedation: yes  Performed under: general  Guidance:ultrasound guided  Images:still images obtained, printed/placed on chart    Laterality:Bilateral  Block Type:TAP  Injection Technique:single-shot  Needle Type:short-bevel and echogenic  Needle Gauge:20 G  Resistance on Injection: none    Medications Used: dexamethasone sodium phosphate injection - Injection   4 mg - 3/7/2024 11:45:00 AM  bupivacaine PF (MARCAINE) 0.25 % injection - Injection   60 mL - 3/7/2024 11:45:00 AM      Medications  Comment:Block Injection:  LA dose divided between Right and Left block        Post Assessment  Injection Assessment: negative aspiration for heme, incremental injection and no paresthesia on injection  Patient Tolerance:comfortable throughout block  Complications:no  Additional Notes    Subcostal TAPs    A high-frequency linear transducer, with sterile cover, was placed sub-xiphoid to identify Linea Alba, right and left Rectus Abdominus Muscles (VANESSA). The transducer was moved either right or left subcostally to identify the VANESSA and the Transverse Abdominus Muscle (MAURER). The insertion site was prepped in sterile fashion and then localized with 2-5 ml of 1% Lidocaine. Using ultrasound-guidance, a 20-gauge B-Luna 4\" Ultraplex 360 " non-stimulating echogenic needle was advanced in plane, from medial to lateral, until the tip of the needle was in the fascial plane between the VANESSA and MAURER. 1-3ml of preservative free normal saline was used to hydro-dissect the fascial planes. After the fascial plane was verified, the local anesthetic (LA) was injected. The procedure was repeated on the opposite side for bilateral coverage. Aspiration every 5 ml to prevent intravascular injection. Injection was completed with negative aspiration of blood and negative intravascular injection. Injection pressures were normal with minimal resistance. The subcostal approach to the TAP nerve block ideally anesthetizes the intercostal nerves T6-T9.

## 2024-03-07 NOTE — BRIEF OP NOTE
GASTRIC SLEEVE LAPAROSCOPIC, ESOPHAGOGASTRODUODENOSCOPY  Progress Note    Estuardo PERDOMO Valeriy  3/7/2024    Pre-op Diagnosis:   Morbid obesity with body mass index of 40.0-44.9 in adult [E66.01, Z68.41]       Post-Op Diagnosis Codes:     * Morbid obesity with body mass index of 40.0-44.9 in adult [E66.01, Z68.41]    Procedure/CPT® Codes:  N[place] LAPS GSTRC RSTRICTIV PX LONGITUDINAL GASTRECTOMY [69517]  [place] ESOPHAGOGASTRODUODENOSCOPY TRANSORAL DIAGNOSTIC [44189]      Procedure(s):  GASTRIC SLEEVE LAPAROSCOPIC  ESOPHAGOGASTRODUODENOSCOPY              Surgeon(s):  Ghassan Koch MD    Anesthesia: General with Block    Staff:   Circulator: Sandra Rivers RN  Scrub Person: Dayana Gao  Nursing Assistant: Butch, Nataliia, CNA; Vittitow, Marilia         Estimated Blood Loss: minimal    Urine Voided: * No values recorded between 3/7/2024 11:31 AM and 3/7/2024 12:49 PM *    Specimens:                Specimens       ID Source Type Tests Collected By Collected At Frozen?    A Stomach Tissue TISSUE PATHOLOGY EXAM   Ghassan Koch MD 3/7/24 1217 No    Description: SUB-TOTAL GASTRECTOMY                  Drains: * No LDAs found *    Findings:         Complications: None          Ghassan Koch MD     Date: 3/7/2024  Time: 12:51 EST

## 2024-03-07 NOTE — OP NOTE
Preoperative Diagnosis:   Morbid Obesity (302 pounds, 137 kg, BMI 40.96) with Multiple Co-Morbidities    Postoperative Diagnosis:   Same    Procedure:                                                    Laparoscopic Sleeve Gastrectomy (85% subtotal vertical gastrectomy) Titan [id number]                                                                        Esophagogastroduodenoscopy                                                                      Surgeon:                                                       BERE Koch MD    Anesthesia:                                                   GETA    EBL:                                                              Minimal    Fluids:                                                           Crystalloid    Specimens:                                                   Subtotal gastrectomy    Drains:                                                           None    Counts:                                                          Correct    Complications:                                               None    Indications:   This is a 28 year-old morbidly obese male who presents for elective laparoscopic sleeve gastrectomy.  He is aware of the special circumstances of paying out of pocket for this procedure and BLIS selective complication protection and still wishes to proceed.  He confirmed he has been nicotine free for at least the last 2 weeks and will remain so for the next 6 weeks to minimize the risk of delayed leak.  He has undergone our extensive preoperative education teaching and consent process everything is in order.    Operative Technique:     The patient was brought to the operating room, and placed supine upon the operating room table.  SCD hose were placed, he underwent uneventful general endotracheal anesthesia per the anesthesiology staff, he received IV Ancef, and subcutaneous Lovenox, the anesthesiology staff performed a TAP block, and his abdomen was  prepped and draped with ChloraPrep in a sterile fashion, an Ioban was used as well, a Hinson catheter was not placed.    The peritoneal cavity was entered in the left upper quadrant using a 5 mm trocar utilizing an OptiView technique and the abdomen was insufflated to a pressure of 15 mmHg with CO2 gas. Exploratory laparoscopy revealed no evidence of injury from the entrance technique, mildly enlarged perhaps slightly fatty appearing liver, normal-appearing gallbladder, small dimple weakness at the umbilicus.    Remaining trocars were placed under direct visualization including 5 mm trocars in the right, mid, and left lateral abdomen and after infiltration of the peritoneum with local anesthetic under direct visualization a 19 mm trocar was placed above and to the right of the umbilicus away from the umbilicus itself and the midline through the right medial rectus sheath.    Through a stab incision in the epigastrium a Anthony retractor was used to elevate the left lobe of the liver.   Beginning approximately two thirds of the way around the greater curvature of the stomach, the gastrocolic vessels were divided using the Enseal device.  This proceeded proximally taking down all the short gastric vessels and exposing the left ludwin.  No visible anterior or posterior hiatal hernias or lipomas however the hiatus did appear weak, and photodocumentation of the hiatus obtained.  1 mg glucagon IV given.  Gastrocolic vessels were then divided medially to a few centimeters proximal to the pylorus.  Adhesions of the posterior stomach to the pancreas and retroperitoneum were divided.  The stomach was marked with a Kitner saturated with a marking pen 1 cm lateral to the angle of His, 3 cm from the angularis, and 6 cm from the pylorus. A 38 Mongolian balloon bougie was advanced into the distal antrum and the balloon insufflated with 60 cc of air.   The Titan stapler was positioned along the markings with the calibration balloon  centered at the marking at the angularis and the stapler was closed.  The calibration bougie was desufflated and removed.  The 85% subtotal vertical sleeve gastrectomy was then performed with a single firing using the Titan stapler [id number].  The Titan stapler was removed.  The subtotal gastrectomy specimen was retrieved through the 19 mm trocar site incision, inspected, and sent unopened to pathology for permanent section.  It was a slightly smaller than average size specimen.  The sleeve was submerged under saline.  Upper endoscopy was performed, and the endoscope was advanced into the duodenal bulb.  No air bubbles or leak seen, no bleeding at the staple line, no narrowing at the angularis, no pyloric spasm or deformity, no gastritis, no hiatal hernia or Arshad's esophagus, Z-line roughly 39 cm, and the endoscope was withdrawn.   Irrigation fluid was suctioned free.  The sleeve was resting nicely and hemostatic.  The sleeve staple line was treated with 10 cc of aerosolized Tisseel fibrin glue.  Photodocumentation of the sleeve obtained before and after endoscopy.  The Anthony retractor was removed.  10 mg Barhemsys IV given.  Fascia at the 19 mm trocar site incision was closed with a horizontal mattress 0 Vicryl suture placed with a suture passer under direct visualization and tying the knot extracorporeally.  Remaining trocars were removed under direct visualization, no bleeding noted from their sites.  Skin in each incision was closed using 3-0 Monocryl plus in an interrupted subcuticular stitch followed by skin glue.  The patient tolerated the procedure well without complication, was taken to the recovery room in stable condition.

## 2024-03-07 NOTE — ANESTHESIA POSTPROCEDURE EVALUATION
Patient: Estuardo Crooks    Procedure Summary       Date: 03/07/24 Room / [facility]    Anesthesia Start: 1131 Anesthesia Stop: 1313    Procedures:       GASTRIC SLEEVE LAPAROSCOPIC (Abdomen)      ESOPHAGOGASTRODUODENOSCOPY (Esophagus) Diagnosis:       Morbid obesity with body mass index of 40.0-44.9 in adult      (Morbid obesity with body mass index of 40.0-44.9 in adult [E66.01, Z68.41])    Surgeons: Ghassan Koch MD Provider: Rebel Mitchell MD    Anesthesia Type: general with block ASA Status: 3            Anesthesia Type: general with block    Vitals  Vitals Value Taken Time   /81 03/07/24 1308   Temp     Pulse 67 03/07/24 1310   Resp     SpO2 92 % 03/07/24 1310   Vitals shown include unfiled device data.        Post Anesthesia Care and Evaluation    Patient location during evaluation: PACU  Patient participation: waiting for patient participation  Level of consciousness: sleepy but conscious  Pain management: adequate    Airway patency: patent  Anesthetic complications: No anesthetic complications  PONV Status: none  Cardiovascular status: hemodynamically stable and acceptable  Respiratory status: nonlabored ventilation, acceptable, nasal cannula and oral airway  Hydration status: acceptable

## 2024-03-07 NOTE — ANESTHESIA PREPROCEDURE EVALUATION
Anesthesia Evaluation     Patient summary reviewed and Nursing notes reviewed   no history of anesthetic complications:   NPO Solid Status: > 8 hours  NPO Liquid Status: > 2 hours           Airway   Mallampati: II  TM distance: >3 FB  Neck ROM: full  No difficulty expected  Dental - normal exam     Pulmonary - normal exam    breath sounds clear to auscultation  (+) a smoker (quit vaping ~ 2 wks ago) vape,  Cardiovascular - normal exam    ECG reviewed  Rhythm: regular  Rate: normal      ROS comment: 1/24 Echo:  ·  Left ventricular systolic function is normal. Calculated left ventricular EF = 57%  ·  Left ventricular diastolic function was normal.  ·  mild tricuspid regurgitation  ·  Estimated right ventricular systolic pressure from tricuspid regurgitation is normal (<35 mmHg). Calculated right ventricular systolic pressure from tricuspid regurgitation is 28 mmHg.  ·  Echo normal except for mild tricuspid regurgitation      Neuro/Psych- negative ROS  GI/Hepatic/Renal/Endo    (+) morbid obesity, GERD well controlled    Musculoskeletal (-) negative ROS    Abdominal    Substance History      OB/GYN          Other - negative ROS                   Anesthesia Plan    ASA 3     general with block     (Bilateral TAP blocks post-induction for post-operative analgesia per request of Dr. Koch  )  intravenous induction     Anesthetic plan, risks, benefits, and alternatives have been provided, discussed and informed consent has been obtained with: patient.    Plan discussed with CRNA.    CODE STATUS:

## 2024-03-07 NOTE — INTERVAL H&P NOTE
H&P updated. The patient was examined and the following changes are noted:  He confirms no nicotine last 2 weeks, says he underestimated how hard it was to quit and the first few days were tough.  MRSA screen negative. Normal Hb A1c

## 2024-03-08 ENCOUNTER — APPOINTMENT (OUTPATIENT)
Dept: GENERAL RADIOLOGY | Facility: HOSPITAL | Age: 29
End: 2024-03-08

## 2024-03-08 ENCOUNTER — READMISSION MANAGEMENT (OUTPATIENT)
Dept: CALL CENTER | Facility: HOSPITAL | Age: 29
End: 2024-03-08
Payer: COMMERCIAL

## 2024-03-08 VITALS
HEIGHT: 72 IN | OXYGEN SATURATION: 88 % | WEIGHT: 302 LBS | HEART RATE: 90 BPM | RESPIRATION RATE: 16 BRPM | BODY MASS INDEX: 40.9 KG/M2 | DIASTOLIC BLOOD PRESSURE: 81 MMHG | TEMPERATURE: 98.5 F | SYSTOLIC BLOOD PRESSURE: 115 MMHG

## 2024-03-08 LAB
ALBUMIN SERPL-MCNC: 3.9 G/DL (ref 3.5–5.2)
ALBUMIN/GLOB SERPL: 1.3 G/DL
ALP SERPL-CCNC: 57 U/L (ref 39–117)
ALT SERPL W P-5'-P-CCNC: 45 U/L (ref 1–41)
ANION GAP SERPL CALCULATED.3IONS-SCNC: 10 MMOL/L (ref 5–15)
AST SERPL-CCNC: 26 U/L (ref 1–40)
BASOPHILS # BLD AUTO: 0.02 10*3/MM3 (ref 0–0.2)
BASOPHILS NFR BLD AUTO: 0.2 % (ref 0–1.5)
BILIRUB SERPL-MCNC: 0.4 MG/DL (ref 0–1.2)
BUN SERPL-MCNC: 6 MG/DL (ref 6–20)
BUN/CREAT SERPL: 7.8 (ref 7–25)
CALCIUM SPEC-SCNC: 9.2 MG/DL (ref 8.6–10.5)
CHLORIDE SERPL-SCNC: 103 MMOL/L (ref 98–107)
CO2 SERPL-SCNC: 25 MMOL/L (ref 22–29)
CREAT SERPL-MCNC: 0.77 MG/DL (ref 0.76–1.27)
CYTO UR: NORMAL
DEPRECATED RDW RBC AUTO: 40.2 FL (ref 37–54)
EGFRCR SERPLBLD CKD-EPI 2021: 123.5 ML/MIN/1.73
EOSINOPHIL # BLD AUTO: 0.06 10*3/MM3 (ref 0–0.4)
EOSINOPHIL NFR BLD AUTO: 0.6 % (ref 0.3–6.2)
ERYTHROCYTE [DISTWIDTH] IN BLOOD BY AUTOMATED COUNT: 12.1 % (ref 12.3–15.4)
GLOBULIN UR ELPH-MCNC: 2.9 GM/DL
GLUCOSE SERPL-MCNC: 94 MG/DL (ref 65–99)
HCT VFR BLD AUTO: 41.6 % (ref 37.5–51)
HGB BLD-MCNC: 13.9 G/DL (ref 13–17.7)
IMM GRANULOCYTES # BLD AUTO: 0.03 10*3/MM3 (ref 0–0.05)
IMM GRANULOCYTES NFR BLD AUTO: 0.3 % (ref 0–0.5)
IRON 24H UR-MRATE: 54 MCG/DL (ref 59–158)
LAB AP CASE REPORT: NORMAL
LAB AP CLINICAL INFORMATION: NORMAL
LYMPHOCYTES # BLD AUTO: 2.02 10*3/MM3 (ref 0.7–3.1)
LYMPHOCYTES NFR BLD AUTO: 21.2 % (ref 19.6–45.3)
MCH RBC QN AUTO: 30.4 PG (ref 26.6–33)
MCHC RBC AUTO-ENTMCNC: 33.4 G/DL (ref 31.5–35.7)
MCV RBC AUTO: 91 FL (ref 79–97)
MONOCYTES # BLD AUTO: 0.95 10*3/MM3 (ref 0.1–0.9)
MONOCYTES NFR BLD AUTO: 10 % (ref 5–12)
NEUTROPHILS NFR BLD AUTO: 6.43 10*3/MM3 (ref 1.7–7)
NEUTROPHILS NFR BLD AUTO: 67.7 % (ref 42.7–76)
NRBC BLD AUTO-RTO: 0 /100 WBC (ref 0–0.2)
PATH REPORT.FINAL DX SPEC: NORMAL
PATH REPORT.GROSS SPEC: NORMAL
PLATELET # BLD AUTO: 246 10*3/MM3 (ref 140–450)
PMV BLD AUTO: 9.2 FL (ref 6–12)
POTASSIUM SERPL-SCNC: 3.5 MMOL/L (ref 3.5–5.2)
PROT SERPL-MCNC: 6.8 G/DL (ref 6–8.5)
RBC # BLD AUTO: 4.57 10*6/MM3 (ref 4.14–5.8)
SODIUM SERPL-SCNC: 138 MMOL/L (ref 136–145)
WBC NRBC COR # BLD AUTO: 9.51 10*3/MM3 (ref 3.4–10.8)

## 2024-03-08 PROCEDURE — 25010000002 POTASSIUM CHLORIDE PER 2 MEQ: Performed by: SURGERY

## 2024-03-08 PROCEDURE — 74240 X-RAY XM UPR GI TRC 1CNTRST: CPT

## 2024-03-08 PROCEDURE — 80053 COMPREHEN METABOLIC PANEL: CPT | Performed by: SURGERY

## 2024-03-08 PROCEDURE — 25010000002 THIAMINE PER 100 MG: Performed by: SURGERY

## 2024-03-08 PROCEDURE — 83540 ASSAY OF IRON: CPT | Performed by: SURGERY

## 2024-03-08 PROCEDURE — 99024 POSTOP FOLLOW-UP VISIT: CPT | Performed by: SURGERY

## 2024-03-08 PROCEDURE — 0 DIATRIZOATE MEGLUMINE & SODIUM PER 1 ML: Performed by: SURGERY

## 2024-03-08 PROCEDURE — 25010000002 ENOXAPARIN PER 10 MG: Performed by: SURGERY

## 2024-03-08 PROCEDURE — 25810000003 SODIUM CHLORIDE 0.9 % SOLUTION 1,000 ML FLEX CONT: Performed by: SURGERY

## 2024-03-08 PROCEDURE — 25010000002 CEFAZOLIN PER 500 MG: Performed by: SURGERY

## 2024-03-08 PROCEDURE — 25010000002 CYANOCOBALAMIN PER 1000 MCG: Performed by: SURGERY

## 2024-03-08 PROCEDURE — 85025 COMPLETE CBC W/AUTO DIFF WBC: CPT | Performed by: SURGERY

## 2024-03-08 RX ORDER — OMEPRAZOLE 40 MG/1
40 CAPSULE, DELAYED RELEASE ORAL DAILY
Qty: 60 CAPSULE | Refills: 0 | Status: SHIPPED | OUTPATIENT
Start: 2024-03-08 | End: 2024-05-07

## 2024-03-08 RX ORDER — OXYCODONE HYDROCHLORIDE 5 MG/1
5 TABLET ORAL EVERY 4 HOURS PRN
Qty: 10 TABLET | Refills: 0 | Status: SHIPPED | OUTPATIENT
Start: 2024-03-08

## 2024-03-08 RX ORDER — ONDANSETRON 4 MG/1
4 TABLET, ORALLY DISINTEGRATING ORAL EVERY 4 HOURS PRN
Qty: 8 TABLET | Refills: 0 | Status: SHIPPED | OUTPATIENT
Start: 2024-03-08

## 2024-03-08 RX ADMIN — FOLIC ACID 200 ML/HR: 5 INJECTION, SOLUTION INTRAMUSCULAR; INTRAVENOUS; SUBCUTANEOUS at 03:52

## 2024-03-08 RX ADMIN — PANTOPRAZOLE SODIUM 40 MG: 40 INJECTION, POWDER, FOR SOLUTION INTRAVENOUS at 06:01

## 2024-03-08 RX ADMIN — ACETAMINOPHEN 1000 MG: 500 TABLET ORAL at 06:01

## 2024-03-08 RX ADMIN — ENOXAPARIN SODIUM 40 MG: 100 INJECTION SUBCUTANEOUS at 10:50

## 2024-03-08 RX ADMIN — POTASSIUM CHLORIDE AND SODIUM CHLORIDE 125 ML/HR: 450; 150 INJECTION, SOLUTION INTRAVENOUS at 12:19

## 2024-03-08 RX ADMIN — CEFAZOLIN 3000 MG: 10 INJECTION, POWDER, FOR SOLUTION INTRAVENOUS at 06:01

## 2024-03-08 RX ADMIN — ACETAMINOPHEN 1000 MG: 500 TABLET ORAL at 12:25

## 2024-03-08 RX ADMIN — GABAPENTIN 100 MG: 100 CAPSULE ORAL at 09:29

## 2024-03-08 RX ADMIN — OXYCODONE HYDROCHLORIDE 5 MG: 5 TABLET ORAL at 15:58

## 2024-03-08 RX ADMIN — CYANOCOBALAMIN 1000 MCG: 1000 INJECTION, SOLUTION INTRAMUSCULAR; SUBCUTANEOUS at 10:50

## 2024-03-08 NOTE — PROGRESS NOTES
"Cc: POD#1 LSG  \"sore\"    His wife and infant child are in the room.  He is sore but feels well would like to go home if possible.  He is wearing oxygen by nasal cannula and his wife feels he likely has sleep apnea.  He is tolerating his diet without nausea or vomiting.  Passing flatus, no bowel movement.  Ambulating and voiding well.  No pulmonary complaints, yesterday when I saw him spirometer over 2500 observed and he says he has been using it today.    No fever or tachycardia pulse 69 respirations 18 blood pressure 118/76 saturation 88%. UO NR.  He is no apparent distress.  Abdomen soft, nontender/appropriate, nondistended, bowel sounds present.  Wounds look okay.    CMP normal except for an ALT of 45.  Iron is 54.  CBC unremarkable of note white count 9.5 hemoglobin 14 normal differential.  Hemoglobin A1c 5.50.  Upper GI images reviewed, no leak or obstruction appreciated, report pending.    Impression: Postop day #1 laparoscopic sleeve gastrectomy.  Doing well clinically, would like to go home and does meet discharge criteria.  Iron deficiency without evidence of anemia or bleeding on Lovenox.  Mild elevation in ALT likely related to liver retraction during surgery.  Likely undiagnosed obstructive sleep apnea.    Plan: Discharge home.  Discharge instructions discussed.  Follow-up in the office in 1 to 2 weeks and call in the meantime with any problems questions or concerns.  Reiterated avoiding ulcerogenic agents for the next 6 to 8 weeks.  Prescriptions for Roxicodone and omeprazole.  Zofran in case.  See orders.       "

## 2024-03-08 NOTE — CASE MANAGEMENT/SOCIAL WORK
Discharge Planning Assessment  [facility]     Patient Name: Estuardo Crooks  MRN: [id number]  Today's Date: 3/8/2024    Admit Date: 3/7/2024    Plan: Pt. has no d/c needs   Discharge Needs Assessment       Row Name 03/08/24 1550       Living Environment    People in Home spouse    Current Living Arrangements home    Potentially Unsafe Housing Conditions unable to assess    Primary Care Provided by self    Provides Primary Care For no one    Family Caregiver if Needed spouse    Quality of Family Relationships unable to assess       Discharge Needs Assessment    Readmission Within the Last 30 Days no previous admission in last 30 days    Equipment Currently Used at Home none    Concerns to be Addressed no discharge needs identified                   Discharge Plan       Row Name 03/08/24 1548       Plan    Plan Pt. has no d/c needs    Plan Comments MSW met with pt. at bedside. No d/c needs at this time. Plan is home.    Final Discharge Disposition Code 01 - home or self-care                  Continued Care and Services - Admitted Since 3/7/2024    No active coordination exists for this encounter.       Expected Discharge Date and Time       Expected Discharge Date Expected Discharge Time    Mar 8, 2024            Demographic Summary       Row Name 03/08/24 1549       General Information    Admission Type inpatient    Arrived From home    Referral Source admission list    Reason for Consult discharge planning    Preferred Language English                   Functional Status       Row Name 03/08/24 1550       Functional Status, IADL    Medications independent    Meal Preparation independent    Housekeeping independent    Laundry independent    Shopping independent       Mental Status Summary    Recent Changes in Mental Status/Cognitive Functioning unable to assess       Employment/    Employment Status employed full-time                   Psychosocial    No documentation.                  Abuse/Neglect     No documentation.                  Legal    No documentation.                  Substance Abuse    No documentation.                  Patient Forms    No documentation.                     BRYN Tran     Scribe Attestation (For Scribes USE Only)... Attending Attestation (For Attendings USE Only).../Scribe Attestation (For Scribes USE Only)...

## 2024-03-08 NOTE — OUTREACH NOTE
Prep Survey      Flowsheet Row Responses   [facility] patient discharged from? [place]   Is LACE score < 7 ? Yes   Eligibility [facility]   Date of Admission 03/07/24   Date of Discharge 03/08/24   Discharge Disposition Home or Self Care   Discharge diagnosis laparoscopic sleeve gastrectomy   Does the patient have one of the following disease processes/diagnoses(primary or secondary)? General Surgery   Does the patient have Home health ordered? No   Is there a DME ordered? No   Prep survey completed? Yes            HERVE HART - Registered Nurse

## 2024-03-08 NOTE — NURSING NOTE
Pt has ambulated in the hallway often this shift, reports pain well controlled with po meds, and denies nausea. Is tolerating po protein well. Has watched bariatric discharge video and reports he has not further questions at this time.

## 2024-03-11 ENCOUNTER — TRANSITIONAL CARE MANAGEMENT TELEPHONE ENCOUNTER (OUTPATIENT)
Dept: CALL CENTER | Facility: HOSPITAL | Age: 29
End: 2024-03-11
Payer: COMMERCIAL

## 2024-03-11 NOTE — OUTREACH NOTE
Call Center TCM Note      Flowsheet Row Responses   [facility] patient discharged from? [place]   Does the patient have one of the following disease processes/diagnoses(primary or secondary)? General Surgery   TCM attempt successful? Yes   Call start time 1210   Call end time 1214   Discharge diagnosis laparoscopic sleeve gastrectomy   Meds reviewed with patient/caregiver? Yes   Is the patient having any side effects they believe may be caused by any medication additions or changes? No   Does the patient have all medications related to this admission filled (includes all antibiotics, pain medications, etc.) Yes   Is the patient taking all medications as directed (includes completed medication regime)? Yes   Comments HOSP DC FU appt 3/15/24 2 pm.   Does the patient have an appointment with their PCP within 7-14 days of discharge? Yes   Has home health visited the patient within 72 hours of discharge? N/A   Psychosocial issues? No   Did the patient receive a copy of their discharge instructions? Yes   Nursing interventions Reviewed instructions with patient   What is the patient's perception of their health status since discharge? Improving   Nursing interventions Nurse provided patient education   Is the patient /caregiver able to teach back basic post-op care? Take showers only when approved by MD-sponge bathe until then, No tub bath, swimming, or hot tub until instructed by MD, Lifting as instructed by MD in discharge instructions   Is the patient/caregiver able to teach back signs and symptoms of incisional infection? Increased redness, swelling or pain at the incisonal site, Increased drainage or bleeding, Incisional warmth, Pus or odor from incision, Fever   Is the patient/caregiver able to teach back steps to recovery at home? Set small, achievable goals for return to baseline health   Is the patient/caregiver able to teach back the hierarchy of who to call/visit for symptoms/problems? PCP, Specialist,  Home health nurse, Urgent Care, ED, 911 Yes   TCM call completed? Yes   Wrap up additional comments Pt reports he is doing better each day. Pt had question on the vitamins that the surgeon office gave him. Advised pt to call surgeon office Pt ARLEEN.   Call end time 1214            Trisha Mcdaniel RN    3/11/2024, 12:14 EDT

## 2024-03-14 ENCOUNTER — OFFICE VISIT (OUTPATIENT)
Dept: BARIATRICS/WEIGHT MGMT | Facility: CLINIC | Age: 29
End: 2024-03-14
Payer: COMMERCIAL

## 2024-03-14 VITALS
HEIGHT: 72 IN | TEMPERATURE: 97.7 F | OXYGEN SATURATION: 96 % | DIASTOLIC BLOOD PRESSURE: 68 MMHG | SYSTOLIC BLOOD PRESSURE: 118 MMHG | BODY MASS INDEX: 38.94 KG/M2 | HEART RATE: 69 BPM | WEIGHT: 287.5 LBS

## 2024-03-14 DIAGNOSIS — E66.9 OBESITY, CLASS II, BMI 35-39.9: Primary | ICD-10-CM

## 2024-03-14 PROCEDURE — 99024 POSTOP FOLLOW-UP VISIT: CPT | Performed by: SURGERY

## 2024-03-14 RX ORDER — URSODIOL 300 MG/1
300 CAPSULE ORAL 2 TIMES DAILY
Qty: 60 CAPSULE | Refills: 5 | Status: SHIPPED | OUTPATIENT
Start: 2024-03-14 | End: 2024-09-10

## 2024-03-14 NOTE — PROGRESS NOTES
[facility] Bariatric Surgery  [address]  [address]  [facility] [place] [address]  [phone]      Patient Name:  Estuardo Crooks.  :  1995      Date of Visit: 3/14/2024      Reason for Visit:  POD #7    HPI:  Estuardo Crooks is a 28 y.o. male s/p 3/7/24 LSG Dr. Koch    Doing well.  No major issues/concerns. Has had a lot of 19 mm port site pain, starting to subside some.  Denies dysphagia, reflux, nausea, vomiting, and abdominal pain.  Tolerating diet progression - on stage 1.  Getting 100g prot/day.  Drinking 64+fluid oz/day.  The only time he was sick was Monday when he took his MVI.  Given instructions on vitamins.  On Omeprazole .  Holding ASA , NSAIDs , and Steroids.  Ambulating.     Presurgery weight: 302 pounds.  Today's weight is 130 kg (287 lb 8 oz) pounds, today's  Body mass index is 38.99 kg/m²., andHIS@ weight loss since surgery is 15 pounds.       Path reviewed with patient:  Final Diagnosis   STOMACH, SUBTOTAL GASTRECTOMY:                Gastric tissue with no signficant histopathologic change.  Negative for intestinal metaplasia and dysplasia.  No Helicobacter pylori-like organisms identified on routine stains.       Past Medical History:   Diagnosis Date    Anxiety     Chest pain     Depression     Elevated ALT measurement     Former smoker     quit in     Former smoker     GERD (gastroesophageal reflux disease)     episodic; no meds; EGD Dr. Koch     History of MRSA infection     skin infection    History of nicotine vapor product use     Hyperlipidemia     Obesity     Shoulder pain     no meds; previous injury    Snoring     Urinary urgency      Past Surgical History:   Procedure Laterality Date    EAR TUBES      ENDOSCOPY      ENDOSCOPY N/A 3/7/2024    Procedure: ESOPHAGOGASTRODUODENOSCOPY;  Surgeon: Ghassan Koch MD;  Location: [facility];  Service: Bariatric;  Laterality: N/A;  EGD SCOPE # 407 USED    GASTRIC SLEEVE LAPAROSCOPIC N/A  "3/7/2024    Procedure: GASTRIC SLEEVE LAPAROSCOPIC;  Surgeon: Ghassan Koch MD;  Location: [facility];  Service: Bariatric;  Laterality: N/A;     Outpatient Medications Marked as Taking for the 3/14/24 encounter (Office Visit) with Nunu Orozco MD   Medication Sig Dispense Refill    multivitamin with minerals tablet tablet Take 1 tablet by mouth Daily.      omeprazole (priLOSEC) 40 MG capsule Take 1 capsule by mouth Daily for 60 days. 60 capsule 0    ondansetron ODT (ZOFRAN-ODT) 4 MG disintegrating tablet Place 1 tablet on the tongue Every 4 (Four) Hours As Needed for Nausea or Vomiting. 8 tablet 0     No Known Allergies    Social History     Socioeconomic History    Marital status:    Tobacco Use    Smoking status: Former     Current packs/day: 0.00     Average packs/day: 0.5 packs/day for 5.0 years (2.5 ttl pk-yrs)     Types: Cigarettes     Start date: 1/24/2016     Quit date: 1/24/2021     Years since quitting: 3.1    Smokeless tobacco: Former     Types: Chew, Snuff     Quit date: 1/24/2021   Vaping Use    Vaping status: Some Days    Last attempt to quit: 1/8/2022    Substances: Nicotine    Devices: Pre-filled or refillable cartridge, Refillable tank    Passive vaping exposure: Yes   Substance and Sexual Activity    Alcohol use: Yes     Alcohol/week: 1.0 - 3.0 standard drink of alcohol     Types: 1 - 3 Drinks containing 0.5 oz of alcohol per week     Comment: occasional social use    Drug use: No    Sexual activity: Yes     Partners: Female       /68 (BP Location: Right arm, Patient Position: Sitting)   Pulse 69   Temp 97.7 °F (36.5 °C)   Ht 182.9 cm (72\")   Wt 130 kg (287 lb 8 oz)   SpO2 96%   BMI 38.99 kg/m²   Physical Exam  Constitutional:       General: He is not in acute distress.     Appearance: He is well-developed. He is not diaphoretic.   HENT:      Head: Normocephalic and atraumatic.      Mouth/Throat:      Pharynx: No oropharyngeal exudate.   Eyes:      " Conjunctiva/sclera: Conjunctivae normal.      Pupils: Pupils are equal, round, and reactive to light.   Pulmonary:      Effort: Pulmonary effort is normal. No respiratory distress.   Abdominal:      General: There is no distension.      Palpations: Abdomen is soft.      Comments: Incisions are well-healing without induration, erythema, fluctuance, or drainage.       Skin:     General: Skin is warm and dry.      Coloration: Skin is not pale.   Neurological:      Mental Status: He is alert and oriented to person, place, and time.      Cranial Nerves: No cranial nerve deficit.   Psychiatric:         Behavior: Behavior normal.         Thought Content: Thought content normal.           Assessment:   POD # 7             ICD-10-CM ICD-9-CM   1. Obesity, Class II, BMI 35-39.9  E66.9 278.00       Plan:  Doing well. Continue to advance diet per manual.  Increase protein intake to 100g/day.  Actigall Rx given .  Increase exercise/activity as tolerated.  Reviewed lifting restrictions, nothing >25 lbs x 2 more weeks.  Continue vitamins.  Continue PPI.  Continue to avoid ASA/NSAIDs/Steroids x 6 weeks postop.  Call w/ problems/concerns.    The patient was instructed to follow up in 3 weeks, sooner if needed.     Nunu Orozco MD  Answers submitted by the patient for this visit:  Primary Reason for Visit (Submitted on 3/11/2024)  What is the primary reason for your visit?: Other  Other (Submitted on 3/11/2024)  Please describe your symptoms.: Follow up  Have you had these symptoms before?: No  How long have you been having these symptoms?: 1-4 days

## 2024-04-08 ENCOUNTER — OFFICE VISIT (OUTPATIENT)
Dept: BARIATRICS/WEIGHT MGMT | Facility: CLINIC | Age: 29
End: 2024-04-08
Payer: COMMERCIAL

## 2024-04-08 VITALS
WEIGHT: 277.5 LBS | DIASTOLIC BLOOD PRESSURE: 72 MMHG | HEART RATE: 73 BPM | HEIGHT: 72 IN | BODY MASS INDEX: 37.59 KG/M2 | OXYGEN SATURATION: 95 % | SYSTOLIC BLOOD PRESSURE: 116 MMHG | TEMPERATURE: 97.8 F

## 2024-04-08 DIAGNOSIS — R79.0 ABNORMAL BLOOD LEVEL OF IRON: ICD-10-CM

## 2024-04-08 DIAGNOSIS — R79.9 ABNORMAL BLOOD CHEMISTRY: ICD-10-CM

## 2024-04-08 DIAGNOSIS — R74.8 INCREASED LIVER ENZYMES: ICD-10-CM

## 2024-04-08 DIAGNOSIS — R53.83 FATIGUE, UNSPECIFIED TYPE: ICD-10-CM

## 2024-04-08 DIAGNOSIS — E66.9 OBESITY, CLASS II, BMI 35-39.9: Primary | ICD-10-CM

## 2024-04-08 DIAGNOSIS — E55.9 VITAMIN D DEFICIENCY: ICD-10-CM

## 2024-04-08 DIAGNOSIS — R10.13 DYSPEPSIA: ICD-10-CM

## 2024-04-08 PROCEDURE — 99024 POSTOP FOLLOW-UP VISIT: CPT | Performed by: PHYSICIAN ASSISTANT

## 2024-04-08 RX ORDER — OMEPRAZOLE 40 MG/1
40 CAPSULE, DELAYED RELEASE ORAL DAILY
Qty: 30 CAPSULE | Refills: 0 | Status: SHIPPED | OUTPATIENT
Start: 2024-04-08

## 2024-04-08 NOTE — PROGRESS NOTES
[facility]ariat[facility]  [address]  [address]  Formerly [facility] [id number] [phone]      Patient Name:  Estuardo Crooks  :  1995      Date of Visit: 2024      Reason for Visit:  1 month postop    HPI:  Estuardo Crooks is a 28 y.o. male s/p LSG 3/7/24 GDW    Doing well.  No issues/concerns.  Denies dysphagia, reflux, nausea, vomiting, abdominal pain, diarrhea, and constipation.  Tolerating diet progression - on stage 5.  Getting 100g prot/day.  Drinking a protein shake each AM.  Hydrating well w/ water.  Wearing patchAID vitamins - MVI, B12, Vit D.  On Omeprazole  and Actigall .   Increasing physical activity, but not yet exercising routinely.       Presurgery weight:  302 pounds. Today's weight is 126 kg (277 lb 8 oz) pounds, today's Body mass index is 37.64 kg/m²., and weight loss since surgery is 25 pounds.       Past Medical History:   Diagnosis Date    Anxiety     Chest pain     Depression     Elevated ALT measurement     Former smoker     quit in     GERD (gastroesophageal reflux disease)     episodic; no meds; EGD Dr. Koch     History of MRSA infection     skin infection    History of nicotine vapor product use     Hyperlipidemia     Obesity     Shoulder pain     no meds; previous injury    Snoring     Urinary urgency      Past Surgical History:   Procedure Laterality Date    EAR TUBES      ENDOSCOPY      ENDOSCOPY N/A 3/7/2024    Procedure: ESOPHAGOGASTRODUODENOSCOPY;  Surgeon: Ghassan Koch MD;  Location:  [facility];  Service: Bariatric;  Laterality: N/A;  EGD SCOPE # 407 USED    GASTRIC SLEEVE LAPAROSCOPIC N/A 3/7/2024    Procedure: GASTRIC SLEEVE LAPAROSCOPIC;  Surgeon: Ghassan Koch MD;  Location:  [facility];  Service: Bariatric;  Laterality: N/A;     Outpatient Medications Marked as Taking for the 24 encounter (Office Visit) with Jessica Pak PA   Medication Sig Dispense Refill    NON FORMULARY Patch  "Aid    Multivitamin, B12, Vitamin D3      omeprazole (priLOSEC) 40 MG capsule Take 1 capsule by mouth Daily. 30 capsule 0    ursodiol (ACTIGALL) 300 MG capsule Take 1 capsule by mouth 2 (Two) Times a Day for 180 days. 60 capsule 5    [DISCONTINUED] omeprazole (priLOSEC) 40 MG capsule Take 1 capsule by mouth Daily for 60 days. 60 capsule 0     No Known Allergies    Social History     Socioeconomic History    Marital status:    Tobacco Use    Smoking status: Former     Current packs/day: 0.00     Average packs/day: 0.5 packs/day for 5.0 years (2.5 ttl pk-yrs)     Types: Cigarettes     Start date: 1/24/2016     Quit date: 1/24/2021     Years since quitting: 3.2    Smokeless tobacco: Former     Types: Chew, Snuff     Quit date: 1/24/2021   Vaping Use    Vaping status: Some Days    Last attempt to quit: 1/8/2022    Substances: Nicotine    Devices: Pre-filled or refillable cartridge, Refillable tank    Passive vaping exposure: Yes   Substance and Sexual Activity    Alcohol use: Yes     Alcohol/week: 1.0 - 3.0 standard drink of alcohol     Types: 1 - 3 Drinks containing 0.5 oz of alcohol per week     Comment: occasional social use    Drug use: No    Sexual activity: Yes     Partners: Female     Social History     Social History Narrative    [place] lives in [place], he is  with 3 children. He works full time @ [employer].     Has not smoked since 2021       /72 (BP Location: Right arm, Patient Position: Sitting)   Pulse 73   Temp 97.8 °F (36.6 °C)   Ht 182.9 cm (72\")   Wt 126 kg (277 lb 8 oz)   SpO2 95%   BMI 37.64 kg/m²     Physical Exam  Constitutional:       Appearance: He is well-developed.   Cardiovascular:      Rate and Rhythm: Normal rate and regular rhythm.   Pulmonary:      Effort: Pulmonary effort is normal.   Abdominal:      Palpations: Abdomen is soft.      Tenderness: There is no abdominal tenderness.      Hernia: No hernia is present.      Comments: incisions healing " well   Musculoskeletal:         General: Normal range of motion.   Skin:     General: Skin is warm and dry.   Neurological:      Mental Status: He is alert.           Assessment:  1 month s/p LSG 3/7/24 GDW    ICD-10-CM ICD-9-CM   1. Obesity, Class II, BMI 35-39.9  E66.9 278.00   2. Dyspepsia  R10.13 536.8   3. Fatigue, unspecified type  R53.83 780.79   4. Increased liver enzymes  R74.8 790.5   5. Abnormal blood level of iron  R79.0 790.6   6. Vitamin D deficiency  E55.9 268.9   7. Abnormal blood chemistry  R79.9 790.6       Class 2 Severe Obesity (BMI >=35 and <=39.9). Obesity-related health conditions include the following:  see above . Obesity is improving with treatment. BMI is is above average; BMI management plan is completed. We discussed  see plan .       Plan:  Doing well.  Continue to advance diet per manual.  Continue protein 100g/day.  Continue routine physical activity.  Routine bariatric labs ordered.  Continue vitamins w/ adjustments pending lab results.  Continue to avoid ASA/NSAIDS/tobacco/nicotine x 6 weeks postop, steroids x 8 weeks postop.   Call w/ problems/concerns.    The patient was instructed to follow up in 2 months, sooner if needed.

## 2024-04-15 LAB
25(OH)D3+25(OH)D2 SERPL-MCNC: 27.4 NG/ML (ref 30–100)
ALBUMIN SERPL-MCNC: 4.5 G/DL (ref 4.3–5.2)
ALBUMIN/GLOB SERPL: 1.7 {RATIO} (ref 1.2–2.2)
ALP SERPL-CCNC: 89 IU/L (ref 44–121)
ALT SERPL-CCNC: 23 IU/L (ref 0–44)
AST SERPL-CCNC: 17 IU/L (ref 0–40)
BASOPHILS # BLD AUTO: 0 X10E3/UL (ref 0–0.2)
BASOPHILS NFR BLD AUTO: 1 %
BILIRUB SERPL-MCNC: 0.4 MG/DL (ref 0–1.2)
BUN SERPL-MCNC: 15 MG/DL (ref 6–20)
BUN/CREAT SERPL: 21 (ref 9–20)
CALCIUM SERPL-MCNC: 9.7 MG/DL (ref 8.7–10.2)
CHLORIDE SERPL-SCNC: 103 MMOL/L (ref 96–106)
CO2 SERPL-SCNC: 22 MMOL/L (ref 20–29)
CREAT SERPL-MCNC: 0.7 MG/DL (ref 0.76–1.27)
EGFRCR SERPLBLD CKD-EPI 2021: 127 ML/MIN/1.73
EOSINOPHIL # BLD AUTO: 0.1 X10E3/UL (ref 0–0.4)
EOSINOPHIL NFR BLD AUTO: 2 %
ERYTHROCYTE [DISTWIDTH] IN BLOOD BY AUTOMATED COUNT: 12 % (ref 11.6–15.4)
FERRITIN SERPL-MCNC: 616 NG/ML (ref 30–400)
FOLATE SERPL-MCNC: 9.8 NG/ML
GLOBULIN SER CALC-MCNC: 2.7 G/DL (ref 1.5–4.5)
GLUCOSE SERPL-MCNC: 92 MG/DL (ref 70–99)
HCT VFR BLD AUTO: 44.6 % (ref 37.5–51)
HGB BLD-MCNC: 15 G/DL (ref 13–17.7)
IMM GRANULOCYTES # BLD AUTO: 0 X10E3/UL (ref 0–0.1)
IMM GRANULOCYTES NFR BLD AUTO: 0 %
IRON SERPL-MCNC: 63 UG/DL (ref 38–169)
LYMPHOCYTES # BLD AUTO: 1.6 X10E3/UL (ref 0.7–3.1)
LYMPHOCYTES NFR BLD AUTO: 24 %
MCH RBC QN AUTO: 30.4 PG (ref 26.6–33)
MCHC RBC AUTO-ENTMCNC: 33.6 G/DL (ref 31.5–35.7)
MCV RBC AUTO: 90 FL (ref 79–97)
METHYLMALONATE SERPL-SCNC: 94 NMOL/L (ref 0–378)
MONOCYTES # BLD AUTO: 0.7 X10E3/UL (ref 0.1–0.9)
MONOCYTES NFR BLD AUTO: 10 %
NEUTROPHILS # BLD AUTO: 4.3 X10E3/UL (ref 1.4–7)
NEUTROPHILS NFR BLD AUTO: 63 %
PLATELET # BLD AUTO: 240 X10E3/UL (ref 150–450)
POTASSIUM SERPL-SCNC: 3.9 MMOL/L (ref 3.5–5.2)
PREALB SERPL-MCNC: 19 MG/DL (ref 14–35)
PROT SERPL-MCNC: 7.2 G/DL (ref 6–8.5)
RBC # BLD AUTO: 4.94 X10E6/UL (ref 4.14–5.8)
SODIUM SERPL-SCNC: 140 MMOL/L (ref 134–144)
VIT B1 BLD-SCNC: 114.7 NMOL/L (ref 66.5–200)
WBC # BLD AUTO: 6.7 X10E3/UL (ref 3.4–10.8)

## 2024-05-21 RX ORDER — OMEPRAZOLE 40 MG/1
40 CAPSULE, DELAYED RELEASE ORAL DAILY
Qty: 30 CAPSULE | Refills: 0 | OUTPATIENT
Start: 2024-05-21

## 2024-06-10 ENCOUNTER — OFFICE VISIT (OUTPATIENT)
Dept: BARIATRICS/WEIGHT MGMT | Facility: CLINIC | Age: 29
End: 2024-06-10
Payer: COMMERCIAL

## 2024-06-10 VITALS
BODY MASS INDEX: 35.08 KG/M2 | HEIGHT: 72 IN | WEIGHT: 259 LBS | HEART RATE: 98 BPM | TEMPERATURE: 96.9 F | DIASTOLIC BLOOD PRESSURE: 66 MMHG | SYSTOLIC BLOOD PRESSURE: 118 MMHG

## 2024-06-10 DIAGNOSIS — E66.9 OBESITY, CLASS II, BMI 35-39.9: Primary | ICD-10-CM

## 2024-06-10 DIAGNOSIS — Z90.3 POSTGASTRECTOMY MALABSORPTION: ICD-10-CM

## 2024-06-10 DIAGNOSIS — E78.5 HYPERLIPIDEMIA, UNSPECIFIED HYPERLIPIDEMIA TYPE: ICD-10-CM

## 2024-06-10 DIAGNOSIS — R79.0 ABNORMAL BLOOD LEVEL OF IRON: ICD-10-CM

## 2024-06-10 DIAGNOSIS — E55.9 VITAMIN D DEFICIENCY: ICD-10-CM

## 2024-06-10 DIAGNOSIS — R53.83 FATIGUE, UNSPECIFIED TYPE: ICD-10-CM

## 2024-06-10 DIAGNOSIS — K91.2 POSTGASTRECTOMY MALABSORPTION: ICD-10-CM

## 2024-06-10 PROCEDURE — 99214 OFFICE O/P EST MOD 30 MIN: CPT | Performed by: PHYSICIAN ASSISTANT

## 2024-06-10 RX ORDER — URSODIOL 300 MG/1
300 CAPSULE ORAL 2 TIMES DAILY
Qty: 180 CAPSULE | Refills: 0 | Status: SHIPPED | OUTPATIENT
Start: 2024-06-10 | End: 2024-09-08

## 2024-06-10 NOTE — PROGRESS NOTES
[facility] Bariatric Surgery  [address]  [address]  [facility] [address] [phone]      Patient Name:  Estuardo Crooks  :  1995      Date of Visit: 06/10/2024      Reason for Visit:  3 months postop    HPI:  Estuardo Crooks is a 29 y.o. male s/p LSG 3/7/24 GDW    Doing well.  No issues/concerns.  Denies dysphagia, reflux, nausea, vomiting, abdominal pain, diarrhea, and constipation.  Tolerated diet progression.  Getting 100g prot/day.  Hydrating well w/ water.  No longer taking/needing PPI.  Ran out of MiName, pharmacy did not refill for some reason (?).  Physical activity: 5x/week.    Labs 24 - low Vit D, high ferritin.  Wearing patchAID vitamins - MVI, B12, Vit D + wkly Vit D.     Presurgery weight:  302 pounds. Today's weight is 117 kg (259 lb) pounds, today's Body mass index is 35.13 kg/m²., and weight loss since surgery is 43 pounds.       Past Medical History:   Diagnosis Date    Anxiety     Chest pain     Depression     Elevated ALT measurement     Former smoker     quit in     GERD (gastroesophageal reflux disease)     episodic; no meds; EGD Dr. Koch     History of MRSA infection     skin infection    History of nicotine vapor product use     Hyperlipidemia     Obesity     Shoulder pain     no meds; previous injury    Snoring     Urinary urgency      Past Surgical History:   Procedure Laterality Date    EAR TUBES      ENDOSCOPY      ENDOSCOPY N/A 3/7/2024    Procedure: ESOPHAGOGASTRODUODENOSCOPY;  Surgeon: Ghassan Koch MD;  Location:  [facility];  Service: Bariatric;  Laterality: N/A;  EGD SCOPE # 407 USED    GASTRIC SLEEVE LAPAROSCOPIC N/A 3/7/2024    Procedure: GASTRIC SLEEVE LAPAROSCOPIC;  Surgeon: Ghassan Koch MD;  Location:  [facility];  Service: Bariatric;  Laterality: N/A;     Outpatient Medications Marked as Taking for the 6/10/24 encounter (Office Visit) with Jessica Pak PA   Medication Sig Dispense Refill     "Cholecalciferol 1.25 MG (51325 UT) tablet Take 1 tablet by mouth 1 (One) Time Per Week. 12 tablet 0    NON FORMULARY Patch Aid    Multivitamin, B12, Vitamin D3       No Known Allergies    Social History     Socioeconomic History    Marital status:    Tobacco Use    Smoking status: Former     Current packs/day: 0.00     Average packs/day: 0.5 packs/day for 5.0 years (2.5 ttl pk-yrs)     Types: Cigarettes     Start date: 1/24/2016     Quit date: 1/24/2021     Years since quitting: 3.3    Smokeless tobacco: Former     Types: Chew, Snuff     Quit date: 1/24/2021   Vaping Use    Vaping status: Some Days    Last attempt to quit: 1/8/2022    Substances: Nicotine    Devices: Pre-filled or refillable cartridge, Refillable tank    Passive vaping exposure: Yes   Substance and Sexual Activity    Alcohol use: Not Currently     Alcohol/week: 1.0 - 3.0 standard drink of alcohol     Comment: occasional social use    Drug use: No    Sexual activity: Yes     Partners: Female     Birth control/protection: I.U.D.     Social History     Social History Narrative    Pt lives in [facility] [place], he is  with 3 children. He works full time @ [employer].     Has not smoked since 2021       /66 (BP Location: Right arm, Patient Position: Sitting)   Pulse 98   Temp 96.9 °F (36.1 °C)   Ht 182.9 cm (72\")   Wt 117 kg (259 lb)   BMI 35.13 kg/m²     Physical Exam  Constitutional:       General: He is not in acute distress.     Appearance: He is well-developed. He is not diaphoretic.   Eyes:      General: No scleral icterus.  Cardiovascular:      Rate and Rhythm: Normal rate.   Pulmonary:      Effort: Pulmonary effort is normal.   Musculoskeletal:         General: Normal range of motion.   Neurological:      Mental Status: He is alert and oriented to person, place, and time.   Psychiatric:         Mood and Affect: Mood normal.         Judgment: Judgment normal.           Assessment:  3 months s/p LSG 3/7/24 GDW   "  ICD-10-CM ICD-9-CM   1. Obesity, Class II, BMI 35-39.9  E66.9 278.00   2. Hyperlipidemia, unspecified hyperlipidemia type  E78.5 272.4   3. Fatigue, unspecified type  R53.83 780.79   4. Postgastrectomy malabsorption  K91.2 579.3    Z90.3    5. Abnormal blood level of iron  R79.0 790.6   6. Vitamin D deficiency  E55.9 268.9         Class 2 Severe Obesity (BMI >=35 and <=39.9). Obesity-related health conditions include the following:  see above . Obesity is improving with treatment. BMI is is above average; BMI management plan is completed. We discussed  see plan .       Plan:  Doing well.  Continue protein 100g/day.  Continue routine physical activity.  Routine bariatric labs ordered.  Continue vitamins w/ adjustments pending lab results.  RX Actigall - take as directed.  Call w/ problems/concerns.    The patient was instructed to follow up in 3 months, sooner if needed.

## 2024-06-13 ENCOUNTER — TELEPHONE (OUTPATIENT)
Dept: FAMILY MEDICINE CLINIC | Facility: TELEHEALTH | Age: 29
End: 2024-06-13

## 2024-06-13 ENCOUNTER — E-VISIT (OUTPATIENT)
Dept: FAMILY MEDICINE CLINIC | Facility: TELEHEALTH | Age: 29
End: 2024-06-13
Payer: COMMERCIAL

## 2024-06-13 PROCEDURE — BRIGHTMDVISIT: Performed by: NURSE PRACTITIONER

## 2024-06-13 NOTE — TELEPHONE ENCOUNTER
Caller: Estuardo Crooks    Relationship: Self    Best call back number: [phone]    What is the best time to reach you: ANYTIME    Who are you requesting to speak with (clinical staff, provider,  specific staff member): NURSE         What was the call regarding: PATIENT IS CALLING CHECKING TO SEE IF MEDICATION AS BEEN SENT TO PHARMACY THAT WAS DISCUSSED AT VIRTUAL VISIT THIS MORNING   THEY STATE THEY HAVE NOT RECEIVED IT.

## 2024-06-13 NOTE — E-VISIT TREATED
Chief Complaint: Rashes and other skin conditions   Patient introduction   Patient is 29-year-old male presenting with pruritic, nonpainful bilateral rash on their face, chest, abdomen, groin, arms, and right hand for 5 to 7 days.   Rash is itchy, red, and swollen. Rash is not bleeding. Rash is not warm to the touch.   Before the rash appeared, patient had contact with poison oak/ivy/sumac in the affected area.   General presentation   No fever, chills, myalgia, nausea, vomiting, diarrhea, headache, joint pain/swelling, swelling around the eyes, or fatigue/lethargy. Rash is not present on genitals.   The following treatments were not helpful for current rash symptoms:    Acetaminophen    Diphenhydramine lotion    Diphenhydramine pills    An allergy medication such as fexofenadine, loratadine, or cetirizine    Calamine lotion    Ibuprofen    Moisturizing lotion    Non-prescription antifungal cream    Non-prescription antifungal shampoo    Non-prescription steroid cream    Prednisone    Prescription antifungal cream    Prescription antifungal shampoo    Prescription steroid cream    An oral antifungal   Eczema: Patient has family history of atopic conditions.   Scabies: No recent scabies exposure.   Impetigo: No recent impetigo exposure.   Pet dander: No exposure to a new pet.   A friend or family member has a similar rash.   Pruritus described as moderate and is worsening. Itching makes daily activities difficult. Itching does not affect sleep.   Rash has spread to a new location.   No history of prior MRSA infection.   Review of red flags/alarm symptoms:    No systemic symptoms    No symptoms of anaphylactic reaction    No swollen lymph nodes    No recent history suggesting adverse drug rash (no new medication, herb, or supplement)   Current medications   Currently taking Multi Vitamin+, B12, and cholecalciferol (vitamin D3).   Medication allergies   None.   Medication contraindication review   Not currently  taking ACE inhibitors or ARBs.   No cerebral malaria, CHF, cutaneous jcptv-kmhwje-zqaw disease, folate deficiency, G6PD deficiency (or breastfeeding a child with G6PD deficiency), generalized erythroderma, liver disease, lamellar ichthyosis, malignancy or premalignancy at the affected site, megaloblastic anemia, mononucleosis, Netherton syndrome, peripheral neuropathy, porphyria, QT prolongation, congenital long QT syndrome, skin barrier defect condition, systemic mycoses, TMP/SMX-associated thrombocytopenia, thyroid dysfunction, or ulcerative colitis.   Past medical history   Immune conditions: No immunocompromising conditions.   No history of cancer.   Patient's last tetanus vaccination or booster was within the last 5 years.   Patient-submitted comments   Patient was asked if they had anything to add about their symptoms. Patient writes: Got into poison ivy on Friday afternoon (6-7-24) and it has continued to spread. I have used witch hazel and Ivy Dry spray with no luck. Some steroids and steroid cream would likely clear this up as that is what it has taken in the past..   Patient did not request an excuse note.   Assessment   Poison ivy/oak/sumac.   This is the likely diagnosis based on interview responses and patient-submitted photos, including:    Symptom profile    Recent known exposure to poison oak/ivy/sumac   Plan   Medications:    hydrocortisone 2.5 % topical cream RX 2.5% apply thin film on affected area bid 14d Use for up to 14 days. Discontinue sooner if symptoms clear up completely. Amount is 30 g.    prednisone 10 mg tablet RX 10mg as directed PO qd 15d taper. 4 tabs PO QD x 5d, then 3 tabs PO QD x 2d, then 2 tabs PO QD x 2d, then 1 tab PO QD x 2d, then 1/2 tab PO QD x 4d. Take with food. Amount is 34 tab.    diphenhydramine 25 mg tablet OTC 25mg 1 tab PO q4h PRN 30d for itching. May cause drowsiness. Do not exceed 300 mg (12 tablets) in a 24-hour period. Brands to look for include Allermax and  Benadryl. Amount is 180 tab.   The patient's prescriptions will be sent to:   [id number]   [address] [facility] [id number]   Phone: [phone]     Fax: [phone]   Patient informed to purchase OTC medication.   Education:    Condition and causes    Prevention    Treatment and self-care    When to call provider   ----------   Electronically signed by SHAQ Corey on 2024-06-13 at 08:30AM   ----------   Patient Interview Transcript:   Where is the affected area located? Select all that apply.    Face    Arm    Hand    Chest    Stomach    Groin   Not selected:    Scalp    Inside mouth or on lips    Neck    Back    Buttocks    Leg    Foot or in between toes    None of the above   Which arm is bothering you? Select one.    Both   Not selected:    Right    Left   Which hand is bothering you? Select one.    Right   Not selected:    Left    Both   Before your skin symptoms appeared, were you exposed to any of these possible triggers? Select all that apply.    Contact with poison oak, poison ivy, or poison sumac in the affected area   Not selected:    Tick bite    Other insect bite or sting in the affected area    Dog or cat bite    Cut, scrape, or other skin injury in the affected area    Contact with a new soap, perfume, skincare product, cleaning product, or other chemical in the affected area    Wearing new jewelry, belt buckle, or other metal accessory in the affected area    Taking a new medication, supplement, or herb    Consuming a new food or drink    A new pet or animal, either at home or somewhere else    Vaccine injection    Exposure to extreme hot or cold temperatures    Exercising intensely    Sitting in a hot tub or swimming in a heated swimming pool    Wearing ill-fitting shoes or socks for a long time    Contact with someone who has a similar rash    Contact with someone who has impetigo    Contact with someone who has scabies    Other (specify)    None of the above    Are your skin symptoms on one or both sides of your body? Select one.    Both sides   Not selected:    One side only   You mentioned your symptoms are in your groin area. Are they on your genitals (scrotum, penis, or vulva)? Select one.    No   Not selected:    Yes   Since your skin symptoms first appeared, have they spread or shown up in new locations? This includes covering a larger area than they first did, or spreading to another part of the body. Select one.    Yes   Not selected:    No   Which of these describe the affected area? Select all that apply.    Itchy    Red    Swollen   Not selected:    Painful    Warmer than other areas of my skin    None of the above   How intense is the itching? Select one.    Moderate   Not selected:    Mild    Severe    Unbearable   Has the itching gotten better, worse, or stayed the same? Select one.    Worse   Not selected:    Better    Same   Has the itching made daily activities difficult? Select one.    Yes   Not selected:    No   Has the itching made it difficult to sleep? Select one.    No   Not selected:    Yes   Have you noticed any bleeding from the rash? Select one.    No   Not selected:    Yes   How long have you had these current skin symptoms? Select one.    5 to 7 days   Not selected:    Less than 24 hours    24 to 48 hours    2 to 3 days    3 to 5 days    1 to 2 weeks    More than 2 weeks   To recommend the best treatment for you, we need to see photos of the affected area.   [image: [email or web address]]   Close-up detail (for size, shape, and color)   [image: [email or web address]]   Surrounding area (to compare with healthy skin)   [image: [email or web address]]   Affected area at a distance (for scale and location)   If you choose not to send photos, you'll need to speak with a provider to get care.    Select one.    OK, I'll send photos.   Not selected:    I'd rather not send photos. Show me my care options.   Send  at least 3 photos for review - Don't use a flash. - Make sure the photos are in focus. - Take a close-up photo (for size, shape, color). - Take a photo showing surrounding area (to compare with healthy skin). - Take a photo with a quarter coin or ruler near the affected area to show scale. - If more than one location is affected, repeat for each location.    Upload 1    Upload 2    Upload 3    Upload 4    Upload 5   A rash can be a sign of a more serious condition. These conditions may need in-person care for an exam or lab tests. Along with your skin symptoms, have you had any of these symptoms? Select all that apply.    None of these   Not selected:    Fever    Chills    Body aches or muscle aches    Joint pain or swelling, including the hands    Swelling around the eyes    Nausea    Vomiting    Diarrhea    Headache    Fatigue, exhaustion, or lethargy (feeling drowsy, dull, or low energy)   Have you had swollen lymph nodes? Swollen lymph nodes are small lumps under the skin that are soft, tender, and often painful. They may be noticed in the neck, the armpits, or the groin. Select one.    No, not that I've noticed   Not selected:    Yes   Along with your skin symptoms, have you had any of these symptoms? Select all that apply.    None of these   Not selected:    Chest pains or rapid heartbeat    Shortness of breath or wheezing    Swelling of lips or tongue    Throat tightness or hoarse voice    Stomach cramps, diarrhea, or vomiting    Confusion, dizziness, or tunnel vision    Drooling    Loss of consciousness, passing out, or fainting   Do any of your friends or family members have skin symptoms similar to yours? Select one.    Yes   Not selected:    No, not that I know of   Do you or does anyone in your family have a history of allergies (hay fever, seasonal allergies), eczema, or asthma? Select all that apply.    __Yes, a family member does __   Not selected:    Yes, I do    No, not that I know of   Have you  ever been treated for a MRSA (methicillin-resistant Staphylococcus aureus) infection? MRSA is a type of bacteria that's resistant to many commonly used antibiotics. Select one.    No, not that I know of   Not selected:    Yes   Do you have any of these conditions? Scroll to see all options. Select all that apply.    None of the above   Not selected:    Cerebral malaria    Congenital long QT syndrome    Folate deficiency    Systemic fungal infection, such as invasive candidiasis    G6PD deficiency, or breastfeeding a child with G6PD deficiency    Infection at the affected area    Megaloblastic anemia    Mono (mononucleosis)    Decreased sensation in feet (peripheral neuropathy)    Porphyria    Skin cancer or pre-malignancy at the affected area    A serious skin condition (skin barrier defect condition, Netherton syndrome, lamellar ichthyosis, generalized erythroderma, or cutaneous jybti-lopphc-hidx disease)    QT prolongation    Thyroid dysfunction    Ulcerative colitis   Do you have any of these conditions that can affect the immune system? Scroll to see all options. Select all that apply.    None of these   Not selected:    History of bone marrow transplant    Chronic kidney disease    Chronic liver disease (including cirrhosis)    HIV/AIDS    Inflammatory bowel disease (Crohn's disease or ulcerative colitis)    Lupus    Moderate to severe plaque psoriasis    Multiple sclerosis    Rheumatoid arthritis    Sickle cell anemia    Alpha or beta thalassemia    History of kidney, liver, heart, or other solid organ transplant    History of liver, heart, or other solid organ transplant    History of spleen removal    An autoimmune disorder not listed here (specify)    A condition requiring treatment with long-term use of oral steroids (such as prednisone, prednisolone, or dexamethasone) (specify)   Have you ever been diagnosed with cancer? Select one.    No   Not selected:    Yes, I have cancer now    Yes, but I'm in  remission   Are you currently being treated for type 1 or type 2 diabetes? Select one.    No   Not selected:    Yes   When was your last tetanus shot (vaccination or booster)? Select one.    Within the last 5 years   Not selected:    5 to 10 years ago    More than 10 years ago    I'm not sure   Have you tried any treatments for your current symptoms? Select one.    Yes   Not selected:    No   Acetaminophen (Tylenol)    Not helpful   Not selected:    Helpful   Diphenhydramine (Benadryl) lotion    Not helpful   Not selected:    Helpful   Diphenhydramine (Benadryl) pills    Not helpful   Not selected:    Helpful   An allergy pill such as fexofenadine (Allegra), loratadine (Claritin), or cetirizine (Zyrtec)    Not helpful   Not selected:    Helpful   Calamine lotion    Not helpful   Not selected:    Helpful   Ibuprofen (Advil, Motrin)    Not helpful   Not selected:    Helpful   A lotion or cream for dry skin    Not helpful   Not selected:    Helpful   A non-prescription antifungal cream    Not helpful   Not selected:    Helpful   A non-prescription antifungal shampoo    Not helpful   Not selected:    Helpful   A non-prescription steroid cream (such as hydrocortisone)    Not helpful   Not selected:    Helpful   Prednisone tablets    Not helpful   Not selected:    Helpful   A prescription antifungal cream    Not helpful   Not selected:    Helpful   A prescription antifungal shampoo    Not helpful   Not selected:    Helpful   A prescription steroid cream    Not helpful   Not selected:    Helpful   An oral antifungal medication    Not helpful   Not selected:    Helpful   Have you taken any monoamine oxidase inhibitor (MAOI) medications in the last 14 days? Examples include rasagiline (Azilect), selegiline (Eldepryl, Zelapar), isocarboxazid (Marplan), phenelzine (Nardil), and tranylcypromine (Parnate). Select one.    No, not that I know of   Not selected:    Yes   Are you taking any of these medications? Select all that  apply.    No   Not selected:    An ACE inhibitor, such as lisinopril, enalapril, captopril, or benazepril    An angiotensin II receptor blocker (ARB), such as candesartan, irbesartan, losartan, or valsartan    Kynmobi or Apokyn (apomorphine)   Are you taking any other medications, vitamins, or supplements? Select one.    Yes   Not selected:    No   Have you ever had an allergic or bad reaction to any medication? Select one.    No   Not selected:    Yes   Have you ever had jaundice or liver problems as a result of taking amoxicillin-clavulanate (Augmentin)? Jaundice is a condition in which the skin and the whites of the eyes turn yellow. Select all that apply.    No, not that I know of   Not selected:    Yes, jaundice    Yes, liver problems   Do you need a doctor's note? A doctor's note confirms that you received care today and states when you can return to school or work. It does not contain information about your diagnosis or treatment plan. Your provider will make the final decision on whether to give you a doctor's note. Doctor's notes cannot be backdated. Select one.    No   Not selected:    Today only (1 day)    Today and tomorrow (2 days)    3 days   Is there anything you'd like to add about your symptoms? Please limit your comments to the symptoms covered in this interview. If you include comments about other concerns, your provider may recommend that you be seen in person.    Got into poison ivy on Friday afternoon (6-7-24) and it has continued to spread. I have used witch hazel and Ivy Dry spray with no luck. Some steroids and steroid cream would likely clear this up as that is what it has taken in the past.   ----------   Medical history   Medical history data does not currently exist for this patient.

## 2024-06-13 NOTE — EXTERNAL PATIENT INSTRUCTIONS
Diagnosis   Poison ivy/oak/sumac   My name is SHAQ Corey, and I'm a healthcare provider at [facility]. After reviewing your responses and photos, I suspect that your rash was caused by exposure to poison ivy/oak/sumac plants.   Medications   Your pharmacy   [id number] [address] [facility] [id number] [phone]     Prescription   Hydrocortisone topical cream (2.5%): Apply thin film on affected area twice a day as needed for up to 14 days. Discontinue sooner if symptoms clear up completely.   Prednisone oral tablet (10mg): Take 4 tablets once daily (for a total of 40mg each day) for 5 days. Then, take 3 tablets once daily (for a total of 30mg each day) for 2 days. Then, take 2 tablets once daily (for a total of 20mg each day) for 2 days. Then, take 1 tablet once daily (for a total of 10mg each day) for 2 days. Finally, take one-half tablet once daily (for a total of 5mg each day) for 4 days. This is a tapered dose lasting 15 days. Take with food.   Non-prescription   Diphenhydramine oral tablet (25mg): For itching. Take 1 tablet by mouth every 4 hours as needed. May cause drowsiness. Do not exceed 300 mg (12 tablets) in a 24-hour period. Brands to look for include Allermax and Benadryl.   About your diagnosis   Poison oak, poison ivy, and poison sumac plants all contain an oil called urushiol. Your recent contact with one of these plants is the likely cause of your rash. Urushiol causes many people to develop an itchy, red rash with bumps and blisters. It can often take 12 to 72 hours after first touching these plants before a rash appears.   Poison oak/ivy/sumac isn't contagious. Only the urushiol oils from the plants can cause the rash, not the clear fluid weeping from the blisters.   What to expect   A poison oak/ivy/sumac rash can last for 1 to 3 weeks. The worst symptoms seem to come about 4 to 7 days after contact with these plant oils.   When to seek care   Call us  at [phone]   with any sudden or unexpected symptoms.    The rash doesn't get better after 2 to 3 weeks.    Signs of an allergic reaction, including swelling or difficulty breathing.    The itching becomes severe and uncontrollable.    The rash moves into sensitive areas, including your eyes, face, lips, and genitals.    Signs of infection, including pus/yellow fluid leaking from the blisters, odor to the fluid, or increased tenderness.   Other treatment    Wash the area gently and thoroughly with mild soap and warm water. The oil enters the skin quickly, so try to wash it off within 30 minutes of contact. Don't scrub hard; this isn't needed and might make your reaction worse.    Don't use antihistamine creams or lotions, anesthetic creams containing benzocaine, or antibiotic creams containing neomycin or bacitracin on the rash. These could make the rash worse.    Trim your fingernails and avoid scratching the rash. Scratching can lead to a skin infection and scarring.    Avoid heat. Body heat, sweating, and hot water can all make the rash worse. Use cool compresses and calamine lotion to help relieve itching.    Wear loose cotton clothing to avoid further irritating your skin.   Prevention    Wear heavy-duty vinyl gloves when working in the yard. The oils from toxic plants can seep through rubber or latex gloves.    Wear protective clothing, including long sleeves, long pants, and socks when in areas where these plants grow.    Before going into areas where these plants grow, apply skin products designed to block the oil from entering your skin.    Learn to identify these plants and remove any growing near your house.    Wash clothing and shoes that may have touched these plants. The oil can linger for a long time.    Be wary of plant oils on pets.   Your provider   Your diagnosis was provided by SHAQ Corey, a member of your trusted care team at [facility].   If you have any questions, call us  at [phone]  .

## 2024-06-14 LAB
25(OH)D3+25(OH)D2 SERPL-MCNC: 59.9 NG/ML (ref 30–100)
ALBUMIN SERPL-MCNC: 4.8 G/DL (ref 4.1–5.1)
ALBUMIN/GLOB SERPL: 1.8 {RATIO}
ALP SERPL-CCNC: 100 IU/L (ref 44–121)
ALT SERPL-CCNC: 17 IU/L (ref 0–44)
AST SERPL-CCNC: 20 IU/L (ref 0–40)
BASOPHILS # BLD AUTO: 0.1 X10E3/UL (ref 0–0.2)
BASOPHILS NFR BLD AUTO: 1 %
BILIRUB SERPL-MCNC: 0.5 MG/DL (ref 0–1.2)
BUN SERPL-MCNC: 11 MG/DL (ref 6–20)
BUN/CREAT SERPL: 14 (ref 9–20)
CALCIUM SERPL-MCNC: 10.4 MG/DL (ref 8.7–10.2)
CHLORIDE SERPL-SCNC: 101 MMOL/L (ref 96–106)
CO2 SERPL-SCNC: 26 MMOL/L (ref 20–29)
CREAT SERPL-MCNC: 0.77 MG/DL (ref 0.76–1.27)
EGFRCR SERPLBLD CKD-EPI 2021: 123 ML/MIN/1.73
EOSINOPHIL # BLD AUTO: 0.1 X10E3/UL (ref 0–0.4)
EOSINOPHIL NFR BLD AUTO: 1 %
ERYTHROCYTE [DISTWIDTH] IN BLOOD BY AUTOMATED COUNT: 12.1 % (ref 11.6–15.4)
FERRITIN SERPL-MCNC: 471 NG/ML (ref 30–400)
FOLATE SERPL-MCNC: 4.9 NG/ML
GLOBULIN SER CALC-MCNC: 2.6 G/DL (ref 1.5–4.5)
GLUCOSE SERPL-MCNC: 99 MG/DL (ref 70–99)
HCT VFR BLD AUTO: 47.8 % (ref 37.5–51)
HGB BLD-MCNC: 15.9 G/DL (ref 13–17.7)
IMM GRANULOCYTES # BLD AUTO: 0 X10E3/UL (ref 0–0.1)
IMM GRANULOCYTES NFR BLD AUTO: 0 %
IRON SERPL-MCNC: 83 UG/DL (ref 38–169)
LYMPHOCYTES # BLD AUTO: 1.3 X10E3/UL (ref 0.7–3.1)
LYMPHOCYTES NFR BLD AUTO: 25 %
MCH RBC QN AUTO: 30.5 PG (ref 26.6–33)
MCHC RBC AUTO-ENTMCNC: 33.3 G/DL (ref 31.5–35.7)
MCV RBC AUTO: 92 FL (ref 79–97)
METHYLMALONATE SERPL-SCNC: 93 NMOL/L (ref 0–378)
MONOCYTES # BLD AUTO: 0.5 X10E3/UL (ref 0.1–0.9)
MONOCYTES NFR BLD AUTO: 10 %
NEUTROPHILS # BLD AUTO: 3.3 X10E3/UL (ref 1.4–7)
NEUTROPHILS NFR BLD AUTO: 63 %
PLATELET # BLD AUTO: 228 X10E3/UL (ref 150–450)
POTASSIUM SERPL-SCNC: 4.8 MMOL/L (ref 3.5–5.2)
PREALB SERPL-MCNC: 21 MG/DL (ref 14–35)
PROT SERPL-MCNC: 7.4 G/DL (ref 6–8.5)
RBC # BLD AUTO: 5.21 X10E6/UL (ref 4.14–5.8)
SODIUM SERPL-SCNC: 142 MMOL/L (ref 134–144)
VIT B1 BLD-SCNC: 89 NMOL/L (ref 66.5–200)
WBC # BLD AUTO: 5.2 X10E3/UL (ref 3.4–10.8)

## 2024-08-13 ENCOUNTER — TELEMEDICINE (OUTPATIENT)
Dept: FAMILY MEDICINE CLINIC | Facility: TELEHEALTH | Age: 29
End: 2024-08-13
Payer: COMMERCIAL

## 2024-08-13 VITALS — TEMPERATURE: 102.1 F

## 2024-08-13 DIAGNOSIS — Z20.818 STREP THROAT EXPOSURE: ICD-10-CM

## 2024-08-13 DIAGNOSIS — R50.9 FEVER, UNSPECIFIED FEVER CAUSE: ICD-10-CM

## 2024-08-13 DIAGNOSIS — J02.9 PHARYNGITIS, UNSPECIFIED ETIOLOGY: Primary | ICD-10-CM

## 2024-08-13 DIAGNOSIS — R51.9 NONINTRACTABLE HEADACHE, UNSPECIFIED CHRONICITY PATTERN, UNSPECIFIED HEADACHE TYPE: ICD-10-CM

## 2024-08-13 RX ORDER — AMOXICILLIN 875 MG/1
875 TABLET, COATED ORAL 2 TIMES DAILY
Qty: 20 TABLET | Refills: 0 | Status: SHIPPED | OUTPATIENT
Start: 2024-08-13

## 2024-08-13 NOTE — PROGRESS NOTES
You have chosen to receive care through a telehealth visit.  Do you consent to use a video/audio connection for your medical care today? Yes     HPI  Estuardo Crooks is a 29 y.o. male  presents with complaint of exposed to strep throat by his daughter. Today he has developed sore throat, fever (101.3) headache pain, dizziness and fatigue. His throat does not have any exudate but it is red.     Review of Systems   Constitutional:  Positive for activity change, chills, fatigue and fever.   HENT:  Positive for sore throat.    Respiratory: Negative.     Cardiovascular: Negative.    Gastrointestinal: Negative.    Musculoskeletal: Negative.    Skin: Negative.    Neurological:  Positive for dizziness and headaches.   Hematological: Negative.    Psychiatric/Behavioral: Negative.         Past Medical History:   Diagnosis Date    Anxiety     Chest pain     Depression     Elevated ALT measurement     Former smoker     quit in 2021    GERD (gastroesophageal reflux disease)     episodic; no meds; EGD Dr. Koch 12/23    History of MRSA infection     skin infection    History of nicotine vapor product use     Hyperlipidemia     Obesity     Shoulder pain     no meds; previous injury    Snoring     Urinary urgency        Family History   Problem Relation Age of Onset    Diabetes Mother         Pre Diabetes    Obesity Mother     Hypertension Mother     Sleep apnea Mother     Sleep apnea Father     Hypertension Father     Obesity Father     Arthritis Father     Colon polyps Father     Diabetes Father         Pre diabetic    Sleep apnea Sister     Hypertension Sister     Obesity Sister     Diabetes Sister         Pre diabetic    Drug abuse Sister     Sleep apnea Maternal Grandmother     Hypertension Maternal Grandmother     Obesity Maternal Grandmother     Heart attack Maternal Grandmother     COPD Maternal Grandmother     Alcohol abuse Maternal Grandmother     Heart disease Maternal Grandmother     Heart disease Maternal  Grandfather     Sleep apnea Maternal Grandfather     Hypertension Maternal Grandfather     Obesity Maternal Grandfather     Diabetes Maternal Grandfather     Heart attack Paternal Grandmother     Heart disease Paternal Grandmother     Hypertension Paternal Grandmother     Obesity Paternal Grandmother     Sleep apnea Paternal Grandmother     Diabetes Paternal Grandmother        Social History     Socioeconomic History    Marital status:    Tobacco Use    Smoking status: Former     Current packs/day: 0.00     Average packs/day: 0.5 packs/day for 5.0 years (2.5 ttl pk-yrs)     Types: Cigarettes     Start date: 1/24/2016     Quit date: 1/24/2021     Years since quitting: 3.5    Smokeless tobacco: Former     Types: Chew, Snuff     Quit date: 1/24/2021   Vaping Use    Vaping status: Some Days    Last attempt to quit: 1/8/2022    Substances: Nicotine    Devices: Pre-filled or refillable cartridge, Refillable tank    Passive vaping exposure: Yes   Substance and Sexual Activity    Alcohol use: Not Currently     Alcohol/week: 1.0 - 3.0 standard drink of alcohol     Comment: occasional social use    Drug use: No    Sexual activity: Yes     Partners: Female     Birth control/protection: I.U.D.         Temp (!) 102.1 °F (38.9 °C)     PHYSICAL EXAM  Physical Exam   Constitutional: He is oriented to person, place, and time. He appears well-developed and well-nourished. He has a sickly appearance. He does not appear ill. No distress.   HENT:   Head: Normocephalic and atraumatic.   Right Ear: Hearing normal.   Left Ear: Hearing normal.   Nose: Nose normal.   Mouth/Throat: Mouth/Lips are normal.No oropharyngeal exudate or tonsillar abscesses.   Oral pharynx difficult to assess but no exudate noted and oral pharynx is erythremic.    Pulmonary/Chest: Effort normal.  No respiratory distress. He no audible wheeze...  Neurological: He is alert and oriented to person, place, and time.   Psychiatric: He has a normal mood and affect.        Diagnoses and all orders for this visit:    1. Pharyngitis, unspecified etiology (Primary)    2. Fever, unspecified fever cause    3. Nonintractable headache, unspecified chronicity pattern, unspecified headache type    4. Strep throat exposure  -     amoxicillin (AMOXIL) 875 MG tablet; Take 1 tablet by mouth 2 (Two) Times a Day.  Dispense: 20 tablet; Refill: 0    Treating empirically for strep throat.   Advised to check for Covid 19.             Treatment:  Gargle with a salt-water mixture 3-4 times a day or as needed. To make a salt-water mixture, completely dissolve ½-1 tsp (3-6 g) of salt in 1 cup (237 mL) of warm water.  Get plenty of rest.  Stay home from work or school until you have been taking antibiotics for 24 hours.  Avoid smoking or being around people who smoke.  Keep all follow-up visits as told by your health care provider. This is important.         Prevention:  Do not share food, drinking cups, or personal items that could cause the infection to spread to other people.  Wash your hands well with soap and water, and make sure that all people in your house wash their hands well.  Have family members tested if they have a sore throat or fever. They may need an antibiotic if they have strep throat.    Follow-up with PCP if:   The glands in your neck continue to get bigger.  You develop a rash, cough, or earache.  You cough up a thick mucus that is green, yellow-brown, or bloody.  You have pain or discomfort that does not get better with medicine.  Your symptoms seem to be getting worse and not better.  You have a fever.    Seek immediate care at Emergency Department:   You have new symptoms, such as vomiting, severe headache, stiff or painful neck, chest pain, or shortness of breath.  You have severe throat pain, drooling, or changes in your voice.  You have swelling of the neck, or the skin on the neck becomes red and tender.  You have signs of dehydration, such as tiredness (fatigue), dry  mouth, and decreased urination.  You become increasingly sleepy, or you cannot wake up completely.  Your joints become red or painful.         FOLLOW-UP  As discussed during visit with [facility], if symptoms worsen or fail to improve, follow-up with PCP/Urgent Care/Emergency Department.    Patient verbalizes understanding of medications, instructions for treatment and follow-up.    Dennise Alicea, APRN  08/13/2024  18:37 EDT    The use of a video visit has been reviewed with the patient and verbal informed consent has been obtained. Myself and Estuardo Crooks participated in this visit. The patient is located in [facility] and I am located in [place], [place]. [facility] and [employer]  were utilized.

## 2025-02-13 ENCOUNTER — E-VISIT (OUTPATIENT)
Dept: ADMINISTRATIVE | Facility: OTHER | Age: 30
End: 2025-02-13
Payer: COMMERCIAL

## 2025-02-13 NOTE — E-VISIT TREATED
Date: 2025 17:24:30  Clinician: undefined undefined  Clinician NPI: undefined  Patient: Estuardo Crooks  Patient : 1995  Patient Address: [address], [place] [id number]  Patient Phone: [phone]  Visit Protocol: URI  Patient Summary:  Estuardo is a 29 year old ( : 1995 ) male who initiated a visit for cold, sinus infection, or influenza.     Estuardo states the symptoms started gradually 10-13 days ago. After the symptoms started, they improved and then got   worse again.   Symptom start date: 2025   The symptoms consist of wheezing, ear pain, rhinitis, enlarged lymph nodes, malaise, myalgia, a sore throat, nasal congestion, diarrhea, and a cough.   Symptom details     Nasal secretions: The color of the mucus is green and yellow.    Cough: Estuardo coughs every 5-10 minutes and the cough is more bothersome at night. Phlegm comes into the throat when coughing. Estuardo believes the cough is caused by post-nasal drip. The color of the phlegm is yellow and green.     Sore throat: Estuardo reports having severe throat pain (7-9 on a 10 point pain scale), does not have exudate on the tonsils, and can swallow liquids but it is very painful. The lymph nodes in the neck are enlarged. The lymph node swelling is not worse   on one side and the swelling has caused changes in speech or hoarseness in the voice. A rash has not appeared on the skin since the sore throat started. Patient reports feeling pain in the front of the neck that sometimes moves to the ear.     Wheezing: Wheezing impacts daily activities sometimes.      Estuardo denies having headache, vomiting, facial pain or pressure, fever, anosmia and ageusia, teeth pain, nausea, and chills. Estuardo also denies having recent facial or sinus surgery in the past 60 days, experiencing difficulty opening their mouth   due to pain or swelling in the jaw muscles, and taking antibiotic medication in the past month. Estuardo is not  experiencing dyspnea.   Precipitating events  Within the past week, Estuardo has not been exposed to someone with strep throat. Estuardo has   recently been exposed to someone with influenza. Estuardo has been in close contact with the following high risk individuals: children under the age of 5.    Estuardo has not received the influenza vaccination.   Pertinent COVID-19 (Coronavirus)   information  Since the symptoms started, Estuardo has tested for COVID-19. The result of the test was negative. The negative result was within the last 48 hours.   Estuardo has not received a COVID-19 vaccine in the past year.   Pertinent medical history       Estuardo had 1 sinus infection within the past year.   A provider has not told Estuardo to avoid NSAIDs.   Estuardo does not have diabetes. Estuardo denies having immunosuppressive conditions (e.g., chemotherapy, HIV, organ transplant, long-term use of   steroids or other immunosuppressive medications, splenectomy). Estuardo does not have asthma.   Estuardo denies having chronic lung disease, cystic fibrosis, hypertension, long-term disabilities, mental health conditions, sickle cell disease or   thalassemia, stroke or other cardiovascular disease, substance use disorders, or tuberculosis (TB).  Estuardo does not smoke or use smokeless tobacco. Estuardo does not vape or use other e-cigarette products.   Additional information as reported by the   patient (free text): Body aches, sore throat, stuffy nose, headache, diarrhea, weakness   Weight: 236 lbs (107.05 kg)    MEDICATIONS: No current medications, ALLERGIES: NKDA  Clinician Response:  Dear Estuardo,  Based on the information provided, you have acute bacterial sinusitis, also known as a sinus infection. Most cases of sinus infections are caused by viruses and symptoms start to improve on their own in 7-10 days. Both   viral and bacterial sinus infections usually resolve on its own. A bacterial infection may have developed if any of the  following apply to you.      Symptoms of sinus infection lasting 10 days or more without showing any improvement    If you feel better after a viral upper respiratory infection such as, a cold but then suddenly feel worse with symptoms such as fever, headache, or increase in nasal discharge     Medication information  I am prescribing:       Fluticasone 50 mcg/actuation nasal spray. Spray twice in each nostril 1 time per day; after 1 week, may adjust to 1 - 2 sprays in each nostril 1 time per day. This medication takes several days to start working, so keep taking it even if it doesn't help   right away. There are no refills with this prescription.      Amoxicillin-pot clavulanate 875-125 mg oral tablet. Take 1 tablet by mouth every 12 hours for 7 days. There are no refills with this prescription.      Brompheniramine-pseudoeph-DM (Bromfed DM) 2-30-10 mg/5mL oral syrup. Take 10 milliliters by mouth every 4 hours as needed for cough. Do not take more than 60mL in 24 hours. There are no refills with this prescription.     Tips for using a nasal spray:     When the medication is being sprayed in your nose, point the tip of the nasal spray towards your ear.    Do not blow your nose after using the spray.    Do not lean your head back after using the spray as it will go down your throat.    Wipe the tip of the nose piece after use with a dry, clean tissue.     Self care  Steps you can take to be as comfortable as possible:     Rest.    Drink plenty of fluids.    Take a warm shower to loosen congestion.    Use a cool-mist humidifier.    Use throat lozenges.    Suck on frozen items such as popsicles.    Drink hot tea with lemon and honey.    Gargle with warm salt water (1/4 teaspoon of salt per 8 ounce glass of water).    Take a spoonful of honey to reduce your cough.     When to seek care  Please be seen in a clinic or urgent care if any of the following occur:     New symptoms develop, or symptoms become worse     Symptoms do not start to improve after 3 days of treatment     Call 911 or go to the emergency room if any of the following occur:     Difficulty breathing    If you feel that your throat is closing off    Suddenly develop a rash    Unable to swallow fluids or are drooling     It is possible to have an allergic reaction to an antibiotic even if you have not had one in the past. If you notice a new rash, significant swelling, or difficulty breathing, stop taking this medication immediately and go to a clinic or urgent care.    For the latest updates on COVID-19 (Coronavirus), please visit the Centers for Disease Control and Prevention (CDC). Also, your state and local health department websites may provide additional guidance regarding testing and isolation recommendations for   your location.   Diagnosis: Acute bacterial sinusitis  Diagnosis ICD: J01.90    Follow up instructions:  ATTENTION: If you have been prescribed medications, your prescriptions will not be sent until you choose your pharmacy. To do so open the link within your notification, or go to [employer] and click eVisit in the menu to open your   treatment plan. From there, you can select your pharmacy at the bottom of your after visit summary. You can also go to [email or web address]   Prescriptions  Prescription: brompheniramine-pseudoeph-DM (Bromfed DM) 2-30-10 mg/5 mL oral syrup, take 10 milliliters by mouth every 4 hours as needed for cough  Prescription: amoxicillin-pot clavulanate 875-125 mg oral tablet, take 1 tablet by mouth every 12 hours for 7 days  Prescription: fluticasone 50 mcg/actuation nasal spray,suspension, spray twice in each nostril 1 time per day; after 1 week, may adjust to 1 - 2 sprays in each nostril 1 time per day.

## 2025-02-14 RX ORDER — FLUTICASONE PROPIONATE 50 MCG
2 SPRAY, SUSPENSION (ML) NASAL DAILY
Qty: 16 G | Refills: 0 | Status: SHIPPED | OUTPATIENT
Start: 2025-02-14 | End: 2025-03-16

## 2025-02-14 RX ORDER — BROMPHENIRAMINE MALEATE, PSEUDOEPHEDRINE HYDROCHLORIDE, AND DEXTROMETHORPHAN HYDROBROMIDE 2; 30; 10 MG/5ML; MG/5ML; MG/5ML
5 SYRUP ORAL 4 TIMES DAILY PRN
Qty: 118 ML | Refills: 0 | Status: SHIPPED | OUTPATIENT
Start: 2025-02-14 | End: 2025-02-24

## (undated) DEVICE — ENDOPATH 5MM ENDOSCOPIC BLUNT TIP DISSECTORS (12 POUCHES CONTAINING 3 DISSECTORS EACH): Brand: ENDOPATH

## (undated) DEVICE — SUT MONOCRYL PLS ANTIB UND 3/0  PS1 27IN

## (undated) DEVICE — ENDOGATOR HYBRID TUBING KIT FOR USE WITH ENDOGATOR IRRIGATION PUMP, OLYMPUS PUMP, GI4000 ESU, AND TORRENT IRRIGATION PUMP.: Brand: ENDOGATOR KIT

## (undated) DEVICE — UNDRPD COMFRT GLD DRYPAD 36X57IN

## (undated) DEVICE — GLV SURG SENSICARE PI MIC PF SZ8.5 LF STRL

## (undated) DEVICE — ENSEAL LAPAROSCOPIC TISSUE SEALER G2 ARTICULATING CURVED JAW FOR USE WITH G2 GENERATOR 5MM DIAMETER 45CM SHAFT LENGTH: Brand: ENSEAL

## (undated) DEVICE — Device: Brand: DEFENDO AIR/WATER/SUCTION AND BIOPSY VALVE

## (undated) DEVICE — INTENDED USE FOR SURGICAL MARKING ON INTACT SKIN, ALSO PROVIDES A PERMANENT METHOD OF IDENTIFYING OBJECTS IN THE OPERATING ROOM: Brand: WRITESITE® REGULAR TIP SKIN MARKER

## (undated) DEVICE — NEEDLE, QUINCKE 22GX3.5": Brand: MEDLINE INDUSTRIES, INC.

## (undated) DEVICE — Device

## (undated) DEVICE — SYR LUERLOK 30CC

## (undated) DEVICE — HYPODERMIC SAFETY NEEDLE: Brand: MONOJECT

## (undated) DEVICE — [HIGH FLOW INSUFFLATOR,  DO NOT USE IF PACKAGE IS DAMAGED,  KEEP DRY,  KEEP AWAY FROM SUNLIGHT,  PROTECT FROM HEAT AND RADIOACTIVE SOURCES.]: Brand: PNEUMOSURE

## (undated) DEVICE — ENDOPATH XCEL UNIVERSAL TROCAR STABLILITY SLEEVES: Brand: ENDOPATH XCEL

## (undated) DEVICE — APL DUPLOSPRAYER MIS 40CM

## (undated) DEVICE — ADHS SKIN PREMIERPRO EXOFIN TOPICAL HI/VISC .5ML

## (undated) DEVICE — GOWN,NON-REINFORCED,SIRUS,SET IN SLV,XXL: Brand: MEDLINE

## (undated) DEVICE — LAPAROSCOPIC SMOKE FILTRATION SYSTEM: Brand: PALL LAPAROSHIELD® PLUS LAPAROSCOPIC SMOKE FILTRATION SYSTEM

## (undated) DEVICE — SHT AIR TRANSFR COMFRT GLIDE LAT 40X80IN

## (undated) DEVICE — LAPAROVUE VISIBILITY SYSTEM LAPAROSCOPIC SOLUTIONS: Brand: LAPAROVUE

## (undated) DEVICE — BALN BOUGIE STD/TPR 38F

## (undated) DEVICE — SEALANT WND FIBRIN TISSEEL PREFIL/SYR/PRIMAFZ 10ML

## (undated) DEVICE — PATIENT RETURN ELECTRODE, SINGLE-USE, CONTACT QUALITY MONITORING, ADULT, WITH 9FT CORD, FOR PATIENTS WEIGING OVER 33LBS. (15KG): Brand: MEGADYNE

## (undated) DEVICE — TROCAR: Brand: KII FIOS FIRST ENTRY

## (undated) DEVICE — PK BARIATRIC 10

## (undated) DEVICE — CONTN FORMALN PREFIL 20ML CA/256

## (undated) DEVICE — TROC STANDARDTROCAR FOR/TITANSGS 19MM DISP STRL

## (undated) DEVICE — GLV SURG SENSICARE PI MIC PF SZ9 LF STRL

## (undated) DEVICE — APPL CHLORAPREP TINTED 26ML TEAL

## (undated) DEVICE — CONTN GRAD MEAS TRIANG 32OZ BLK

## (undated) DEVICE — BLANKT WARM UPPR/BDY ARM/OUT 57X196CM